# Patient Record
Sex: MALE | Race: ASIAN | NOT HISPANIC OR LATINO | URBAN - METROPOLITAN AREA
[De-identification: names, ages, dates, MRNs, and addresses within clinical notes are randomized per-mention and may not be internally consistent; named-entity substitution may affect disease eponyms.]

---

## 2017-01-31 ENCOUNTER — COMMUNICATION - HEALTHEAST (OUTPATIENT)
Dept: NURSING | Facility: CLINIC | Age: 47
End: 2017-01-31

## 2017-03-15 ENCOUNTER — COMMUNICATION - HEALTHEAST (OUTPATIENT)
Dept: NURSING | Facility: CLINIC | Age: 47
End: 2017-03-15

## 2017-06-26 ENCOUNTER — COMMUNICATION - HEALTHEAST (OUTPATIENT)
Dept: NURSING | Facility: CLINIC | Age: 47
End: 2017-06-26

## 2021-05-29 ENCOUNTER — RECORDS - HEALTHEAST (OUTPATIENT)
Dept: ADMINISTRATIVE | Facility: CLINIC | Age: 51
End: 2021-05-29

## 2021-05-31 ENCOUNTER — RECORDS - HEALTHEAST (OUTPATIENT)
Dept: ADMINISTRATIVE | Facility: CLINIC | Age: 51
End: 2021-05-31

## 2021-06-08 NOTE — PROGRESS NOTES
I have called the patient several times and have been unsuccessful in reaching him for 2 months now.  At this time, the patient has not returned any of my messages.  I will continue attempting to reach out to the patient in one month.  I will also check the patient's chart for upcoming appointments, ER reports that may contain a new phone number, or any other recent activity.  I have informed the primary care provider by message regarding the lack of successful follow up.    Pending Appointments:  None  _______________________  Planned Outreach Frequency: every 2 weeks  Preferred Phone Number: [phone]    Preferred : [facility]    Chronic Medical Diagnosis:  None    Mental Health:  Diagnosis:   Adjustment Disorder with Depressed Mood    Mental Health Provider:   Psychotherapist: JONNY Waite at [facility]  Psychiatry: Shannan Al CNP at [facility] (initial appointment 11/18/16 at 9:00)    Transportation:  Drivers license  Owns a car    Housing:  Living with his brotherKeon    Financial:  $12.00/hour, works 24.50 hours per pay period = $294/pay period  $588.00/month gross    Legal Immigration:  [place] Citizen     Substance/CD:  Smoking: Never Smoker   Smokeless Tobacco: Never Used   Alcohol: No     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  Future goal around obtaining a full time job    Interpersonal:  Legally , currently  and divorce process was begun 9/2/16  8 children (currently at the house with their mom)    Social Support:  BrotherKeon  Father, Weston Davey    Household Members:  Self  Keon Davey (brother)    Rest/Sleep:  Goes to bed about 11:00 PM  Wakes about 5:00 AM  Taking Remeron at bedtime to help with sleep, wakes up dizzy    Nutrition:  Decreased appetite due to divorce/stress  Drinking water    Exercise:  Unknown    Hygiene:  Independent    Medications:  Medication Management:  independent    Preventative Measures:  Medical Insurance: Medicaid  Annual Physical Exam: DUE  Flu Shot: 10/6/16  Advanced Care Directive: Due (discussed the importance of this, especially due to his current circumstances)    Advent/Spiritual:  Unknown    Safety:  No concerns    Family Planning:  Not applicable    Knowledge of Resources:  Patient is unsure of what is available to him    Barriers:  Language: Non-English speaking  Employment: minimal work at this time  Familial: wife filed an Order for Protection, Served Divorce papers 9/2/16

## 2021-06-09 NOTE — PROGRESS NOTES
I have called the patient several times over the past three months and have been unsuccessful in reaching him.  The patient has not returned any of my messages.  I am mailing the patient a letter stating the lack of success in reaching him/her.  I will continue attempting to reach out to the patient in 3 months.  I will also check the patient's chart for upcoming appointments, ER reports that may contain a new phone number, or any other recent activity.  I have informed the primary care provider by message regarding the lack of successful follow up.    Pending Appointments:  None  _______________________  Planned Outreach Frequency: every 2 weeks  Preferred Phone Number: [phone]    [facility] : [facility]    Chronic Medical Diagnosis:  None    Mental Health:  Diagnosis:   Adjustment Disorder with Depressed Mood    Mental Health Provider:   Psychotherapist: JONNY Waite at [facility]  Psychiatry: Shannan Al CNP at [facility] (initial appointment 11/18/16 at 9:00)    Transportation:  Drivers license  Owns a car    Housing:  Living with his brotherKeon    Financial:  $12.00/hour, works 24.50 hours per pay period = $294/pay period  $588.00/month gross    Legal Immigration:  [place] Citizen     Substance/CD:  Smoking: Never Smoker   Smokeless Tobacco: Never Used   Alcohol: No     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  Future goal around obtaining a full time job    Interpersonal:  Legally , currently  and divorce process was begun 9/2/16  8 children (currently at the house with their mom)    Social Support:  BrotherKeon  Father, Weston Davey    Household Members:  Self  Keon Davey (brother)    Rest/Sleep:  Goes to bed about 11:00 PM  Wakes about 5:00 AM  Taking Remeron at bedtime to help with sleep, wakes up dizzy    Nutrition:  Decreased appetite due to divorce/stress  Drinking  water    Exercise:  Unknown    Hygiene:  Independent    Medications:  Medication Management: independent    Preventative Measures:  Medical Insurance: Medicaid  Annual Physical Exam: DUE  Flu Shot: 10/6/16  Advanced Care Directive: Due (discussed the importance of this, especially due to his current circumstances)    Holiness/Spiritual:  Unknown    Safety:  No concerns    Family Planning:  Not applicable    Knowledge of Resources:  Patient is unsure of what is available to him    Barriers:  Language: Non-English speaking  Employment: minimal work at this time  Familial: wife filed an Order for Protection, Served Divorce papers 9/2/16

## 2021-06-11 NOTE — PROGRESS NOTES
Care Guide attempted to contact the patient.  If the patient is returning my call, please transfer the patient to me, Laura Vasquez, at [phone].    I have called the patient and have been unsuccessful in reaching him since I last spoke to him on 10/6/16.  The patient has not returned any of my messages.  I have now sent the patient a letter and un-enrolled him from [facility].  The patient can be referred again if there is a need for care coordination in the future.     Pending Appointments:  None  _______________________  Planned Outreach Frequency: every 2 weeks  Preferred Phone Number: [phone]    Preferred : [facility]    Chronic Medical Diagnosis:  None    Mental Health:  Diagnosis:   Adjustment Disorder with Depressed Mood    Mental Health Provider:   Psychotherapist: JONNY Waite at [facility]  Psychiatry: Shannan Al CNP at [facility] (initial appointment 11/18/16 at 9:00)    Transportation:  Drivers license  Owns a car    Housing:  Living with his brotherKeon    Financial:  $12.00/hour, works 24.50 hours per pay period = $294/pay period  $588.00/month gross    Legal Immigration:  [place] Citizen     Substance/CD:  Smoking: Never Smoker   Smokeless Tobacco: Never Used   Alcohol: No     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  Future goal around obtaining a full time job    Interpersonal:  Legally , currently  and divorce process was begun 9/2/16  8 children (currently at the house with their mom)    Social Support:  BrotherKeon  Father, Weston Davey    Household Members:  Self  Keon Davey (brother)    Rest/Sleep:  Goes to bed about 11:00 PM  Wakes about 5:00 AM  Taking Remeron at bedtime to help with sleep, wakes up dizzy    Nutrition:  Decreased appetite due to divorce/stress  Drinking water    Exercise:  Unknown    Hygiene:  Independent    Medications:  Medication  Management: independent    Preventative Measures:  Medical Insurance: Medicaid  Annual Physical Exam: DUE  Flu Shot: 10/6/16  Advanced Care Directive: Due (discussed the importance of this, especially due to his current circumstances)    Church/Spiritual:  Unknown    Safety:  No concerns    Family Planning:  Not applicable    Knowledge of Resources:  Patient is unsure of what is available to him    Barriers:  Language: Non-English speaking  Employment: minimal work at this time  Familial: wife filed an Order for Protection, Served Divorce papers 9/2/16

## 2025-03-10 ENCOUNTER — APPOINTMENT (OUTPATIENT)
Dept: CT IMAGING | Facility: HOSPITAL | Age: 55
DRG: 417 | End: 2025-03-10
Attending: STUDENT IN AN ORGANIZED HEALTH CARE EDUCATION/TRAINING PROGRAM

## 2025-03-10 ENCOUNTER — VIRTUAL VISIT (OUTPATIENT)
Dept: INTERPRETER SERVICES | Facility: CLINIC | Age: 55
End: 2025-03-10

## 2025-03-10 ENCOUNTER — APPOINTMENT (OUTPATIENT)
Dept: ULTRASOUND IMAGING | Facility: HOSPITAL | Age: 55
DRG: 417 | End: 2025-03-10
Attending: STUDENT IN AN ORGANIZED HEALTH CARE EDUCATION/TRAINING PROGRAM

## 2025-03-10 ENCOUNTER — APPOINTMENT (OUTPATIENT)
Dept: MRI IMAGING | Facility: HOSPITAL | Age: 55
DRG: 417 | End: 2025-03-10
Attending: STUDENT IN AN ORGANIZED HEALTH CARE EDUCATION/TRAINING PROGRAM

## 2025-03-10 ENCOUNTER — HOSPITAL ENCOUNTER (INPATIENT)
Facility: HOSPITAL | Age: 55
DRG: 417 | End: 2025-03-10
Attending: STUDENT IN AN ORGANIZED HEALTH CARE EDUCATION/TRAINING PROGRAM | Admitting: STUDENT IN AN ORGANIZED HEALTH CARE EDUCATION/TRAINING PROGRAM

## 2025-03-10 DIAGNOSIS — K81.0 ACUTE CHOLECYSTITIS: ICD-10-CM

## 2025-03-10 DIAGNOSIS — R79.89 ELEVATED LFTS: ICD-10-CM

## 2025-03-10 DIAGNOSIS — R07.9 CHEST PAIN, UNSPECIFIED TYPE: ICD-10-CM

## 2025-03-10 DIAGNOSIS — I10 BENIGN ESSENTIAL HYPERTENSION: Primary | ICD-10-CM

## 2025-03-10 DIAGNOSIS — N17.9 AKI (ACUTE KIDNEY INJURY): ICD-10-CM

## 2025-03-10 DIAGNOSIS — R10.84 GENERALIZED ABDOMINAL PAIN: ICD-10-CM

## 2025-03-10 LAB
ALBUMIN SERPL BCG-MCNC: 4 G/DL (ref 3.5–5.2)
ALP SERPL-CCNC: 250 U/L (ref 40–150)
ALT SERPL W P-5'-P-CCNC: 445 U/L (ref 0–70)
ANION GAP SERPL CALCULATED.3IONS-SCNC: 10 MMOL/L (ref 7–15)
AST SERPL W P-5'-P-CCNC: 200 U/L (ref 0–45)
BASOPHILS # BLD AUTO: 0 10E3/UL (ref 0–0.2)
BASOPHILS NFR BLD AUTO: 0 %
BILIRUB SERPL-MCNC: 4.1 MG/DL
BUN SERPL-MCNC: 13.4 MG/DL (ref 6–20)
CALCIUM SERPL-MCNC: 9.3 MG/DL (ref 8.8–10.4)
CHLORIDE SERPL-SCNC: 106 MMOL/L (ref 98–107)
CREAT SERPL-MCNC: 1.08 MG/DL (ref 0.67–1.17)
EGFRCR SERPLBLD CKD-EPI 2021: 81 ML/MIN/1.73M2
EOSINOPHIL # BLD AUTO: 0 10E3/UL (ref 0–0.7)
EOSINOPHIL NFR BLD AUTO: 0 %
ERYTHROCYTE [DISTWIDTH] IN BLOOD BY AUTOMATED COUNT: 13.3 % (ref 10–15)
EST. AVERAGE GLUCOSE BLD GHB EST-MCNC: 100 MG/DL
GLUCOSE BLDC GLUCOMTR-MCNC: 138 MG/DL (ref 70–99)
GLUCOSE SERPL-MCNC: 107 MG/DL (ref 70–99)
HBA1C MFR BLD: 5.1 %
HCO3 SERPL-SCNC: 23 MMOL/L (ref 22–29)
HCT VFR BLD AUTO: 50 % (ref 40–53)
HGB BLD-MCNC: 15.7 G/DL (ref 13.3–17.7)
IMM GRANULOCYTES # BLD: 0.1 10E3/UL
IMM GRANULOCYTES NFR BLD: 0 %
LIPASE SERPL-CCNC: 1660 U/L (ref 13–60)
LYMPHOCYTES # BLD AUTO: 1 10E3/UL (ref 0.8–5.3)
LYMPHOCYTES NFR BLD AUTO: 8 %
MAGNESIUM SERPL-MCNC: 2.2 MG/DL (ref 1.7–2.3)
MCH RBC QN AUTO: 27.9 PG (ref 26.5–33)
MCHC RBC AUTO-ENTMCNC: 31.4 G/DL (ref 31.5–36.5)
MCV RBC AUTO: 89 FL (ref 78–100)
MONOCYTES # BLD AUTO: 0.6 10E3/UL (ref 0–1.3)
MONOCYTES NFR BLD AUTO: 4 %
NEUTROPHILS # BLD AUTO: 11.3 10E3/UL (ref 1.6–8.3)
NEUTROPHILS NFR BLD AUTO: 88 %
NRBC # BLD AUTO: 0 10E3/UL
NRBC BLD AUTO-RTO: 0 /100
PHOSPHATE SERPL-MCNC: 2.5 MG/DL (ref 2.5–4.5)
PLATELET # BLD AUTO: 219 10E3/UL (ref 150–450)
POTASSIUM SERPL-SCNC: 4 MMOL/L (ref 3.4–5.3)
PROT SERPL-MCNC: 7 G/DL (ref 6.4–8.3)
RBC # BLD AUTO: 5.62 10E6/UL (ref 4.4–5.9)
SODIUM SERPL-SCNC: 139 MMOL/L (ref 135–145)
TROPONIN T SERPL HS-MCNC: 14 NG/L
TROPONIN T SERPL HS-MCNC: 16 NG/L
WBC # BLD AUTO: 12.9 10E3/UL (ref 4–11)

## 2025-03-10 PROCEDURE — 83690 ASSAY OF LIPASE: CPT | Performed by: STUDENT IN AN ORGANIZED HEALTH CARE EDUCATION/TRAINING PROGRAM

## 2025-03-10 PROCEDURE — 255N000002 HC RX 255 OP 636: Performed by: STUDENT IN AN ORGANIZED HEALTH CARE EDUCATION/TRAINING PROGRAM

## 2025-03-10 PROCEDURE — 74183 MRI ABD W/O CNTR FLWD CNTR: CPT

## 2025-03-10 PROCEDURE — 99223 1ST HOSP IP/OBS HIGH 75: CPT | Performed by: STUDENT IN AN ORGANIZED HEALTH CARE EDUCATION/TRAINING PROGRAM

## 2025-03-10 PROCEDURE — 36415 COLL VENOUS BLD VENIPUNCTURE: CPT | Performed by: STUDENT IN AN ORGANIZED HEALTH CARE EDUCATION/TRAINING PROGRAM

## 2025-03-10 PROCEDURE — 83735 ASSAY OF MAGNESIUM: CPT | Performed by: STUDENT IN AN ORGANIZED HEALTH CARE EDUCATION/TRAINING PROGRAM

## 2025-03-10 PROCEDURE — 250N000011 HC RX IP 250 OP 636: Performed by: STUDENT IN AN ORGANIZED HEALTH CARE EDUCATION/TRAINING PROGRAM

## 2025-03-10 PROCEDURE — 93005 ELECTROCARDIOGRAM TRACING: CPT | Performed by: STUDENT IN AN ORGANIZED HEALTH CARE EDUCATION/TRAINING PROGRAM

## 2025-03-10 PROCEDURE — 82947 ASSAY GLUCOSE BLOOD QUANT: CPT | Performed by: STUDENT IN AN ORGANIZED HEALTH CARE EDUCATION/TRAINING PROGRAM

## 2025-03-10 PROCEDURE — 82310 ASSAY OF CALCIUM: CPT | Performed by: STUDENT IN AN ORGANIZED HEALTH CARE EDUCATION/TRAINING PROGRAM

## 2025-03-10 PROCEDURE — 71275 CT ANGIOGRAPHY CHEST: CPT

## 2025-03-10 PROCEDURE — 84484 ASSAY OF TROPONIN QUANT: CPT | Performed by: STUDENT IN AN ORGANIZED HEALTH CARE EDUCATION/TRAINING PROGRAM

## 2025-03-10 PROCEDURE — 120N000001 HC R&B MED SURG/OB

## 2025-03-10 PROCEDURE — 85004 AUTOMATED DIFF WBC COUNT: CPT | Performed by: STUDENT IN AN ORGANIZED HEALTH CARE EDUCATION/TRAINING PROGRAM

## 2025-03-10 PROCEDURE — 250N000013 HC RX MED GY IP 250 OP 250 PS 637: Performed by: STUDENT IN AN ORGANIZED HEALTH CARE EDUCATION/TRAINING PROGRAM

## 2025-03-10 PROCEDURE — 99285 EMERGENCY DEPT VISIT HI MDM: CPT | Mod: 25

## 2025-03-10 PROCEDURE — 84100 ASSAY OF PHOSPHORUS: CPT | Performed by: STUDENT IN AN ORGANIZED HEALTH CARE EDUCATION/TRAINING PROGRAM

## 2025-03-10 PROCEDURE — 76705 ECHO EXAM OF ABDOMEN: CPT

## 2025-03-10 PROCEDURE — A9585 GADOBUTROL INJECTION: HCPCS | Performed by: STUDENT IN AN ORGANIZED HEALTH CARE EDUCATION/TRAINING PROGRAM

## 2025-03-10 PROCEDURE — 82962 GLUCOSE BLOOD TEST: CPT

## 2025-03-10 PROCEDURE — 84075 ASSAY ALKALINE PHOSPHATASE: CPT | Performed by: STUDENT IN AN ORGANIZED HEALTH CARE EDUCATION/TRAINING PROGRAM

## 2025-03-10 PROCEDURE — 85048 AUTOMATED LEUKOCYTE COUNT: CPT | Performed by: STUDENT IN AN ORGANIZED HEALTH CARE EDUCATION/TRAINING PROGRAM

## 2025-03-10 PROCEDURE — 96374 THER/PROPH/DIAG INJ IV PUSH: CPT | Mod: 59

## 2025-03-10 PROCEDURE — 83036 HEMOGLOBIN GLYCOSYLATED A1C: CPT | Performed by: STUDENT IN AN ORGANIZED HEALTH CARE EDUCATION/TRAINING PROGRAM

## 2025-03-10 PROCEDURE — 250N000011 HC RX IP 250 OP 636: Mod: JZ | Performed by: STUDENT IN AN ORGANIZED HEALTH CARE EDUCATION/TRAINING PROGRAM

## 2025-03-10 PROCEDURE — 96375 TX/PRO/DX INJ NEW DRUG ADDON: CPT

## 2025-03-10 PROCEDURE — 258N000003 HC RX IP 258 OP 636: Performed by: STUDENT IN AN ORGANIZED HEALTH CARE EDUCATION/TRAINING PROGRAM

## 2025-03-10 RX ORDER — PIPERACILLIN SODIUM, TAZOBACTAM SODIUM 3; .375 G/15ML; G/15ML
3.38 INJECTION, POWDER, LYOPHILIZED, FOR SOLUTION INTRAVENOUS EVERY 8 HOURS
Status: DISCONTINUED | OUTPATIENT
Start: 2025-03-11 | End: 2025-03-17

## 2025-03-10 RX ORDER — CALCIUM CARBONATE 500 MG/1
1000 TABLET, CHEWABLE ORAL 4 TIMES DAILY PRN
Status: DISCONTINUED | OUTPATIENT
Start: 2025-03-10 | End: 2025-03-17 | Stop reason: HOSPADM

## 2025-03-10 RX ORDER — HYDROMORPHONE HYDROCHLORIDE 1 MG/ML
0.5 INJECTION, SOLUTION INTRAMUSCULAR; INTRAVENOUS; SUBCUTANEOUS ONCE
Status: COMPLETED | OUTPATIENT
Start: 2025-03-10 | End: 2025-03-10

## 2025-03-10 RX ORDER — PIPERACILLIN SODIUM, TAZOBACTAM SODIUM 3; .375 G/15ML; G/15ML
3.38 INJECTION, POWDER, LYOPHILIZED, FOR SOLUTION INTRAVENOUS ONCE
Status: COMPLETED | OUTPATIENT
Start: 2025-03-10 | End: 2025-03-10

## 2025-03-10 RX ORDER — AMOXICILLIN 250 MG
1 CAPSULE ORAL 2 TIMES DAILY PRN
Status: DISCONTINUED | OUTPATIENT
Start: 2025-03-10 | End: 2025-03-17 | Stop reason: HOSPADM

## 2025-03-10 RX ORDER — IOPAMIDOL 755 MG/ML
90 INJECTION, SOLUTION INTRAVASCULAR ONCE
Status: COMPLETED | OUTPATIENT
Start: 2025-03-10 | End: 2025-03-10

## 2025-03-10 RX ORDER — GADOBUTROL 604.72 MG/ML
7 INJECTION INTRAVENOUS ONCE
Status: COMPLETED | OUTPATIENT
Start: 2025-03-10 | End: 2025-03-10

## 2025-03-10 RX ORDER — HYDRALAZINE HYDROCHLORIDE 20 MG/ML
10 INJECTION INTRAMUSCULAR; INTRAVENOUS EVERY 4 HOURS PRN
Status: DISCONTINUED | OUTPATIENT
Start: 2025-03-10 | End: 2025-03-11

## 2025-03-10 RX ORDER — MAGNESIUM HYDROXIDE/ALUMINUM HYDROXICE/SIMETHICONE 120; 1200; 1200 MG/30ML; MG/30ML; MG/30ML
30 SUSPENSION ORAL EVERY 4 HOURS PRN
Status: DISCONTINUED | OUTPATIENT
Start: 2025-03-10 | End: 2025-03-17 | Stop reason: HOSPADM

## 2025-03-10 RX ORDER — OXYCODONE HYDROCHLORIDE 5 MG/1
5 TABLET ORAL ONCE
Status: COMPLETED | OUTPATIENT
Start: 2025-03-10 | End: 2025-03-10

## 2025-03-10 RX ORDER — HYDRALAZINE HYDROCHLORIDE 10 MG/1
10 TABLET, FILM COATED ORAL EVERY 4 HOURS PRN
Status: DISCONTINUED | OUTPATIENT
Start: 2025-03-10 | End: 2025-03-11

## 2025-03-10 RX ORDER — OXYCODONE HYDROCHLORIDE 5 MG/1
5 TABLET ORAL EVERY 4 HOURS PRN
Status: DISCONTINUED | OUTPATIENT
Start: 2025-03-10 | End: 2025-03-17 | Stop reason: HOSPADM

## 2025-03-10 RX ORDER — HYDROMORPHONE HCL IN WATER/PF 6 MG/30 ML
0.2 PATIENT CONTROLLED ANALGESIA SYRINGE INTRAVENOUS
Status: DISCONTINUED | OUTPATIENT
Start: 2025-03-10 | End: 2025-03-11

## 2025-03-10 RX ORDER — SODIUM CHLORIDE 9 MG/ML
INJECTION, SOLUTION INTRAVENOUS CONTINUOUS
Status: DISCONTINUED | OUTPATIENT
Start: 2025-03-10 | End: 2025-03-11

## 2025-03-10 RX ORDER — ONDANSETRON 2 MG/ML
4 INJECTION INTRAMUSCULAR; INTRAVENOUS EVERY 6 HOURS PRN
Status: DISCONTINUED | OUTPATIENT
Start: 2025-03-10 | End: 2025-03-17 | Stop reason: HOSPADM

## 2025-03-10 RX ORDER — AMOXICILLIN 250 MG
2 CAPSULE ORAL 2 TIMES DAILY PRN
Status: DISCONTINUED | OUTPATIENT
Start: 2025-03-10 | End: 2025-03-17 | Stop reason: HOSPADM

## 2025-03-10 RX ORDER — LIDOCAINE 40 MG/G
CREAM TOPICAL
Status: DISCONTINUED | OUTPATIENT
Start: 2025-03-10 | End: 2025-03-17 | Stop reason: HOSPADM

## 2025-03-10 RX ORDER — HYDROMORPHONE HCL IN WATER/PF 6 MG/30 ML
0.4 PATIENT CONTROLLED ANALGESIA SYRINGE INTRAVENOUS
Status: DISCONTINUED | OUTPATIENT
Start: 2025-03-10 | End: 2025-03-11

## 2025-03-10 RX ORDER — ONDANSETRON 4 MG/1
4 TABLET, ORALLY DISINTEGRATING ORAL EVERY 6 HOURS PRN
Status: DISCONTINUED | OUTPATIENT
Start: 2025-03-10 | End: 2025-03-17 | Stop reason: HOSPADM

## 2025-03-10 RX ADMIN — HYDROMORPHONE HYDROCHLORIDE 0.5 MG: 1 INJECTION, SOLUTION INTRAMUSCULAR; INTRAVENOUS; SUBCUTANEOUS at 16:37

## 2025-03-10 RX ADMIN — GADOBUTROL 7 ML: 604.72 INJECTION INTRAVENOUS at 20:13

## 2025-03-10 RX ADMIN — HYDROMORPHONE HYDROCHLORIDE 0.4 MG: 0.2 INJECTION, SOLUTION INTRAMUSCULAR; INTRAVENOUS; SUBCUTANEOUS at 23:52

## 2025-03-10 RX ADMIN — IOPAMIDOL 90 ML: 755 INJECTION, SOLUTION INTRAVENOUS at 17:14

## 2025-03-10 RX ADMIN — HYDRALAZINE HYDROCHLORIDE 10 MG: 20 INJECTION INTRAMUSCULAR; INTRAVENOUS at 21:16

## 2025-03-10 RX ADMIN — HYDROMORPHONE HYDROCHLORIDE 0.5 MG: 1 INJECTION, SOLUTION INTRAMUSCULAR; INTRAVENOUS; SUBCUTANEOUS at 18:33

## 2025-03-10 RX ADMIN — OXYCODONE HYDROCHLORIDE 5 MG: 5 TABLET ORAL at 16:10

## 2025-03-10 RX ADMIN — SODIUM CHLORIDE: 0.9 INJECTION, SOLUTION INTRAVENOUS at 21:21

## 2025-03-10 RX ADMIN — HYDROMORPHONE HYDROCHLORIDE 0.4 MG: 0.2 INJECTION, SOLUTION INTRAMUSCULAR; INTRAVENOUS; SUBCUTANEOUS at 21:17

## 2025-03-10 RX ADMIN — PIPERACILLIN AND TAZOBACTAM 3.38 G: 3; .375 INJECTION, POWDER, FOR SOLUTION INTRAVENOUS at 18:35

## 2025-03-10 RX ADMIN — FAMOTIDINE 20 MG: 10 INJECTION, SOLUTION INTRAVENOUS at 16:10

## 2025-03-10 RX ADMIN — ONDANSETRON 4 MG: 2 INJECTION, SOLUTION INTRAMUSCULAR; INTRAVENOUS at 23:52

## 2025-03-10 ASSESSMENT — ACTIVITIES OF DAILY LIVING (ADL)
ADLS_ACUITY_SCORE: 41
ADLS_ACUITY_SCORE: 48
ADLS_ACUITY_SCORE: 41

## 2025-03-10 ASSESSMENT — COLUMBIA-SUICIDE SEVERITY RATING SCALE - C-SSRS
2. HAVE YOU ACTUALLY HAD ANY THOUGHTS OF KILLING YOURSELF IN THE PAST MONTH?: NO
6. HAVE YOU EVER DONE ANYTHING, STARTED TO DO ANYTHING, OR PREPARED TO DO ANYTHING TO END YOUR LIFE?: NO
1. IN THE PAST MONTH, HAVE YOU WISHED YOU WERE DEAD OR WISHED YOU COULD GO TO SLEEP AND NOT WAKE UP?: NO

## 2025-03-10 NOTE — ED PROVIDER NOTES
EMERGENCY DEPARTMENT SIGN OUT NOTE        ED COURSE AND MEDICAL DECISION MAKING  Patient was signed out to me by Dr Yessica Ni at 6:15 PM  6:30 PM Spoke with Dr. Gondal, hospitalist, who accepts patient for admission.  GI recommended right upper quadrant ultrasound, admission and will plan for multi team approach with surgery.  7:29 PM Spoke with Dr. Estes, surgery. NPO at midnight. Continue antibiotics. Plan for muti-team approach with GI tomorrow.     In brief, Blue Davey is a 55 year old male who initially presented with abdominal pain radiating to his left chest.      At time of sign out, pending discussion with hospitalist and surgery.     FINAL IMPRESSION    1. Generalized abdominal pain    2. Chest pain, unspecified type    3. Acute cholecystitis    4. Elevated LFTs        ED MEDS  Medications   lidocaine 1 % 0.1-1 mL (has no administration in time range)   lidocaine (LMX4) cream (has no administration in time range)   sodium chloride (PF) 0.9% PF flush 3 mL (3 mLs Intracatheter $Given 3/10/25 6452)   sodium chloride (PF) 0.9% PF flush 3 mL (has no administration in time range)   senna-docusate (SENOKOT-S/PERICOLACE) 8.6-50 MG per tablet 1 tablet (has no administration in time range)     Or   senna-docusate (SENOKOT-S/PERICOLACE) 8.6-50 MG per tablet 2 tablet (has no administration in time range)   calcium carbonate (TUMS) chewable tablet 1,000 mg (has no administration in time range)   sodium chloride 0.9 % infusion (has no administration in time range)   hydrALAZINE (APRESOLINE) tablet 10 mg (has no administration in time range)     Or   hydrALAZINE (APRESOLINE) injection 10 mg (has no administration in time range)   oxyCODONE IR (ROXICODONE) half-tab 2.5 mg (has no administration in time range)   oxyCODONE (ROXICODONE) tablet 5 mg (has no administration in time range)   HYDROmorphone (DILAUDID) injection 0.2 mg (has no administration in time range)   HYDROmorphone (DILAUDID) injection 0.4 mg (has no  administration in time range)   ondansetron (ZOFRAN ODT) ODT tab 4 mg (has no administration in time range)     Or   ondansetron (ZOFRAN) injection 4 mg (has no administration in time range)   famotidine (PEPCID) injection 20 mg (has no administration in time range)   pantoprazole (PROTONIX) IV push injection 40 mg (has no administration in time range)   alum & mag hydroxide-simethicone (MAALOX) suspension 30 mL (has no administration in time range)   piperacillin-tazobactam (ZOSYN) 3.375 g vial to attach to  mL bag (has no administration in time range)   oxyCODONE (ROXICODONE) tablet 5 mg (5 mg Oral $Given 3/10/25 1610)   famotidine (PEPCID) injection 20 mg (20 mg Intravenous $Given 3/10/25 1610)   HYDROmorphone (PF) (DILAUDID) injection 0.5 mg (0.5 mg Intravenous $Given 3/10/25 1637)   iopamidol (ISOVUE-370) solution 90 mL (90 mLs Intravenous $Given 3/10/25 1714)   piperacillin-tazobactam (ZOSYN) 3.375 g vial to attach to  mL bag (3.375 g Intravenous $New Bag 3/10/25 1835)   HYDROmorphone (PF) (DILAUDID) injection 0.5 mg (0.5 mg Intravenous $Given 3/10/25 1833)       LAB  Labs Ordered and Resulted from Time of ED Arrival to Time of ED Departure   COMPREHENSIVE METABOLIC PANEL - Abnormal       Result Value    Sodium 139      Potassium 4.0      Carbon Dioxide (CO2) 23      Anion Gap 10      Urea Nitrogen 13.4      Creatinine 1.08      GFR Estimate 81      Calcium 9.3      Chloride 106      Glucose 107 (*)     Alkaline Phosphatase 250 (*)      (*)      (*)     Protein Total 7.0      Albumin 4.0      Bilirubin Total 4.1 (*)    LIPASE - Abnormal    Lipase 1,660 (*)    CBC WITH PLATELETS AND DIFFERENTIAL - Abnormal    WBC Count 12.9 (*)     RBC Count 5.62      Hemoglobin 15.7      Hematocrit 50.0      MCV 89      MCH 27.9      MCHC 31.4 (*)     RDW 13.3      Platelet Count 219      % Neutrophils 88      % Lymphocytes 8      % Monocytes 4      % Eosinophils 0      % Basophils 0      % Immature  Granulocytes 0      NRBCs per 100 WBC 0      Absolute Neutrophils 11.3 (*)     Absolute Lymphocytes 1.0      Absolute Monocytes 0.6      Absolute Eosinophils 0.0      Absolute Basophils 0.0      Absolute Immature Granulocytes 0.1      Absolute NRBCs 0.0     TROPONIN T, HIGH SENSITIVITY - Normal    Troponin T, High Sensitivity 16     TROPONIN T, HIGH SENSITIVITY - Normal    Troponin T, High Sensitivity 14     MAGNESIUM - Normal    Magnesium 2.2     PHOSPHORUS - Normal    Phosphorus 2.5     HEMOGLOBIN A1C - Normal    Estimated Average Glucose 100      Hemoglobin A1C 5.1     GLUCOSE MONITOR NURSING POCT         RADIOLOGY    CTA Chest Abdomen Pelvis w Contrast   Final Result   IMPRESSION:   1.  Findings consistent with acute pancreatitis with no necrotizing features or pseudocyst formation.      2.  Moderate to advanced fatty infiltration of the liver.      3.  Mild pericholecystic fluid with gallbladder wall thickening measuring up to 4.6 mm. These findings can be seen with acute cholecystitis and ultrasound may be of benefit for further evaluation.      4.  Calcifications seen in the pancreatic head with limited detail given motion artifact. I do not see any significant bile duct dilatation to suggest this calcification is in the distal common bile duct.      5.  No acute vascular abnormalities.      6.  Appendicolith with the appendix otherwise normal in appearance.         US Abdomen Limited    (Results Pending)   MR Abdomen MRCP w/o & w Contrast    (Results Pending)   Echocardiogram Complete    (Results Pending)       DISCHARGE MEDS  New Prescriptions    No medications on file         Haritha Osorio MD  Emergency Medicine  [facility] EMERGENCY DEPARTMENT  [address] [place] [id number]  [phone]     Haritha Osorio MD  03/10/25 1957

## 2025-03-10 NOTE — ED PROVIDER NOTES
NAME: Blue Davey  AGE: 55 year old male  YOB: 1970  MRN: [id number]  EVALUATION DATE & TIME: No admission date for patient encounter.    PCP: Juan Daniel Ortiz  ED PROVIDER: Yessica Ni MD.    Chief Complaint   Patient presents with    Chest Pain    Abdominal Pain     FINAL IMPRESSION:  1. Generalized abdominal pain    2. Chest pain, unspecified type    3. Acute cholecystitis    4. Elevated LFTs      MEDICAL DECISION MAKIN:40 PM I independently reviewed and interpreted the patient's ECG with some concerning findings.   2:41 PM I met with the patient, obtained history, performed an initial exam, and discussed options and plan for diagnostics and treatment here in the ED.   2:49 PM Paged cardiology.   2:57 PM I spoke with cardiologist Dr. Vila and we discussed the patient's case.   6:03 PM Updated patient  6:13 PM Discussed with [facility] Dr. Fernando. Would recommended RUQ US to further look at gallbladder and to look for stones. May need combined approach with GI/general surgery     MDM: 55-year-old male with no reported past medical history who presents with chest and abdominal pain.  Has had pain since yesterday morning.  Pain is located in his abdomen and spreads to the left side of his chest.  It is worse with deep breaths.  He did become quite hypertensive here, suspect pain contributing.    DX: ACS, PE, pericarditis, dissection, pancreatitis, hepatobiliary pathology, appendicitis, obstruction, AAA, kidney stone, ulcer, among others.    Initial EKG had some concerning ST changes with no prior to compare to.  Discussed with cardiology who reviewed EKG, not a STEMI, okay to proceed with workup.  Repeat EKG without concerning changes.  Initial troponin 16.  Will trend.    Labs show WBC 12.9, , , alk phos 250, t bili 4.1.    CTA chest/abd/pelvis shows pancreatitis as well as mild pericholecystic fluid with gallbladder wall thickening, which can be seen with acute cholecystitis.  They  did not see any significant bile duct dilation.    Zosyn started. Discussed with GI. Will get RUQ US and also paged general surgery. Signed out to oncoming ED physician pending US, general surgery consult and admission to hospitalist.     Medical Decision Making  Obtained supplemental history:Supplemental history obtained?: No  Reviewed external records: External records reviewed?: Documented in chart  Care impacted by chronic illness:Documented in Chart  Did you consider but not order tests?: Work up considered but not performed and documented in chart, if applicable  Did you interpret images independently?: Independent interpretation of ECG and images noted in documentation, when applicable.  Consultation discussion with other provider:Did you involve another provider (consultant, , pharmacy, etc.)?: I discussed the care with another health care provider, see documentation for details.  Admit.    MIPS: Not Applicable      MEDICATIONS GIVEN IN THE EMERGENCY:  Medications   lidocaine 1 % 0.1-1 mL (has no administration in time range)   lidocaine (LMX4) cream (has no administration in time range)   sodium chloride (PF) 0.9% PF flush 3 mL (3 mLs Intracatheter $Given 3/10/25 2669)   sodium chloride (PF) 0.9% PF flush 3 mL (has no administration in time range)   senna-docusate (SENOKOT-S/PERICOLACE) 8.6-50 MG per tablet 1 tablet (has no administration in time range)     Or   senna-docusate (SENOKOT-S/PERICOLACE) 8.6-50 MG per tablet 2 tablet (has no administration in time range)   calcium carbonate (TUMS) chewable tablet 1,000 mg (has no administration in time range)   sodium chloride 0.9 % infusion (has no administration in time range)   hydrALAZINE (APRESOLINE) tablet 10 mg (has no administration in time range)     Or   hydrALAZINE (APRESOLINE) injection 10 mg (has no administration in time range)   oxyCODONE IR (ROXICODONE) half-tab 2.5 mg (has no administration in time range)   oxyCODONE (ROXICODONE) tablet 5 mg  (has no administration in time range)   HYDROmorphone (DILAUDID) injection 0.2 mg (has no administration in time range)   HYDROmorphone (DILAUDID) injection 0.4 mg (has no administration in time range)   ondansetron (ZOFRAN ODT) ODT tab 4 mg (has no administration in time range)     Or   ondansetron (ZOFRAN) injection 4 mg (has no administration in time range)   famotidine (PEPCID) injection 20 mg (has no administration in time range)   pantoprazole (PROTONIX) IV push injection 40 mg (has no administration in time range)   alum & mag hydroxide-simethicone (MAALOX) suspension 30 mL (has no administration in time range)   piperacillin-tazobactam (ZOSYN) 3.375 g vial to attach to  mL bag (has no administration in time range)   oxyCODONE (ROXICODONE) tablet 5 mg (5 mg Oral $Given 3/10/25 1610)   famotidine (PEPCID) injection 20 mg (20 mg Intravenous $Given 3/10/25 1610)   HYDROmorphone (PF) (DILAUDID) injection 0.5 mg (0.5 mg Intravenous $Given 3/10/25 1637)   iopamidol (ISOVUE-370) solution 90 mL (90 mLs Intravenous $Given 3/10/25 1714)   piperacillin-tazobactam (ZOSYN) 3.375 g vial to attach to  mL bag (3.375 g Intravenous $New Bag 3/10/25 1835)   HYDROmorphone (PF) (DILAUDID) injection 0.5 mg (0.5 mg Intravenous $Given 3/10/25 1833)       NEW PRESCRIPTIONS STARTED AT TODAY'S ER VISIT:  New Prescriptions    No medications on file        =================================================================  HPI    Patient information was obtained from: The patient  Use of : Yes (Phone) - Language Carline Davey is a 55 year old male with no relevant past medical history, who presents due to chest and abdominal pains.    Patient reports onset left sided pleuritic chest pain yesterday morning with accompanying shortness of breath. Endorses the pain radiates to his abdomen diffusely, also reporting of abdominal distention and constipation since yesterday morning. Of note, he has some  "asymmetry/scarring to his chin from a surgery in [place]. Otherwise denies any past medical history. Denies vomiting, fevers, diarrhea, and black/bloody stools. Reports no alcohol use.    PHYSICAL EXAM:    Vitals: BP (!) 187/109   Pulse 101   Temp 99.2  F (37.3  C)   Resp 30   Ht 1.575 m (5' 2\")   Wt 65.6 kg (144 lb 11.2 oz)   SpO2 95%   BMI 26.47 kg/m     Constitutional: Well developed, well nourished. Appears uncomfortable  HENT: Normocephalic, atraumatic. Neck-gross ROM intact.   Eyes: Pupils mid-range, sclera white  Respiratory: CTAB, no respiratory distress  Cardiovascular: Normal heart rate, regular rhythm. No lower extremity edema. Equal radial pulses.  GI: Soft, not distended, diffuse tenderness   Musculoskeletal: Moving extremities intentionally and without pain. No obvious deformity.  Skin: Warm, dry, no rash seen  Neurologic: Alert & oriented, speech clear, chronic deformity to right lower face otherwise no focal deficits noted    LAB:  All pertinent labs reviewed and interpreted.  Labs Ordered and Resulted from Time of ED Arrival to Time of ED Departure   COMPREHENSIVE METABOLIC PANEL - Abnormal       Result Value    Sodium 139      Potassium 4.0      Carbon Dioxide (CO2) 23      Anion Gap 10      Urea Nitrogen 13.4      Creatinine 1.08      GFR Estimate 81      Calcium 9.3      Chloride 106      Glucose 107 (*)     Alkaline Phosphatase 250 (*)      (*)      (*)     Protein Total 7.0      Albumin 4.0      Bilirubin Total 4.1 (*)    LIPASE - Abnormal    Lipase 1,660 (*)    CBC WITH PLATELETS AND DIFFERENTIAL - Abnormal    WBC Count 12.9 (*)     RBC Count 5.62      Hemoglobin 15.7      Hematocrit 50.0      MCV 89      MCH 27.9      MCHC 31.4 (*)     RDW 13.3      Platelet Count 219      % Neutrophils 88      % Lymphocytes 8      % Monocytes 4      % Eosinophils 0      % Basophils 0      % Immature Granulocytes 0      NRBCs per 100 WBC 0      Absolute Neutrophils 11.3 (*)     Absolute " Lymphocytes 1.0      Absolute Monocytes 0.6      Absolute Eosinophils 0.0      Absolute Basophils 0.0      Absolute Immature Granulocytes 0.1      Absolute NRBCs 0.0     TROPONIN T, HIGH SENSITIVITY - Normal    Troponin T, High Sensitivity 16     TROPONIN T, HIGH SENSITIVITY - Normal    Troponin T, High Sensitivity 14     MAGNESIUM - Normal    Magnesium 2.2     PHOSPHORUS - Normal    Phosphorus 2.5     HEMOGLOBIN A1C - Normal    Estimated Average Glucose 100      Hemoglobin A1C 5.1     GLUCOSE MONITOR NURSING POCT       RADIOLOGY:  CTA Chest Abdomen Pelvis w Contrast   Final Result   IMPRESSION:   1.  Findings consistent with acute pancreatitis with no necrotizing features or pseudocyst formation.      2.  Moderate to advanced fatty infiltration of the liver.      3.  Mild pericholecystic fluid with gallbladder wall thickening measuring up to 4.6 mm. These findings can be seen with acute cholecystitis and ultrasound may be of benefit for further evaluation.      4.  Calcifications seen in the pancreatic head with limited detail given motion artifact. I do not see any significant bile duct dilatation to suggest this calcification is in the distal common bile duct.      5.  No acute vascular abnormalities.      6.  Appendicolith with the appendix otherwise normal in appearance.         US Abdomen Limited    (Results Pending)   MR Abdomen MRCP w/o & w Contrast    (Results Pending)   Echocardiogram Complete    (Results Pending)       EKG:   Performed at: 14:35:21  Rate: 87 bpm   Rhythm: Sinus  QRS Interval: 108 ms  QTcB Interval: 450 ms  Comparison: No previous ECG available  I have independently reviewed and interpreted the EKG(s) documented above.     Performed at: 10-Mar-2025 17:16:17  Impression: sinus tachycardia. Rightward axis. Minimal voltage criteria for LVH, may be normal variant.  Rate: 101 bpm   Rhythm: Sinus  QRS Interval: 112 ms  QTcB Interval: 456 ms  Comparison: When compared with ECG of 10-Mar-2025  14:53, no significant change was found.  I have independently reviewed and interpreted the EKG(s) documented above.     I, Marquise Mckeon, am serving as a scribe to document services personally performed by Dr. Yessica Ni based on my observation and the provider's statements to me. I, Yessica Ni MD attest that Marquise Mckeon is acting in a scribe capacity, has observed my performance of the services and has documented them in accordance with my direction.    Yessica Ni M.D.  Emergency Medicine  [facility] EMERGENCY DEPARTMENT  [address] [place] [id number]  [phone]  Dept: [phone]     Yessica Ni MD  03/10/25 1927

## 2025-03-10 NOTE — ED TRIAGE NOTES
Substernal chest heaviness since 0500 yesterday that radiates to left shoulder. No n/v, dizziness. Also c/o diffuse abdominal discomfort since this am.      Triage Assessment (Adult)       Row Name 03/10/25 1410          Triage Assessment    Airway WDL WDL        Respiratory WDL    Respiratory WDL WDL        Cardiac WDL    Cardiac WDL X;chest pain        Chest Pain Assessment    Chest Pain Location midsternal     Chest Pain Radiation shoulder     Character other (see comments)  heavy     Duration steady since yesterday at 0500        Peripheral/Neurovascular WDL    Peripheral Neurovascular WDL WDL        Cognitive/Neuro/Behavioral WDL    Cognitive/Neuro/Behavioral WDL WDL

## 2025-03-10 NOTE — LETTER
[place] [address]  [address] [facility] [address]  Phone: [phone]  Fax: [phone]    March 17, 2025        Blue Davey  [address] [place] [address]          To whom it may concern:    RE: Blue Davey    Patient was seen and treated 3/11/25 to 3/17/25 in our hospital and missed work due to this.    Please contact me for questions or concerns.      Sincerely,      [facility]  [facility] General Surgery  [address] [place] [id number]  [facility] [phone]

## 2025-03-10 NOTE — MEDICATION SCRIBE - ADMISSION MEDICATION HISTORY
Medication Scribe Admission Medication History    Admission medication history is complete. The information provided in this note is only as accurate as the sources available at the time of the update.    Information Source(s): Patient via in-person    Pertinent Information: patient reports self management of medications. Patient reports taking no medications.     Changes made to PTA medication list:  Added: None  Deleted: Remeron  Changed: None    Allergies reviewed with patient and updates made in EHR: yes    Medication History Completed By: Roberto Harley 3/10/2025 5:53 PM    No outpatient medications have been marked as taking for the 3/10/25 encounter (Hospital Encounter).

## 2025-03-10 NOTE — LETTER
[facility]  [address] [facility] [address]  Phone: [phone]  Fax: [phone]    March 17, 2025        Blue Davey  [address] [place] [address]          To whom it may concern:    RE: Blue Davey    Patient was seen and treated at our hospital 3/11/25 to 3/17/25. He may return to work on or after 3/24/25 with the following:  no lifting, pushing, or pulling greater than 20 pounds until after 3/26/25.    Please contact me for questions or concerns.      Sincerely,      [facility]  [facility] General Surgery  [address] [place] [id number]  [facility] [phone]

## 2025-03-11 ENCOUNTER — VIRTUAL VISIT (OUTPATIENT)
Dept: INTERPRETER SERVICES | Facility: CLINIC | Age: 55
End: 2025-03-11

## 2025-03-11 ENCOUNTER — ANESTHESIA EVENT (OUTPATIENT)
Dept: SURGERY | Facility: HOSPITAL | Age: 55
End: 2025-03-11

## 2025-03-11 ENCOUNTER — APPOINTMENT (OUTPATIENT)
Dept: RADIOLOGY | Facility: HOSPITAL | Age: 55
DRG: 417 | End: 2025-03-11
Attending: SPECIALIST

## 2025-03-11 ENCOUNTER — ANESTHESIA (OUTPATIENT)
Dept: SURGERY | Facility: HOSPITAL | Age: 55
End: 2025-03-11

## 2025-03-11 LAB
ALBUMIN SERPL BCG-MCNC: 3.5 G/DL (ref 3.5–5.2)
ALP SERPL-CCNC: 215 U/L (ref 40–150)
ALT SERPL W P-5'-P-CCNC: 328 U/L (ref 0–70)
ANION GAP SERPL CALCULATED.3IONS-SCNC: 6 MMOL/L (ref 7–15)
AST SERPL W P-5'-P-CCNC: 80 U/L (ref 0–45)
BILIRUB DIRECT SERPL-MCNC: 0.8 MG/DL (ref 0–0.3)
BILIRUB SERPL-MCNC: 1.7 MG/DL
BUN SERPL-MCNC: 15.9 MG/DL (ref 6–20)
CALCIUM SERPL-MCNC: 8.5 MG/DL (ref 8.8–10.4)
CHLORIDE SERPL-SCNC: 107 MMOL/L (ref 98–107)
CREAT SERPL-MCNC: 0.99 MG/DL (ref 0.67–1.17)
EGFRCR SERPLBLD CKD-EPI 2021: 90 ML/MIN/1.73M2
ERYTHROCYTE [DISTWIDTH] IN BLOOD BY AUTOMATED COUNT: 13.5 % (ref 10–15)
GLUCOSE BLDC GLUCOMTR-MCNC: 112 MG/DL (ref 70–99)
GLUCOSE BLDC GLUCOMTR-MCNC: 143 MG/DL (ref 70–99)
GLUCOSE BLDC GLUCOMTR-MCNC: 85 MG/DL (ref 70–99)
GLUCOSE SERPL-MCNC: 112 MG/DL (ref 70–99)
HCO3 SERPL-SCNC: 24 MMOL/L (ref 22–29)
HCT VFR BLD AUTO: 44.4 % (ref 40–53)
HGB BLD-MCNC: 14.3 G/DL (ref 13.3–17.7)
LIPASE SERPL-CCNC: 415 U/L (ref 13–60)
MAGNESIUM SERPL-MCNC: 2.1 MG/DL (ref 1.7–2.3)
MCH RBC QN AUTO: 28.4 PG (ref 26.5–33)
MCHC RBC AUTO-ENTMCNC: 32.2 G/DL (ref 31.5–36.5)
MCV RBC AUTO: 88 FL (ref 78–100)
PHOSPHATE SERPL-MCNC: 2.7 MG/DL (ref 2.5–4.5)
PLATELET # BLD AUTO: 201 10E3/UL (ref 150–450)
POTASSIUM SERPL-SCNC: 3.9 MMOL/L (ref 3.4–5.3)
PROT SERPL-MCNC: 6 G/DL (ref 6.4–8.3)
RBC # BLD AUTO: 5.03 10E6/UL (ref 4.4–5.9)
SODIUM SERPL-SCNC: 137 MMOL/L (ref 135–145)
TROPONIN T SERPL HS-MCNC: 14 NG/L
TROPONIN T SERPL HS-MCNC: 17 NG/L
WBC # BLD AUTO: 14.3 10E3/UL (ref 4–11)

## 2025-03-11 PROCEDURE — 82248 BILIRUBIN DIRECT: CPT | Performed by: STUDENT IN AN ORGANIZED HEALTH CARE EDUCATION/TRAINING PROGRAM

## 2025-03-11 PROCEDURE — 250N000011 HC RX IP 250 OP 636: Performed by: SPECIALIST

## 2025-03-11 PROCEDURE — 250N000011 HC RX IP 250 OP 636: Performed by: STUDENT IN AN ORGANIZED HEALTH CARE EDUCATION/TRAINING PROGRAM

## 2025-03-11 PROCEDURE — 710N000009 HC RECOVERY PHASE 1, LEVEL 1, PER MIN: Performed by: SPECIALIST

## 2025-03-11 PROCEDURE — 255N000002 HC RX 255 OP 636: Performed by: SPECIALIST

## 2025-03-11 PROCEDURE — 999N000180 XR SURGERY CARM FLUORO LESS THAN 5 MIN

## 2025-03-11 PROCEDURE — 83690 ASSAY OF LIPASE: CPT | Performed by: STUDENT IN AN ORGANIZED HEALTH CARE EDUCATION/TRAINING PROGRAM

## 2025-03-11 PROCEDURE — 258N000003 HC RX IP 258 OP 636: Performed by: STUDENT IN AN ORGANIZED HEALTH CARE EDUCATION/TRAINING PROGRAM

## 2025-03-11 PROCEDURE — 36415 COLL VENOUS BLD VENIPUNCTURE: CPT | Performed by: STUDENT IN AN ORGANIZED HEALTH CARE EDUCATION/TRAINING PROGRAM

## 2025-03-11 PROCEDURE — 120N000001 HC R&B MED SURG/OB

## 2025-03-11 PROCEDURE — 250N000025 HC SEVOFLURANE, PER MIN: Performed by: SPECIALIST

## 2025-03-11 PROCEDURE — 258N000003 HC RX IP 258 OP 636

## 2025-03-11 PROCEDURE — T1013 SIGN LANG/ORAL INTERPRETER: HCPCS | Mod: U4,TEL,95 | Performed by: INTERPRETER

## 2025-03-11 PROCEDURE — 250N000013 HC RX MED GY IP 250 OP 250 PS 637: Performed by: STUDENT IN AN ORGANIZED HEALTH CARE EDUCATION/TRAINING PROGRAM

## 2025-03-11 PROCEDURE — 0FT44ZZ RESECTION OF GALLBLADDER, PERCUTANEOUS ENDOSCOPIC APPROACH: ICD-10-PCS | Performed by: SPECIALIST

## 2025-03-11 PROCEDURE — 250N000009 HC RX 250

## 2025-03-11 PROCEDURE — 370N000017 HC ANESTHESIA TECHNICAL FEE, PER MIN: Performed by: SPECIALIST

## 2025-03-11 PROCEDURE — 250N000013 HC RX MED GY IP 250 OP 250 PS 637: Performed by: SPECIALIST

## 2025-03-11 PROCEDURE — 99221 1ST HOSP IP/OBS SF/LOW 40: CPT | Mod: 57 | Performed by: SPECIALIST

## 2025-03-11 PROCEDURE — 84100 ASSAY OF PHOSPHORUS: CPT | Performed by: STUDENT IN AN ORGANIZED HEALTH CARE EDUCATION/TRAINING PROGRAM

## 2025-03-11 PROCEDURE — 272N000001 HC OR GENERAL SUPPLY STERILE: Performed by: SPECIALIST

## 2025-03-11 PROCEDURE — 82565 ASSAY OF CREATININE: CPT | Performed by: STUDENT IN AN ORGANIZED HEALTH CARE EDUCATION/TRAINING PROGRAM

## 2025-03-11 PROCEDURE — 250N000011 HC RX IP 250 OP 636

## 2025-03-11 PROCEDURE — 80051 ELECTROLYTE PANEL: CPT | Performed by: STUDENT IN AN ORGANIZED HEALTH CARE EDUCATION/TRAINING PROGRAM

## 2025-03-11 PROCEDURE — 47563 LAPARO CHOLECYSTECTOMY/GRAPH: CPT | Performed by: SPECIALIST

## 2025-03-11 PROCEDURE — 88304 TISSUE EXAM BY PATHOLOGIST: CPT | Mod: 26 | Performed by: PATHOLOGY

## 2025-03-11 PROCEDURE — 82962 GLUCOSE BLOOD TEST: CPT

## 2025-03-11 PROCEDURE — 84484 ASSAY OF TROPONIN QUANT: CPT | Performed by: STUDENT IN AN ORGANIZED HEALTH CARE EDUCATION/TRAINING PROGRAM

## 2025-03-11 PROCEDURE — 84155 ASSAY OF PROTEIN SERUM: CPT | Performed by: STUDENT IN AN ORGANIZED HEALTH CARE EDUCATION/TRAINING PROGRAM

## 2025-03-11 PROCEDURE — 83735 ASSAY OF MAGNESIUM: CPT | Performed by: STUDENT IN AN ORGANIZED HEALTH CARE EDUCATION/TRAINING PROGRAM

## 2025-03-11 PROCEDURE — 88304 TISSUE EXAM BY PATHOLOGIST: CPT | Mod: TC | Performed by: SPECIALIST

## 2025-03-11 PROCEDURE — 360N000083 HC SURGERY LEVEL 3 W/ FLUORO, PER MIN: Performed by: SPECIALIST

## 2025-03-11 PROCEDURE — 999N000141 HC STATISTIC PRE-PROCEDURE NURSING ASSESSMENT: Performed by: SPECIALIST

## 2025-03-11 PROCEDURE — 85027 COMPLETE CBC AUTOMATED: CPT | Performed by: STUDENT IN AN ORGANIZED HEALTH CARE EDUCATION/TRAINING PROGRAM

## 2025-03-11 PROCEDURE — 258N000003 HC RX IP 258 OP 636: Performed by: SPECIALIST

## 2025-03-11 PROCEDURE — 99232 SBSQ HOSP IP/OBS MODERATE 35: CPT | Performed by: STUDENT IN AN ORGANIZED HEALTH CARE EDUCATION/TRAINING PROGRAM

## 2025-03-11 PROCEDURE — 250N000011 HC RX IP 250 OP 636: Performed by: ANESTHESIOLOGY

## 2025-03-11 RX ORDER — HYDROMORPHONE HYDROCHLORIDE 1 MG/ML
0.5 INJECTION, SOLUTION INTRAMUSCULAR; INTRAVENOUS; SUBCUTANEOUS
Status: DISCONTINUED | OUTPATIENT
Start: 2025-03-11 | End: 2025-03-14

## 2025-03-11 RX ORDER — DEXAMETHASONE SODIUM PHOSPHATE 10 MG/ML
INJECTION, SOLUTION INTRAMUSCULAR; INTRAVENOUS PRN
Status: DISCONTINUED | OUTPATIENT
Start: 2025-03-11 | End: 2025-03-11

## 2025-03-11 RX ORDER — PROPOFOL 10 MG/ML
INJECTION, EMULSION INTRAVENOUS PRN
Status: DISCONTINUED | OUTPATIENT
Start: 2025-03-11 | End: 2025-03-11

## 2025-03-11 RX ORDER — ACETAMINOPHEN 325 MG/1
975 TABLET ORAL ONCE
Status: COMPLETED | OUTPATIENT
Start: 2025-03-11 | End: 2025-03-11

## 2025-03-11 RX ORDER — SODIUM CHLORIDE, SODIUM LACTATE, POTASSIUM CHLORIDE, CALCIUM CHLORIDE 600; 310; 30; 20 MG/100ML; MG/100ML; MG/100ML; MG/100ML
INJECTION, SOLUTION INTRAVENOUS CONTINUOUS
Status: DISCONTINUED | OUTPATIENT
Start: 2025-03-11 | End: 2025-03-11 | Stop reason: HOSPADM

## 2025-03-11 RX ORDER — HYDROMORPHONE HCL IN WATER/PF 6 MG/30 ML
0.2 PATIENT CONTROLLED ANALGESIA SYRINGE INTRAVENOUS EVERY 5 MIN PRN
Status: DISCONTINUED | OUTPATIENT
Start: 2025-03-11 | End: 2025-03-11 | Stop reason: HOSPADM

## 2025-03-11 RX ORDER — ONDANSETRON 4 MG/1
4 TABLET, ORALLY DISINTEGRATING ORAL EVERY 30 MIN PRN
Status: DISCONTINUED | OUTPATIENT
Start: 2025-03-11 | End: 2025-03-11 | Stop reason: HOSPADM

## 2025-03-11 RX ORDER — DEXAMETHASONE SODIUM PHOSPHATE 4 MG/ML
4 INJECTION, SOLUTION INTRA-ARTICULAR; INTRALESIONAL; INTRAMUSCULAR; INTRAVENOUS; SOFT TISSUE
Status: DISCONTINUED | OUTPATIENT
Start: 2025-03-11 | End: 2025-03-11 | Stop reason: HOSPADM

## 2025-03-11 RX ORDER — NITROGLYCERIN 0.4 MG/1
0.4 TABLET SUBLINGUAL EVERY 5 MIN PRN
Status: DISCONTINUED | OUTPATIENT
Start: 2025-03-11 | End: 2025-03-17 | Stop reason: HOSPADM

## 2025-03-11 RX ORDER — ONDANSETRON 2 MG/ML
4 INJECTION INTRAMUSCULAR; INTRAVENOUS EVERY 30 MIN PRN
Status: DISCONTINUED | OUTPATIENT
Start: 2025-03-11 | End: 2025-03-11 | Stop reason: HOSPADM

## 2025-03-11 RX ORDER — HYDRALAZINE HYDROCHLORIDE 10 MG/1
10 TABLET, FILM COATED ORAL EVERY 4 HOURS PRN
Status: DISCONTINUED | OUTPATIENT
Start: 2025-03-11 | End: 2025-03-17 | Stop reason: HOSPADM

## 2025-03-11 RX ORDER — CEFAZOLIN SODIUM/WATER 2 G/20 ML
2 SYRINGE (ML) INTRAVENOUS
Status: COMPLETED | OUTPATIENT
Start: 2025-03-11 | End: 2025-03-11

## 2025-03-11 RX ORDER — HYDRALAZINE HYDROCHLORIDE 20 MG/ML
10 INJECTION INTRAMUSCULAR; INTRAVENOUS EVERY 4 HOURS PRN
Status: DISCONTINUED | OUTPATIENT
Start: 2025-03-11 | End: 2025-03-17 | Stop reason: HOSPADM

## 2025-03-11 RX ORDER — SODIUM CHLORIDE, SODIUM LACTATE, POTASSIUM CHLORIDE, AND CALCIUM CHLORIDE .6; .31; .03; .02 G/100ML; G/100ML; G/100ML; G/100ML
IRRIGANT IRRIGATION PRN
Status: DISCONTINUED | OUTPATIENT
Start: 2025-03-11 | End: 2025-03-11 | Stop reason: HOSPADM

## 2025-03-11 RX ORDER — BUPIVACAINE HYDROCHLORIDE 2.5 MG/ML
INJECTION, SOLUTION INFILTRATION; PERINEURAL PRN
Status: DISCONTINUED | OUTPATIENT
Start: 2025-03-11 | End: 2025-03-11 | Stop reason: HOSPADM

## 2025-03-11 RX ORDER — MECLIZINE HYDROCHLORIDE 25 MG/1
25 TABLET ORAL 3 TIMES DAILY PRN
Status: DISCONTINUED | OUTPATIENT
Start: 2025-03-11 | End: 2025-03-17 | Stop reason: HOSPADM

## 2025-03-11 RX ORDER — FENTANYL CITRATE 50 UG/ML
50 INJECTION, SOLUTION INTRAMUSCULAR; INTRAVENOUS EVERY 5 MIN PRN
Status: DISCONTINUED | OUTPATIENT
Start: 2025-03-11 | End: 2025-03-11 | Stop reason: HOSPADM

## 2025-03-11 RX ORDER — NALOXONE HYDROCHLORIDE 0.4 MG/ML
0.1 INJECTION, SOLUTION INTRAMUSCULAR; INTRAVENOUS; SUBCUTANEOUS
Status: DISCONTINUED | OUTPATIENT
Start: 2025-03-11 | End: 2025-03-11 | Stop reason: HOSPADM

## 2025-03-11 RX ORDER — CEFAZOLIN SODIUM/WATER 2 G/20 ML
2 SYRINGE (ML) INTRAVENOUS SEE ADMIN INSTRUCTIONS
Status: DISCONTINUED | OUTPATIENT
Start: 2025-03-11 | End: 2025-03-11 | Stop reason: HOSPADM

## 2025-03-11 RX ORDER — FENTANYL CITRATE 50 UG/ML
25 INJECTION, SOLUTION INTRAMUSCULAR; INTRAVENOUS EVERY 5 MIN PRN
Status: DISCONTINUED | OUTPATIENT
Start: 2025-03-11 | End: 2025-03-11 | Stop reason: HOSPADM

## 2025-03-11 RX ORDER — ONDANSETRON 2 MG/ML
INJECTION INTRAMUSCULAR; INTRAVENOUS PRN
Status: DISCONTINUED | OUTPATIENT
Start: 2025-03-11 | End: 2025-03-11

## 2025-03-11 RX ORDER — FENTANYL CITRATE 50 UG/ML
INJECTION, SOLUTION INTRAMUSCULAR; INTRAVENOUS PRN
Status: DISCONTINUED | OUTPATIENT
Start: 2025-03-11 | End: 2025-03-11

## 2025-03-11 RX ORDER — KETOROLAC TROMETHAMINE 30 MG/ML
INJECTION, SOLUTION INTRAMUSCULAR; INTRAVENOUS PRN
Status: DISCONTINUED | OUTPATIENT
Start: 2025-03-11 | End: 2025-03-11

## 2025-03-11 RX ORDER — HYDROMORPHONE HCL IN WATER/PF 6 MG/30 ML
0.4 PATIENT CONTROLLED ANALGESIA SYRINGE INTRAVENOUS EVERY 5 MIN PRN
Status: DISCONTINUED | OUTPATIENT
Start: 2025-03-11 | End: 2025-03-11 | Stop reason: HOSPADM

## 2025-03-11 RX ADMIN — FENTANYL CITRATE 25 MCG: 50 INJECTION, SOLUTION INTRAMUSCULAR; INTRAVENOUS at 11:25

## 2025-03-11 RX ADMIN — HYDROMORPHONE HYDROCHLORIDE 0.5 MG: 1 INJECTION, SOLUTION INTRAMUSCULAR; INTRAVENOUS; SUBCUTANEOUS at 10:08

## 2025-03-11 RX ADMIN — HYDROMORPHONE HYDROCHLORIDE 0.5 MG: 1 INJECTION, SOLUTION INTRAMUSCULAR; INTRAVENOUS; SUBCUTANEOUS at 07:50

## 2025-03-11 RX ADMIN — FAMOTIDINE 20 MG: 10 INJECTION, SOLUTION INTRAVENOUS at 15:45

## 2025-03-11 RX ADMIN — ALUMINUM HYDROXIDE, MAGNESIUM HYDROXIDE, AND SIMETHICONE 30 ML: 200; 200; 20 SUSPENSION ORAL at 22:27

## 2025-03-11 RX ADMIN — PANTOPRAZOLE SODIUM 40 MG: 40 INJECTION, POWDER, FOR SOLUTION INTRAVENOUS at 08:15

## 2025-03-11 RX ADMIN — PROPOFOL 150 MG: 10 INJECTION, EMULSION INTRAVENOUS at 09:43

## 2025-03-11 RX ADMIN — HYDROMORPHONE HYDROCHLORIDE 0.5 MG: 1 INJECTION, SOLUTION INTRAMUSCULAR; INTRAVENOUS; SUBCUTANEOUS at 14:59

## 2025-03-11 RX ADMIN — HYDRALAZINE HYDROCHLORIDE 10 MG: 20 INJECTION INTRAMUSCULAR; INTRAVENOUS at 07:52

## 2025-03-11 RX ADMIN — HYDRALAZINE HYDROCHLORIDE 10 MG: 20 INJECTION INTRAMUSCULAR; INTRAVENOUS at 14:06

## 2025-03-11 RX ADMIN — SUGAMMADEX 200 MG: 100 INJECTION, SOLUTION INTRAVENOUS at 10:45

## 2025-03-11 RX ADMIN — SODIUM CHLORIDE: 0.9 INJECTION, SOLUTION INTRAVENOUS at 05:34

## 2025-03-11 RX ADMIN — MECLIZINE HYDROCHLORIDE 25 MG: 25 TABLET ORAL at 19:18

## 2025-03-11 RX ADMIN — PIPERACILLIN AND TAZOBACTAM 3.38 G: 3; .375 INJECTION, POWDER, FOR SOLUTION INTRAVENOUS at 18:25

## 2025-03-11 RX ADMIN — CALCIUM CARBONATE (ANTACID) CHEW TAB 500 MG 1000 MG: 500 CHEW TAB at 21:55

## 2025-03-11 RX ADMIN — SODIUM CHLORIDE: 0.9 INJECTION, SOLUTION INTRAVENOUS at 10:05

## 2025-03-11 RX ADMIN — PHENYLEPHRINE HYDROCHLORIDE 200 MCG: 10 INJECTION INTRAVENOUS at 10:16

## 2025-03-11 RX ADMIN — DEXAMETHASONE SODIUM PHOSPHATE 4 MG: 10 INJECTION, SOLUTION INTRAMUSCULAR; INTRAVENOUS at 09:43

## 2025-03-11 RX ADMIN — FENTANYL CITRATE 25 MCG: 50 INJECTION, SOLUTION INTRAMUSCULAR; INTRAVENOUS at 11:33

## 2025-03-11 RX ADMIN — PHENYLEPHRINE HYDROCHLORIDE 200 MCG: 10 INJECTION INTRAVENOUS at 09:51

## 2025-03-11 RX ADMIN — ACETAMINOPHEN 975 MG: 325 TABLET ORAL at 08:53

## 2025-03-11 RX ADMIN — FENTANYL CITRATE 50 MCG: 50 INJECTION, SOLUTION INTRAMUSCULAR; INTRAVENOUS at 09:42

## 2025-03-11 RX ADMIN — PHENYLEPHRINE HYDROCHLORIDE 200 MCG: 10 INJECTION INTRAVENOUS at 09:54

## 2025-03-11 RX ADMIN — ROCURONIUM 50 MG: 50 INJECTION, SOLUTION INTRAVENOUS at 09:43

## 2025-03-11 RX ADMIN — SENNOSIDES AND DOCUSATE SODIUM 2 TABLET: 50; 8.6 TABLET ORAL at 01:50

## 2025-03-11 RX ADMIN — HYDROMORPHONE HYDROCHLORIDE 0.5 MG: 1 INJECTION, SOLUTION INTRAMUSCULAR; INTRAVENOUS; SUBCUTANEOUS at 10:39

## 2025-03-11 RX ADMIN — ONDANSETRON 4 MG: 2 INJECTION INTRAMUSCULAR; INTRAVENOUS at 10:36

## 2025-03-11 RX ADMIN — Medication 2 G: at 09:37

## 2025-03-11 RX ADMIN — PHENYLEPHRINE HYDROCHLORIDE 200 MCG: 10 INJECTION INTRAVENOUS at 10:13

## 2025-03-11 RX ADMIN — KETOROLAC TROMETHAMINE 15 MG: 30 INJECTION, SOLUTION INTRAMUSCULAR at 10:33

## 2025-03-11 RX ADMIN — MIDAZOLAM HYDROCHLORIDE 2 MG: 1 INJECTION, SOLUTION INTRAMUSCULAR; INTRAVENOUS at 09:37

## 2025-03-11 RX ADMIN — PIPERACILLIN AND TAZOBACTAM 3.38 G: 3; .375 INJECTION, POWDER, FOR SOLUTION INTRAVENOUS at 01:49

## 2025-03-11 RX ADMIN — HYDROMORPHONE HYDROCHLORIDE 1 MG: 1 INJECTION, SOLUTION INTRAMUSCULAR; INTRAVENOUS; SUBCUTANEOUS at 23:01

## 2025-03-11 RX ADMIN — FENTANYL CITRATE 50 MCG: 50 INJECTION, SOLUTION INTRAMUSCULAR; INTRAVENOUS at 09:47

## 2025-03-11 ASSESSMENT — ACTIVITIES OF DAILY LIVING (ADL)
ADLS_ACUITY_SCORE: 48
ADLS_ACUITY_SCORE: 23
ADLS_ACUITY_SCORE: 48
ADLS_ACUITY_SCORE: 23
ADLS_ACUITY_SCORE: 48
ADLS_ACUITY_SCORE: 48
ADLS_ACUITY_SCORE: 26
ADLS_ACUITY_SCORE: 27
ADLS_ACUITY_SCORE: 26
ADLS_ACUITY_SCORE: 27
ADLS_ACUITY_SCORE: 27
ADLS_ACUITY_SCORE: 23
ADLS_ACUITY_SCORE: 48
ADLS_ACUITY_SCORE: 48
ADLS_ACUITY_SCORE: 27
ADLS_ACUITY_SCORE: 23
ADLS_ACUITY_SCORE: 23
ADLS_ACUITY_SCORE: 24
ADLS_ACUITY_SCORE: 48
ADLS_ACUITY_SCORE: 22
ADLS_ACUITY_SCORE: 27
ADLS_ACUITY_SCORE: 48
ADLS_ACUITY_SCORE: 48

## 2025-03-11 NOTE — PLAN OF CARE
Problem: Adult Inpatient Plan of Care  Goal: Plan of Care Review  Description: The Plan of Care Review/Shift note should be completed every shift.  The Outcome Evaluation is a brief statement about your assessment that the patient is improving, declining, or no change.  This information will be displayed automatically on your shift  note.  Outcome: Progressing  Flowsheets (Taken 3/10/2025 2216)  Plan of Care Reviewed With: patient   Goal Outcome Evaluation:      Plan of Care Reviewed With: patient      Writer took over this pt at 2100. Pt speaks Hmong and only speaks minimal english. Family was at bedside. Writer offered  services but pt refused and said family can help him. Pt alert and oriented times 4. BP was high. C/o abdomen pain which radiates to mid- epigastric chest. Gave prn dilaudid with only little relief. Also gave prn hydralazine for increased BP. BP came down slightly. Tele NSR and no changes.  IV fluid started. Pt was told about NPO. Wife at bedside.

## 2025-03-11 NOTE — PROGRESS NOTES
Patient admitted to room 3 at approximately 1225 via cart from surgery.  Reason for Admission: cholecystitis   Report received from: MARGAUX Pelayo  Patient was accompanied by Spouse.  Discharge transportation provided by:  Patient ambulated/transferred:  Assist of 2. air kishan.  Patient is alert and orientated x 3.  Outpatient Observation education provided to: (patient, family, friend)  MDRO Education done if applicable (MRSA, VRE, etc)  Safety risks were identified during admission:  none.  Yellow risk/fall band applied:  No  Home meds sent home: No  Home meds sent to pharmacy:No IF YES add 1/2 sheet laminated page reminder to chart/clipboard   Detailed Belongings: Cell phone &  shoes, clothing.

## 2025-03-11 NOTE — PROGRESS NOTES
Pt reports dizziness. Dr. Jun Parks consulted via [employer]. PRN meclizine PO 25 mg TID ordered.

## 2025-03-11 NOTE — CONSULTS
Consultation - Surgery  Blue Davey,  1970, MRN [id number]    Admitting Dx: Acute cholecystitis [K81.0]  Generalized abdominal pain [R10.84]  Elevated LFTs [R79.89]  Chest pain, unspecified type [R07.9]    PCP: [facility], [phone]   Code status:  Full Code       Extended Emergency Contact Information  Primary Emergency Contact: Keon Davye  Address: [address] [place] [address] [facility]  Mobile Phone: [phone]  Relation: Brother  Secondary Emergency Contact: Weston Davey  Address: [address] [facility] [place] [address] [facility]  Home Phone: [phone]  Relation: Father       Assessment and Plan   Impression:   Acute Cholecystitis.  A little worrisome for a common duct stone.  The MRCP was negative and his bilirubin is down today which is encouraging but I still plan to do an intraoperative cholangiogram.      Plan:  : With IOC.  Risk and benefits of surgery explained to him through an .  He asked appropriate questions and wishes to proceed.           Chief Complaint <principal problem not specified>       HPI    We have been requested by hospitalist service to evaluate Blue Davey for acute cholecystitis.  This is a 55 year old year old male an abrupt onset of abdominal pain yesterday.  He presented to the ED where he was found to have markedly elevated LFTs.  Imaging shows a thickened gallbladder wall with stones.  An MRCP did not show any evidence of common duct stones.  Had no previous abdominal surgery.       Medical History  Patient Active Problem List   Diagnosis    Adjustment disorder with depressed mood    Acute cholecystitis    Generalized abdominal pain    Elevated LFTs    Chest pain, unspecified type       [unfilled] Surgical History  No past surgical history on file.     Social History  Social History     Tobacco Use    Smoking status: Never    Smokeless tobacco: Never   Substance Use Topics    Alcohol use: No        Allergies  No Known Allergies Family History  Reviewed, and family history is not on file.  The Family history is not pertinent to the patients chief complaint. Psychosocial Needs  Social History     Social History Narrative    Not on file     Additional psychosocial needs reviewed per nursing assessment.     Prior to Admission Medications   No current outpatient medications        Review of Systems:  Pertinent items are noted in HPI. Physical Exam:  Temp:  [97.7  F (36.5  C)-99.2  F (37.3  C)] 98.7  F (37.1  C)  Pulse:  [] 121  Resp:  [14-31] 22  BP: (149-271)/() 190/91  SpO2:  [95 %-100 %] 97 %    General appearance: alert, appears stated age, and cooperative, appears somewhat ill  Eyes: No scleral icterus  Lungs: No shortness of breath.  Clear  Heart: Tachycardic to 120  Abdomen: Soft, tender across the upper abdomen.  No guarding or rebound.  Extremities: extremities, peripheral pulses normal  Pulses: 2+ and symmetric  Skin: Skin color, texture, turgor normal. No rashes or lesions  Neurologic: Grossly normal       Pertinent Labs  Lab Results: personally reviewed.   Lab Results   Component Value Date    WBC 14.3 03/11/2025    WBC 12.9 03/10/2025    HGB 14.3 03/11/2025    HGB 15.7 03/10/2025    HCT 44.4 03/11/2025    HCT 50.0 03/10/2025    MCV 88 03/11/2025    MCV 89 03/10/2025     03/11/2025     03/10/2025     Total bilirubin, 4.1 down to 1.7  Lipase 1660 down to 415   down to 80   Pertinent Radiology  Radiology Results: Personally reviewed image/s and Personally reviewed impression/s  EKG Results: not reviewed.

## 2025-03-11 NOTE — ANESTHESIA PREPROCEDURE EVALUATION
Anesthesia Pre-Procedure Evaluation    Patient: Blue Davey   MRN: [id number] : 1970        Procedure : Procedure(s):  Laparoscopic Cholecystectomy with Intra-operative Cholangiograms          History reviewed. No pertinent past medical history.   History reviewed. No pertinent surgical history.   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       Pulmonary           breath sounds clear to auscultation           OUTSIDE LABS:  CBC:   Lab Results   Component Value Date    WBC 14.3 (H) 2025    WBC 12.9 (H) 03/10/2025    HGB 14.3 2025    HGB 15.7 03/10/2025    HCT 44.4 2025    HCT 50.0 03/10/2025     2025     03/10/2025     BMP:   Lab Results   Component Value Date     2025     03/10/2025    POTASSIUM 3.9 2025    POTASSIUM 4.0 03/10/2025    CHLORIDE 107 2025    CHLORIDE 106 03/10/2025     "CO2 24 03/11/2025    CO2 23 03/10/2025    BUN 15.9 03/11/2025    BUN 13.4 03/10/2025    CR 0.99 03/11/2025    CR 1.08 03/10/2025    GLC 85 03/11/2025     (H) 03/11/2025     COAGS: No results found for: \"PTT\", \"INR\", \"FIBR\"  POC: No results found for: \"BGM\", \"HCG\", \"HCGS\"  HEPATIC:   Lab Results   Component Value Date    ALBUMIN 3.5 03/11/2025    PROTTOTAL 6.0 (L) 03/11/2025     (H) 03/11/2025    AST 80 (H) 03/11/2025    ALKPHOS 215 (H) 03/11/2025    BILITOTAL 1.7 (H) 03/11/2025     OTHER:   Lab Results   Component Value Date    A1C 5.1 03/10/2025    NIK 8.5 (L) 03/11/2025    PHOS 2.7 03/11/2025    MAG 2.1 03/11/2025    LIPASE 415 (H) 03/11/2025       Anesthesia Plan    ASA Status:  2       Anesthesia Type: General.     - Airway: ETT              Consents    Anesthesia Plan(s) and associated risks, benefits, and realistic alternatives discussed. Questions answered and patient/representative(s) expressed understanding.     - Discussed: Risks, Benefits and Alternatives for BOTH SEDATION and the PROCEDURE were discussed     - Discussed with:  Patient,             Postoperative Care            Comments:               Spenser Stanley MD    I have reviewed the pertinent notes and labs in the chart from the past 30 days and (re)examined the patient.  Any updates or changes from those notes are reflected in this note.    Clinically Significant Risk Factors Present on Admission           # Hypocalcemia: Lowest Ca = 8.5 mg/dL in last 2 days, will monitor and replace as appropriate                   # Overweight: Estimated body mass index is 26.47 kg/m  as calculated from the following:    Height as of this encounter: 1.575 m (5' 2\").    Weight as of this encounter: 65.6 kg (144 lb 11.2 oz).                "

## 2025-03-11 NOTE — OP NOTE
Operative Note    Name:  Blue Davey  PCP:  [facility]  Procedure Date:  3/11/2025       Procedure:  Procedure(s):  Laparoscopic Cholecystectomy with Intra-operative  CHOLANGIOGRAMS     Pre-Procedure Diagnosis:  Acute cholecystitis [K81.0]     Post-Procedure Diagnosis:    Same     Surgeon(s):    Lisette Purvis MD     Assistant: April Reeder PA-C      Anesthesia Type:  General     Indications:  Gentleman who presented with abdominal pain signs of acute cholecystitis with elevated LFTs.      Findings:   Normal intraoperative cholangiogram.    Operative Report:    The patient was brought to the operating suite where he was placed in the supine position.  He was prepped and draped in a sterile fashion after general anesthesia was administered.  A small incision was made below the umbilicus and the Veress needle passed into the abdominal cavity which was insufflated with carbon dioxide.  A 5 mm trocar port was then placed followed by the camera.  Under direct vision a 10 mm port was placed in the epigastric region.  Two 5 mm ports were placed in the right upper quadrant under direct vision.  With traction on the gallbladder dissection was carried out in the [place] of [place] and the cystic duct and artery were easily identified and dissected free.  A clip was placed up against the gallbladder and a small enterotomy made in the cystic duct.  Cholangiocatheter was passed through separate stab incision and passed into the duct and held firm with a clip.  Cholangiograms were obtained which showed no filling defect and good flow into the duodenum.   The camera was then replaced and the catheter removed from the duct.  The duct was then clipped and divided as was the artery.  The gallbladder was removed from the gallbladder bed with electrocautery with no difficulty and pulled up through the epigastric incision.   The port was replaced and the abdomen was irrigated until clear.  Hemostasis was assured.  All   the ports were removed and each site closed with subcuticular sutures of 4-0 Monocryl.  Each site was infiltrated with quarter percent Marcaine.  Sterile dressings were placed.  The patient tolerated the procedure well.      Estimated Blood Loss:   15 cc    Specimens:    ID Type Source Tests Collected by Time Destination   1 : GALLBLADDER Tissue Gallbladder SURGICAL PATHOLOGY EXAM Lisette Purvis MD 3/11/2025 10:04 AM            Drains:        Complications:    None    Lisette Purvis MD     Date: 3/11/2025  Time: 10:42 AM

## 2025-03-11 NOTE — SUMMARY OF CARE
Patient admitted to room 03 at approximately 12:42PM via cart from surgery.    Patient ambulated/transferred:  with two assist. air kishan.    Detailed List of Belongings (be very specific listing out each item):     No belongings

## 2025-03-11 NOTE — PROGRESS NOTES
Writer received report for this pt around 1910 but pt went to ultrasound and MRI after that per report. Writer met with family but pt still not here at this time.

## 2025-03-11 NOTE — PLAN OF CARE
"PRIMARY DIAGNOSIS: \"GENERIC\" NURSING  OUTPATIENT/OBSERVATION GOALS TO BE MET BEFORE DISCHARGE:  ADLs back to baseline: No    Activity and level of assistance: Assist of 2     Pain status: Improved but still requiring IV narcotics.    Return to near baseline physical activity: No     Discharge Planner Nurse   Safe discharge environment identified: Yes  Barriers to discharge: Yes       Entered by: Luis Spencer RN 03/11/2025 4:01 PM     Please review provider order for any additional goals.   Nurse to notify provider when observation goals have been met and patient is ready for discharge.Goal Outcome Evaluation:       A&O X 4 C/O abd pain hmong speaker  with ID # [id number] used BP high hospitalist notified hydralazine 10 mg IV was given 4 lap sites intact pt in bed.                  "

## 2025-03-11 NOTE — PLAN OF CARE
Problem: Pain Acute  Goal: Optimal Pain Control and Function  Outcome: Progressing  Intervention: Prevent or Manage Pain  Recent Flowsheet Documentation  Taken 3/11/2025 0000 by Elizabeth Melvin RN  Medication Review/Management: medications reviewed   Goal Outcome Evaluation:         Pt complaining of abdominal and chest pain. Dilaudid given, pt says pain is improving.  Also c/o nausea, zofran given with good effect.  BP also improving (161/86). Pt resting comfortably in bed.

## 2025-03-11 NOTE — PROGRESS NOTES
"[facility]    Medicine Progress Note - Hospitalist Service    Date of Admission:  3/10/2025    Assessment & Plan   Blue Davey is a 55 year old male with no significant past medical history and not currently on any medication presented to the hospital with right upper quadrant pain radiating to the back found to have acute cholecystitis and elevated LFTs.    #Acute cholecystitis   #Acute pancreatitis  Patient presented to the hospital with right upper quadrant abdominal pain radiating to the back which has been present since the prior morning. CTA chest abdomen pelvis showed acute pancreatitis without necrosis or pseudocyst and mild pericholecystic fluid with gallbladder wall thickening. LFTs were elevated and lipase as well. MRCP showed mild CBD dilatio nto 7mm but no evidence of choledocholithiasis. Surgery was consulted who performed a laparoscopic cholecystectomy on 3/11 and IOC did not show filling defects. LFTs down-trended on re-check. Possibly passed a stone. Spoke with GI who felt no ERCP was needed.  - Surgery consulted  - Monitor overnight  - Re-check LFTs in the morning  - Continue Zosyn overnight, can likely stop tomorrow    #Elevated blood pressure without a diagnosis of hypertension  IV hydralazine as needed for elevated blood pressure          Diet: Advance Diet as Tolerated: Clear Liquid Diet; Low Consistent Carb (45 g CHO per Meal) Diet    DVT Prophylaxis: Pneumatic Compression Devices  Hercules Catheter: Not present  Lines: None     Cardiac Monitoring: None  Code Status: Full Code      Clinically Significant Risk Factors Present on Admission           # Hypocalcemia: Lowest Ca = 8.5 mg/dL in last 2 days, will monitor and replace as appropriate                   # Overweight: Estimated body mass index is 26.49 kg/m  as calculated from the following:    Height as of this encounter: 1.575 m (5' 2\").    Weight as of this encounter: 65.7 kg (144 lb 13.5 oz).              Social " Drivers of Health            Disposition Plan     Medically Ready for Discharge: Anticipated Tomorrow             GRACE TENA MD  Hospitalist Service  [facility]  Securely message with [employer] (more info)  Text page via [employer] Paging/Directory   ______________________________________________________________________    Interval History   Underwent cholecystectomy    Physical Exam   Vital Signs: Temp: 98.6  F (37  C) Temp src: Oral BP: (!) 173/90 Pulse: 116   Resp: 18 SpO2: 95 % O2 Device: None (Room air) Oxygen Delivery: 2 LPM  Weight: 144 lbs 13.48 oz    General: No overt distress, conversational, non-toxic appearing  HEENT: MMM  Pulmonary: Normal effort  Cardiac: RRR.  Abdomen: Soft. ND. Diffuse TTP, localizes to RUQ.  Extremities: No bilateral LE edema  Neuro: alert and awake, Ox4      Medical Decision Making       40 MINUTES SPENT BY ME on the date of service doing chart review, history, exam, documentation & further activities per the note.      Data     I have personally reviewed the following data over the past 24 hrs:    14.3 (H)  \   14.3   / 201     137 107 15.9 /  112 (H)   3.9 24 0.99 \     ALT: 328 (H) AST: 80 (H) AP: 215 (H) TBILI: 1.7 (H)   ALB: 3.5 TOT PROTEIN: 6.0 (L) LIPASE: 415 (H)     Trop: 17 BNP: N/A     TSH: N/A T4: N/A A1C: N/A       Imaging results reviewed over the past 24 hrs:   Recent Results (from the past 24 hours)   CTA Chest Abdomen Pelvis w Contrast    Narrative    EXAM: CTA CHEST ABDOMEN PELVIS W CONTRAST  LOCATION: [facility]  DATE: 3/10/2025    INDICATION: Chest pain. Abdominal pain.  COMPARISON: 10/01/2015.  TECHNIQUE: CT angiogram chest abdomen pelvis during arterial phase of injection of IV contrast. 2D and 3D MIP reconstructions were performed by the CT technologist. Dose reduction techniques were used.   CONTRAST: 90 mL isovue 370    FINDINGS:   CT ANGIOGRAM CHEST, ABDOMEN, AND PELVIS: Mild atheromatous changes with  minimal associated calcification involving the abdominal aorta. No dissection, intimal tearing, significant stenosis, or aneurysmal dilatation identified involving the thoracic   aorta, abdominal aorta, iliac arteries, or great vessels arising from the abdominal aorta. There is good flow seen extending into both groins. The pulmonary arteries are well-opacified and show no evidence for PE.    LUNGS AND PLEURA: Benign calcified granuloma in the right lower lobe. Mild dependent atelectasis seen in both lower lobes with no central airway obstruction.    MEDIASTINUM/AXILLAE: Minimal thin mucus is seen in the trachea and the left mainstem bronchus. Mild cardiomegaly.    CORONARY ARTERY CALCIFICATION: None.    HEPATOBILIARY: Moderate to advanced fatty infiltration of the liver. Mild pericholecystic fluid with gallbladder thickening measuring up to approximately 4.6 mm. These findings can be associated with acute cholecystitis and an ultrasound may be of   benefit for further evaluation if clinically indicated. The bile ducts are normal in caliber. The hepatic/portal veins are patent.    PANCREAS: There is significant abnormal stranding of the fat with some minimal associated fluid seen surrounding the pancreas most marked in the head region consistent with acute pancreatitis. No evidence for a pseudocyst or necrotizing features. Benign   calcifications seen in the pancreatic head. Normal caliber pancreatic duct.    SPLEEN: Normal.    ADRENAL GLANDS: Normal.    KIDNEYS/BLADDER: Normal.    BOWEL: There is an appendicolith. The appendix is otherwise normal with no enlargement of the appendix or stranding of the adjacent fat identified.    LYMPH NODES: Normal.    PELVIC ORGANS: Mild prostatic gland enlargement.    MUSCULOSKELETAL: Small fatty umbilical hernia with no associated bowel or inflammation. Mild thoracolumbar spinal curvature with mild scattered hypertrophic changes.      Impression    IMPRESSION:  1.  Findings  consistent with acute pancreatitis with no necrotizing features or pseudocyst formation.    2.  Moderate to advanced fatty infiltration of the liver.    3.  Mild pericholecystic fluid with gallbladder wall thickening measuring up to 4.6 mm. These findings can be seen with acute cholecystitis and ultrasound may be of benefit for further evaluation.    4.  Calcifications seen in the pancreatic head with limited detail given motion artifact. I do not see any significant bile duct dilatation to suggest this calcification is in the distal common bile duct.    5.  No acute vascular abnormalities.    6.  Appendicolith with the appendix otherwise normal in appearance.     US Abdomen Limited    Narrative    EXAM: US ABDOMEN LIMITED  LOCATION: [facility]  DATE: 3/10/2025    INDICATION: RUq pain, look for stones  COMPARISON: CT performed the same day.  TECHNIQUE: Limited abdominal ultrasound.    FINDINGS:    GALLBLADDER: No stones are identified. Gallbladder wall thickening measuring up to 7 mm. Sonographic Ruggiero sign is negative.    BILE DUCTS: No biliary dilatation. The common duct measures 7 mm.    LIVER: Increased echogenicity from diffuse fatty infiltration. No focal mass. The portal vein is patent with flow in the normal direction. 3 cm cyst within the liver. Minimal intrahepatic biliary ductal dilation.    RIGHT KIDNEY: No hydronephrosis.    PANCREAS: The pancreas is largely obscured by overlying gas.    No ascites.      Impression    IMPRESSION:  1.  No stones are identified. Gallbladder wall is thickened which may be related to liver disease or acalculus cholecystitis.  2.  Mild hepatic steatosis.       MR Abdomen MRCP w/o & w Contrast    Narrative    EXAM: MR ABDOMEN MRCP W/O and W CONTRAST  LOCATION: [facility]  DATE: 3/10/2025    INDICATION: Abnormal LFTs, acute cholecystitis and pancreatitis, concern for biliary obstruction  COMPARISON: None.  TECHNIQUE: Routine  MR liver/pancreas protocol including axial and coronal MRCP sequences. 2D and 3D reconstruction performed by MR technologist including MIP reconstruction and slab cholangiograms. If performed with contrast, additional dynamic T1 post   IV contrast images.   CONTRAST: 7 mL Gadavist     FINDINGS:   Examination is partially degraded by motion artifact.    MRCP: There is mild dilation of the common bile duct up to 7 mm, however no choledocholithiasis is evident. No significant intrahepatic biliary ductal dilation.    There is mild gallbladder wall thickening near the fundus, which is favored to be related to focal adenomyomatosis. No significant adjacent inflammation or cholecystic fluid.    LIVER: Hepatic steatosis. Scattered benign hepatic cysts, measuring up to 2.9 cm in the right hepatic lobe; these require no follow-up. No suspicious liver lesions.    PANCREAS: As on CT, there is mild pancreatic and peripancreatic edema, however no evidence of parenchymal hypoenhancement, fluid collection, or ductal dilation.    ADDITIONAL FINDINGS: The spleen, adrenal glands and kidneys are unremarkable. Visualized bowel is normal in caliber. The abdominal aorta is nonaneurysmal. No lymphadenopathy.      Impression    IMPRESSION:  1.  Mild dilation of the common bile duct up to 7 mm, however no choledocholithiasis or intrahepatic biliary ductal dilation. Note that impacted ampullary stones may be occult on imaging.  2.  Mild gallbladder wall thickening near the fundus, which is favored to be related to focal adenomyomatosis. No significant adjacent inflammation or pericholecystic fluid.  3.  Similar findings of acute uncomplicated interstitial pancreatitis.  4.  Hepatic steatosis.     XR Surgery VIVIANE Fluoro L/T 5 Min    Narrative    This exam was marked as non-reportable because it will not be read by a   radiologist or a [place] non-radiologist provider.

## 2025-03-11 NOTE — PROVIDER NOTIFICATION
Pt's SBP still up at 180's even after giving prn pain med and IV hydralazine. Pt's abdomen pain and chest pain went only slightly down after IV prn Dilaudid. Dr. Gondal was paged and notified via [employer] paging. He said he will increase dilaudid dose and order nitroglycerin which might help his BP too.

## 2025-03-11 NOTE — ANESTHESIA CARE TRANSFER NOTE
Patient: Blue Davey    Procedure: Procedure(s):  Laparoscopic Cholecystectomy with Intra-operative  CHOLANGIOGRAMS       Diagnosis: Acute cholecystitis [K81.0]  Diagnosis Additional Information: No value filed.    Anesthesia Type:   General     Note:    Oropharynx: oropharynx clear of all foreign objects and spontaneously breathing  Level of Consciousness: drowsy  Oxygen Supplementation: face mask  Level of Supplemental Oxygen (L/min / FiO2): 6  Independent Airway: airway patency satisfactory and stable  Dentition: dentition unchanged  Vital Signs Stable: post-procedure vital signs reviewed and stable  Report to RN Given: handoff report given  Patient transferred to: PACU    Handoff Report: Identifed the Patient, Identified the Reponsible Provider, Reviewed the pertinent medical history, Discussed the surgical course, Reviewed Intra-OP anesthesia mangement and issues during anesthesia, Set expectations for post-procedure period and Allowed opportunity for questions and acknowledgement of understanding      Vitals:  Vitals Value Taken Time   /84 03/11/25 1053   Temp 36.7  C (98.1  F) 03/11/25 1053   Pulse 98 03/11/25 1053   Resp 15 03/11/25 1053   SpO2 95 % 03/11/25 1053   Vitals shown include unfiled device data.    Electronically Signed By: POPEYE Ortega CRNA  March 11, 2025  10:55 AM

## 2025-03-11 NOTE — PROGRESS NOTES
Pt arrived from MRI to room at 2100. BP at 2105 was elevated. Rechecked BP and was 213/124. Pt c/o abdomen which radiates to chest. He rated pain 9 out of 10. Gave dilaudid and IV hydralazine. Rechecked BP was 188/102 at 2130.

## 2025-03-11 NOTE — PROGRESS NOTES
Updated HAYDER Stanley on patient vitals- tachycardic in the 110s and SBPs 180s. Pain 5/10 and tolerable per patient, visually looks comfortable. Per HAYDER- okay with current vitals. No new orders.

## 2025-03-11 NOTE — ED NOTES
Bed: JNED-22  Expected date: 3/10/25  Expected time:   Means of arrival:   Comments:  [facility] when boarding

## 2025-03-11 NOTE — ANESTHESIA PROCEDURE NOTES
Airway       Patient location during procedure: OR       Procedure Start/Stop Times: 3/11/2025 9:46 AM  Staff -        CRNA: Doug Edward APRN CRNA       Performed By: CRNA  Consent for Airway        Urgency: elective  Indications and Patient Condition       Indications for airway management: sue-procedural       Induction type:intravenous       Mask difficulty assessment: 1 - vent by mask    Final Airway Details       Final airway type: endotracheal airway       Successful airway: ETT - single and Oral  Endotracheal Airway Details        ETT size (mm): 7.5       Cuffed: yes       Successful intubation technique: video laryngoscopy       VL Blade Size: Glidescope 3       Grade View of Cords: 1       Adjucts: stylet       Position: Left       Measured from: lips       Secured at (cm): 21       Bite block used: None    Post intubation assessment        Number of attempts at approach: 1       Number of other approaches attempted: 0       Secured with: silk tape       Ease of procedure: easy       Dentition: Intact and Unchanged    Medication(s) Administered   Medication Administration Time: 3/11/2025 9:46 AM       show

## 2025-03-11 NOTE — ANESTHESIA POSTPROCEDURE EVALUATION
Patient: Blue Davey    Procedure: Procedure(s):  Laparoscopic Cholecystectomy with Intra-operative  CHOLANGIOGRAMS       Anesthesia Type:  General    Note:  Disposition: Disposition Change/Cancellation; Inpatient   Postop Pain Control: Uneventful            Sign Out: Well controlled pain   PONV: No   Neuro/Psych: Uneventful            Sign Out: Acceptable/Baseline neuro status   Airway/Respiratory: Uneventful            Sign Out: Acceptable/Baseline resp. status   CV/Hemodynamics: Uneventful            Sign Out: Acceptable CV status; No obvious hypovolemia; No obvious fluid overload   Other NRE: NONE   DID A NON-ROUTINE EVENT OCCUR? No           Last vitals:  Vitals Value Taken Time   /94 03/11/25 1148   Temp 36.7  C (98.06  F) 03/11/25 1153   Pulse 117 03/11/25 1153   Resp 21 03/11/25 1148   SpO2 98 % 03/11/25 1153   Vitals shown include unfiled device data.    Electronically Signed By: Spenser Stanley MD  March 11, 2025  11:55 AM

## 2025-03-12 ENCOUNTER — VIRTUAL VISIT (OUTPATIENT)
Dept: INTERPRETER SERVICES | Facility: CLINIC | Age: 55
End: 2025-03-12

## 2025-03-12 PROBLEM — N17.9 AKI (ACUTE KIDNEY INJURY): Status: ACTIVE | Noted: 2025-03-12

## 2025-03-12 LAB
ALBUMIN SERPL BCG-MCNC: 3.3 G/DL (ref 3.5–5.2)
ALBUMIN SERPL BCG-MCNC: 3.6 G/DL (ref 3.5–5.2)
ALP SERPL-CCNC: 225 U/L (ref 40–150)
ALP SERPL-CCNC: 250 U/L (ref 40–150)
ALT SERPL W P-5'-P-CCNC: 155 U/L (ref 0–70)
ALT SERPL W P-5'-P-CCNC: 202 U/L (ref 0–70)
ANION GAP SERPL CALCULATED.3IONS-SCNC: 8 MMOL/L (ref 7–15)
ANION GAP SERPL CALCULATED.3IONS-SCNC: 8 MMOL/L (ref 7–15)
AST SERPL W P-5'-P-CCNC: 56 U/L (ref 0–45)
AST SERPL W P-5'-P-CCNC: 70 U/L (ref 0–45)
BASE EXCESS BLDV CALC-SCNC: 1.3 MMOL/L (ref -3–3)
BILIRUB DIRECT SERPL-MCNC: 2.42 MG/DL (ref 0–0.3)
BILIRUB SERPL-MCNC: 3.3 MG/DL
BILIRUB SERPL-MCNC: 4 MG/DL
BUN SERPL-MCNC: 18.2 MG/DL (ref 6–20)
BUN SERPL-MCNC: 19.2 MG/DL (ref 6–20)
CALCIUM SERPL-MCNC: 8.6 MG/DL (ref 8.8–10.4)
CALCIUM SERPL-MCNC: 8.9 MG/DL (ref 8.8–10.4)
CHLORIDE SERPL-SCNC: 106 MMOL/L (ref 98–107)
CHLORIDE SERPL-SCNC: 107 MMOL/L (ref 98–107)
CREAT SERPL-MCNC: 1.06 MG/DL (ref 0.67–1.17)
CREAT SERPL-MCNC: 1.27 MG/DL (ref 0.67–1.17)
EGFRCR SERPLBLD CKD-EPI 2021: 67 ML/MIN/1.73M2
EGFRCR SERPLBLD CKD-EPI 2021: 83 ML/MIN/1.73M2
ERYTHROCYTE [DISTWIDTH] IN BLOOD BY AUTOMATED COUNT: 14.1 % (ref 10–15)
ERYTHROCYTE [DISTWIDTH] IN BLOOD BY AUTOMATED COUNT: 14.2 % (ref 10–15)
GLUCOSE BLDC GLUCOMTR-MCNC: 100 MG/DL (ref 70–99)
GLUCOSE BLDC GLUCOMTR-MCNC: 137 MG/DL (ref 70–99)
GLUCOSE BLDC GLUCOMTR-MCNC: 91 MG/DL (ref 70–99)
GLUCOSE SERPL-MCNC: 107 MG/DL (ref 70–99)
GLUCOSE SERPL-MCNC: 128 MG/DL (ref 70–99)
HCO3 BLDV-SCNC: 26 MMOL/L (ref 21–28)
HCO3 SERPL-SCNC: 23 MMOL/L (ref 22–29)
HCO3 SERPL-SCNC: 24 MMOL/L (ref 22–29)
HCT VFR BLD AUTO: 41.6 % (ref 40–53)
HCT VFR BLD AUTO: 45.9 % (ref 40–53)
HGB BLD-MCNC: 13.3 G/DL (ref 13.3–17.7)
HGB BLD-MCNC: 14.6 G/DL (ref 13.3–17.7)
HOLD SPECIMEN: NORMAL
HOLD SPECIMEN: NORMAL
LACTATE SERPL-SCNC: 1.1 MMOL/L (ref 0.7–2)
LACTATE SERPL-SCNC: 1.2 MMOL/L (ref 0.7–2)
LIPASE SERPL-CCNC: >3000 U/L (ref 13–60)
MCH RBC QN AUTO: 28.1 PG (ref 26.5–33)
MCH RBC QN AUTO: 28.3 PG (ref 26.5–33)
MCHC RBC AUTO-ENTMCNC: 31.8 G/DL (ref 31.5–36.5)
MCHC RBC AUTO-ENTMCNC: 32 G/DL (ref 31.5–36.5)
MCV RBC AUTO: 88 FL (ref 78–100)
MCV RBC AUTO: 89 FL (ref 78–100)
O2/TOTAL GAS SETTING VFR VENT: 92 %
OXYHGB MFR BLDV: 85 % (ref 70–75)
PATH REPORT.COMMENTS IMP SPEC: NORMAL
PATH REPORT.COMMENTS IMP SPEC: NORMAL
PATH REPORT.FINAL DX SPEC: NORMAL
PATH REPORT.GROSS SPEC: NORMAL
PATH REPORT.MICROSCOPIC SPEC OTHER STN: NORMAL
PATH REPORT.RELEVANT HX SPEC: NORMAL
PCO2 BLDV: 38 MM HG (ref 40–50)
PH BLDV: 7.43 [PH] (ref 7.32–7.43)
PHOTO IMAGE: NORMAL
PLATELET # BLD AUTO: 177 10E3/UL (ref 150–450)
PLATELET # BLD AUTO: 214 10E3/UL (ref 150–450)
PO2 BLDV: 50 MM HG (ref 25–47)
POTASSIUM SERPL-SCNC: 3.7 MMOL/L (ref 3.4–5.3)
POTASSIUM SERPL-SCNC: 3.8 MMOL/L (ref 3.4–5.3)
PROT SERPL-MCNC: 5.8 G/DL (ref 6.4–8.3)
PROT SERPL-MCNC: 6.6 G/DL (ref 6.4–8.3)
RBC # BLD AUTO: 4.73 10E6/UL (ref 4.4–5.9)
RBC # BLD AUTO: 5.16 10E6/UL (ref 4.4–5.9)
SAO2 % BLDV: 85.9 % (ref 70–75)
SODIUM SERPL-SCNC: 138 MMOL/L (ref 135–145)
SODIUM SERPL-SCNC: 138 MMOL/L (ref 135–145)
WBC # BLD AUTO: 18.2 10E3/UL (ref 4–11)
WBC # BLD AUTO: 20.9 10E3/UL (ref 4–11)

## 2025-03-12 PROCEDURE — 250N000011 HC RX IP 250 OP 636: Performed by: STUDENT IN AN ORGANIZED HEALTH CARE EDUCATION/TRAINING PROGRAM

## 2025-03-12 PROCEDURE — 82040 ASSAY OF SERUM ALBUMIN: CPT | Performed by: SPECIALIST

## 2025-03-12 PROCEDURE — 83605 ASSAY OF LACTIC ACID: CPT | Performed by: INTERNAL MEDICINE

## 2025-03-12 PROCEDURE — 84155 ASSAY OF PROTEIN SERUM: CPT | Performed by: SPECIALIST

## 2025-03-12 PROCEDURE — 36415 COLL VENOUS BLD VENIPUNCTURE: CPT | Performed by: STUDENT IN AN ORGANIZED HEALTH CARE EDUCATION/TRAINING PROGRAM

## 2025-03-12 PROCEDURE — 99024 POSTOP FOLLOW-UP VISIT: CPT

## 2025-03-12 PROCEDURE — 250N000013 HC RX MED GY IP 250 OP 250 PS 637: Performed by: STUDENT IN AN ORGANIZED HEALTH CARE EDUCATION/TRAINING PROGRAM

## 2025-03-12 PROCEDURE — T1013 SIGN LANG/ORAL INTERPRETER: HCPCS | Mod: GT,TEL,95

## 2025-03-12 PROCEDURE — 36415 COLL VENOUS BLD VENIPUNCTURE: CPT | Performed by: INTERNAL MEDICINE

## 2025-03-12 PROCEDURE — 250N000013 HC RX MED GY IP 250 OP 250 PS 637

## 2025-03-12 PROCEDURE — 250N000011 HC RX IP 250 OP 636: Performed by: SPECIALIST

## 2025-03-12 PROCEDURE — 258N000003 HC RX IP 258 OP 636

## 2025-03-12 PROCEDURE — 99207 PR APP CREDIT; MD BILLING SHARED VISIT: CPT | Mod: FS

## 2025-03-12 PROCEDURE — 83690 ASSAY OF LIPASE: CPT | Performed by: SPECIALIST

## 2025-03-12 PROCEDURE — 250N000013 HC RX MED GY IP 250 OP 250 PS 637: Performed by: INTERNAL MEDICINE

## 2025-03-12 PROCEDURE — 250N000013 HC RX MED GY IP 250 OP 250 PS 637: Performed by: HOSPITALIST

## 2025-03-12 PROCEDURE — 87040 BLOOD CULTURE FOR BACTERIA: CPT | Performed by: INTERNAL MEDICINE

## 2025-03-12 PROCEDURE — 120N000001 HC R&B MED SURG/OB

## 2025-03-12 PROCEDURE — 85027 COMPLETE CBC AUTOMATED: CPT | Performed by: INTERNAL MEDICINE

## 2025-03-12 PROCEDURE — 250N000013 HC RX MED GY IP 250 OP 250 PS 637: Performed by: SPECIALIST

## 2025-03-12 PROCEDURE — 80048 BASIC METABOLIC PNL TOTAL CA: CPT | Performed by: INTERNAL MEDICINE

## 2025-03-12 PROCEDURE — 84295 ASSAY OF SERUM SODIUM: CPT | Performed by: STUDENT IN AN ORGANIZED HEALTH CARE EDUCATION/TRAINING PROGRAM

## 2025-03-12 PROCEDURE — 85014 HEMATOCRIT: CPT | Performed by: SPECIALIST

## 2025-03-12 PROCEDURE — T1013 SIGN LANG/ORAL INTERPRETER: HCPCS | Mod: U4,TEL,95 | Performed by: INTERPRETER

## 2025-03-12 PROCEDURE — 82248 BILIRUBIN DIRECT: CPT | Performed by: STUDENT IN AN ORGANIZED HEALTH CARE EDUCATION/TRAINING PROGRAM

## 2025-03-12 PROCEDURE — 99233 SBSQ HOSP IP/OBS HIGH 50: CPT | Mod: FS | Performed by: INTERNAL MEDICINE

## 2025-03-12 PROCEDURE — 82805 BLOOD GASES W/O2 SATURATION: CPT | Performed by: INTERNAL MEDICINE

## 2025-03-12 RX ORDER — SODIUM CHLORIDE 9 MG/ML
INJECTION, SOLUTION INTRAVENOUS CONTINUOUS
Status: DISCONTINUED | OUTPATIENT
Start: 2025-03-12 | End: 2025-03-12

## 2025-03-12 RX ORDER — SODIUM CHLORIDE, SODIUM LACTATE, POTASSIUM CHLORIDE, CALCIUM CHLORIDE 600; 310; 30; 20 MG/100ML; MG/100ML; MG/100ML; MG/100ML
INJECTION, SOLUTION INTRAVENOUS CONTINUOUS
Status: DISCONTINUED | OUTPATIENT
Start: 2025-03-12 | End: 2025-03-17

## 2025-03-12 RX ORDER — ACETAMINOPHEN 325 MG/1
975 TABLET ORAL EVERY 8 HOURS PRN
Status: CANCELLED | OUTPATIENT
Start: 2025-03-12

## 2025-03-12 RX ORDER — SIMETHICONE 80 MG
80 TABLET,CHEWABLE ORAL EVERY 6 HOURS PRN
Status: DISCONTINUED | OUTPATIENT
Start: 2025-03-12 | End: 2025-03-17 | Stop reason: HOSPADM

## 2025-03-12 RX ORDER — ACETAMINOPHEN 325 MG/1
650 TABLET ORAL EVERY 4 HOURS PRN
Status: DISCONTINUED | OUTPATIENT
Start: 2025-03-12 | End: 2025-03-17 | Stop reason: HOSPADM

## 2025-03-12 RX ORDER — ACETAMINOPHEN 650 MG/1
650 SUPPOSITORY RECTAL EVERY 4 HOURS PRN
Status: DISCONTINUED | OUTPATIENT
Start: 2025-03-12 | End: 2025-03-17 | Stop reason: HOSPADM

## 2025-03-12 RX ORDER — POLYETHYLENE GLYCOL 3350 17 G/17G
17 POWDER, FOR SOLUTION ORAL DAILY
Status: DISCONTINUED | OUTPATIENT
Start: 2025-03-12 | End: 2025-03-17 | Stop reason: HOSPADM

## 2025-03-12 RX ADMIN — PIPERACILLIN AND TAZOBACTAM 3.38 G: 3; .375 INJECTION, POWDER, FOR SOLUTION INTRAVENOUS at 17:17

## 2025-03-12 RX ADMIN — MECLIZINE HYDROCHLORIDE 25 MG: 25 TABLET ORAL at 08:50

## 2025-03-12 RX ADMIN — CALCIUM CARBONATE (ANTACID) CHEW TAB 500 MG 1000 MG: 500 CHEW TAB at 08:49

## 2025-03-12 RX ADMIN — PANTOPRAZOLE SODIUM 40 MG: 40 INJECTION, POWDER, FOR SOLUTION INTRAVENOUS at 08:47

## 2025-03-12 RX ADMIN — FAMOTIDINE 20 MG: 10 INJECTION, SOLUTION INTRAVENOUS at 15:24

## 2025-03-12 RX ADMIN — HYDRALAZINE HYDROCHLORIDE 10 MG: 20 INJECTION INTRAMUSCULAR; INTRAVENOUS at 03:39

## 2025-03-12 RX ADMIN — ALUMINUM HYDROXIDE, MAGNESIUM HYDROXIDE, AND SIMETHICONE 30 ML: 200; 200; 20 SUSPENSION ORAL at 06:56

## 2025-03-12 RX ADMIN — SIMETHICONE 80 MG: 80 TABLET, CHEWABLE ORAL at 03:31

## 2025-03-12 RX ADMIN — SENNOSIDES AND DOCUSATE SODIUM 2 TABLET: 50; 8.6 TABLET ORAL at 08:50

## 2025-03-12 RX ADMIN — ONDANSETRON 4 MG: 2 INJECTION, SOLUTION INTRAMUSCULAR; INTRAVENOUS at 08:51

## 2025-03-12 RX ADMIN — ONDANSETRON 4 MG: 4 TABLET, ORALLY DISINTEGRATING ORAL at 00:01

## 2025-03-12 RX ADMIN — OXYCODONE HYDROCHLORIDE 5 MG: 5 TABLET ORAL at 15:24

## 2025-03-12 RX ADMIN — OXYCODONE HYDROCHLORIDE 5 MG: 5 TABLET ORAL at 00:01

## 2025-03-12 RX ADMIN — PIPERACILLIN AND TAZOBACTAM 3.38 G: 3; .375 INJECTION, POWDER, FOR SOLUTION INTRAVENOUS at 02:25

## 2025-03-12 RX ADMIN — PIPERACILLIN AND TAZOBACTAM 3.38 G: 3; .375 INJECTION, POWDER, FOR SOLUTION INTRAVENOUS at 10:00

## 2025-03-12 RX ADMIN — SIMETHICONE 80 MG: 80 TABLET, CHEWABLE ORAL at 17:17

## 2025-03-12 RX ADMIN — OXYCODONE HYDROCHLORIDE 5 MG: 5 TABLET ORAL at 08:50

## 2025-03-12 RX ADMIN — POLYETHYLENE GLYCOL 3350 17 G: 17 POWDER, FOR SOLUTION ORAL at 08:44

## 2025-03-12 RX ADMIN — OXYCODONE HYDROCHLORIDE 5 MG: 5 TABLET ORAL at 03:30

## 2025-03-12 RX ADMIN — ONDANSETRON 4 MG: 2 INJECTION, SOLUTION INTRAMUSCULAR; INTRAVENOUS at 17:14

## 2025-03-12 RX ADMIN — ACETAMINOPHEN 650 MG: 325 TABLET, FILM COATED ORAL at 18:00

## 2025-03-12 RX ADMIN — SODIUM CHLORIDE, SODIUM LACTATE, POTASSIUM CHLORIDE, AND CALCIUM CHLORIDE: .6; .31; .03; .02 INJECTION, SOLUTION INTRAVENOUS at 10:00

## 2025-03-12 ASSESSMENT — ACTIVITIES OF DAILY LIVING (ADL)
ADLS_ACUITY_SCORE: 26

## 2025-03-12 NOTE — PLAN OF CARE
Notified Dr. Storm at 1703 PM regarding change in condition.  Fever of 101.6 and Abdominal distention.    Recommendation/request given to provider:  Response from Provider: labs ordered.  Comments: Dr. Storm came and saw patient.

## 2025-03-12 NOTE — PROGRESS NOTES
General Surgery Progress Note:    Hospital Day # 2    ASSESSMENT:  1. Generalized abdominal pain    2. Chest pain, unspecified type    3. Acute cholecystitis    4. Elevated LFTs        Blue Davey is a 55 year old male who is s/p laparoscopic cholecystectomy with negative IOC on 3/11/25. Postoperatively patient is tachycardic ( bpm) and HTN with elevation in t.bili this morning (4.1>1.7<3.3) and alk phos. Also elevation of lipase. With a negative IOC yesterday would not expect there to be choledocholithiasis though he is having pancreatitis. Will plan to watch and NPO with fluid resuscitation / conservative management today and recheck labs tomorrow am.     ADDENDUM 1140: patient abdomen remains distended and tender though slightly less firm and patient endorses feeling a bit better than this morning. Remains unable to pass flatus or stool, nausea better. Explained pancreatitis to patient and grandfather. Continue with plan.     PLAN:  -Sips of water with meds otherwise NPO  -LR added for fluids  -Ambulation as able if stable in halls  -Repeat labs tomorrow am  -Multimodal pain management avoiding IV narcotics as able  -Medical management per primary team    SUBJECTIVE:   Blue Davey was seen on rounds. States he feels quite bloated and is having abdominal pain. He is nauseated and unable to pass gas or have bowel movement postoperatively. He tried an orange yesterday and after felt increased pain. Felt dizzy with ambulation and sitting up so has not ambulated much from this and pain level. He is quite uncomfortable today. Has not drank much today. Is willing to have bowel regimen.    No fever or chills. Maybe emesis.     Daughter and patient are frustrated with care last night as far as nursing and pain management.     VITALS RANGE:  Temp:  [95.9  F (35.5  C)-98.7  F (37.1  C)] 98.5  F (36.9  C)  Pulse:  [] 120  Resp:  [8-31] 16  BP: (139-206)/() 139/65  SpO2:  [91 %-97 %] 91 %    PHYSICAL  EXAM:  General: patient seen sitting at edge of bed appears quite uncomfortable and moaning at times.  Resp: no increased work of breathing, breathing comfortably on nasal cannula  Abdomen: relatively firmly distended abdomen with incisions clean, dry, intact and steri strips in place. Bandaids removed. Tenderness with palpation unable to assess focal tenderness.   Extremities: No edema or cyanosis visualized on exam, no obvious deformities    03/11 0700 - 03/12 0659  In: 1800 [I.V.:1800]  Out: 150 [Urine:150]    Admission on 03/10/2025   Component Date Value    Sodium 03/10/2025 139     Potassium 03/10/2025 4.0     Carbon Dioxide (CO2) 03/10/2025 23     Anion Gap 03/10/2025 10     Urea Nitrogen 03/10/2025 13.4     Creatinine 03/10/2025 1.08     GFR Estimate 03/10/2025 81     Calcium 03/10/2025 9.3     Chloride 03/10/2025 106     Glucose 03/10/2025 107 (H)     Alkaline Phosphatase 03/10/2025 250 (H)     AST 03/10/2025 200 (H)     ALT 03/10/2025 445 (H)     Protein Total 03/10/2025 7.0     Albumin 03/10/2025 4.0     Bilirubin Total 03/10/2025 4.1 (H)     Lipase 03/10/2025 1,660 (H)     Troponin T, High Sensiti* 03/10/2025 16     WBC Count 03/10/2025 12.9 (H)     RBC Count 03/10/2025 5.62     Hemoglobin 03/10/2025 15.7     Hematocrit 03/10/2025 50.0     MCV 03/10/2025 89     MCH 03/10/2025 27.9     MCHC 03/10/2025 31.4 (L)     RDW 03/10/2025 13.3     Platelet Count 03/10/2025 219     % Neutrophils 03/10/2025 88     % Lymphocytes 03/10/2025 8     % Monocytes 03/10/2025 4     % Eosinophils 03/10/2025 0     % Basophils 03/10/2025 0     % Immature Granulocytes 03/10/2025 0     NRBCs per 100 WBC 03/10/2025 0     Absolute Neutrophils 03/10/2025 11.3 (H)     Absolute Lymphocytes 03/10/2025 1.0     Absolute Monocytes 03/10/2025 0.6     Absolute Eosinophils 03/10/2025 0.0     Absolute Basophils 03/10/2025 0.0     Absolute Immature Granul* 03/10/2025 0.1     Absolute NRBCs 03/10/2025 0.0     Troponin T, High Sensiti* 03/10/2025  14     Magnesium 03/10/2025 2.2     Phosphorus 03/10/2025 2.5     Estimated Average Glucose 03/10/2025 100     Hemoglobin A1C 03/10/2025 5.1     GLUCOSE BY METER POCT 03/10/2025 138 (H)     Sodium 03/11/2025 137     Potassium 03/11/2025 3.9     Carbon Dioxide (CO2) 03/11/2025 24     Anion Gap 03/11/2025 6 (L)     Urea Nitrogen 03/11/2025 15.9     Creatinine 03/11/2025 0.99     GFR Estimate 03/11/2025 90     Calcium 03/11/2025 8.5 (L)     Chloride 03/11/2025 107     Glucose 03/11/2025 112 (H)     Alkaline Phosphatase 03/11/2025 215 (H)     AST 03/11/2025 80 (H)     ALT 03/11/2025 328 (H)     Protein Total 03/11/2025 6.0 (L)     Albumin 03/11/2025 3.5     Bilirubin Total 03/11/2025 1.7 (H)     WBC Count 03/11/2025 14.3 (H)     RBC Count 03/11/2025 5.03     Hemoglobin 03/11/2025 14.3     Hematocrit 03/11/2025 44.4     MCV 03/11/2025 88     MCH 03/11/2025 28.4     MCHC 03/11/2025 32.2     RDW 03/11/2025 13.5     Platelet Count 03/11/2025 201     Magnesium 03/11/2025 2.1     Phosphorus 03/11/2025 2.7     Lipase 03/11/2025 415 (H)     Bilirubin Direct 03/11/2025 0.80 (H)     Troponin T, High Sensiti* 03/11/2025 14     Troponin T, High Sensiti* 03/11/2025 17     GLUCOSE BY METER POCT 03/11/2025 85     GLUCOSE BY METER POCT 03/11/2025 112 (H)     GLUCOSE BY METER POCT 03/11/2025 143 (H)     Protein Total 03/12/2025 6.6     Albumin 03/12/2025 3.6     Bilirubin Total 03/12/2025 3.3 (H)     Alkaline Phosphatase 03/12/2025 250 (H)     AST 03/12/2025 70 (H)     ALT 03/12/2025 202 (H)     Bilirubin Direct 03/12/2025 2.42 (H)     WBC Count 03/12/2025 20.9 (H)     RBC Count 03/12/2025 5.16     Hemoglobin 03/12/2025 14.6     Hematocrit 03/12/2025 45.9     MCV 03/12/2025 89     MCH 03/12/2025 28.3     MCHC 03/12/2025 31.8     RDW 03/12/2025 14.1     Platelet Count 03/12/2025 214     Sodium 03/12/2025 138     Potassium 03/12/2025 3.8     Chloride 03/12/2025 106     Carbon Dioxide (CO2) 03/12/2025 24     Anion Gap 03/12/2025 8     Urea  Nitrogen 03/12/2025 18.2     Creatinine 03/12/2025 1.27 (H)     GFR Estimate 03/12/2025 67     Calcium 03/12/2025 8.9     Glucose 03/12/2025 128 (H)         Selena Otto PA-C  [facility]  [address] [place] [address] [phone]

## 2025-03-12 NOTE — PROGRESS NOTES
[facility]    Medicine Progress Note - Hospitalist Service    Date of Admission:  3/10/2025    Assessment & Plan   Blue Davey is a 55 year old male with no significant past medical history and not currently on any medication presented to the hospital with right upper quadrant pain radiating to the back found to have acute cholecystitis and elevated LFTs..  Patient underwent cholecystectomy on 3/11/2025 without complication.  Patient feeling abdominal pain and bloating today.  Patient denies nausea vomiting.  Surgery team recommend patient stay overnight continue IV Zosyn and recheck labs in a.m. Patient n.p.o. pending surgery recommendations.      #Acute cholecystitis   #Acute pancreatitis  # Postop day 1 cholecystectomy  # Elevated LFTs  # Elevated lipase  Patient presented to the hospital with right upper quadrant abdominal pain radiating to the back which has been present since the prior morning.   CTA chest abdomen pelvis showed acute pancreatitis without necrosis or pseudocyst and mild pericholecystic fluid with gallbladder wall thickening.   LFTs were elevated: Trend LFTs in a.m.  MRCP showed mild CBD dilatio nto 7mm but no evidence of choledocholithiasis.   Surgery performed a laparoscopic cholecystectomy on 3/11   Surgery team recommendations:Sips of water with meds otherwise NPO,LR added for fluids Ambulation as able if stable in halls Repeat labs tomorrow am Multimodal pain management avoiding IV narcotics as able Medical management per primary team  Lipase trending up greater than 3000 from 415 prior to surgery  Trend lipase in a.m.    #Elevated blood pressure without a diagnosis of hypertension  Monitor blood pressure stable at this time  IV hydralazine as needed for elevated blood pressure    #POLLY  Creatinine 1.27 today, trending up from 0.99 yesterday  No known history of CKD  IV fluids lactated Ringer's 100 mL an hour  Trend BMP in a.m.    # Sepsis, rule out  #  "Leukocytosis  WBC 20.9 this morning trending up from 14.3 yesterday  Heart rate elevated 112  Lactic acid normal  IV fluids lactated Ringer's at 100-hour  IV Zosyn  Trend WBC in a.m.       Diet: NPO for Medical/Clinical Reasons Except for: Meds, Ice Chips, Other; Specify: sips of water only    DVT Prophylaxis: Pneumatic Compression Devices  Hercules Catheter: Not present  Lines: None     Cardiac Monitoring: None  Code Status: Full Code      Clinically Significant Risk Factors           # Hypocalcemia: Lowest Ca = 8.5 mg/dL in last 2 days, will monitor and replace as appropriate                    # Overweight: Estimated body mass index is 26.49 kg/m  as calculated from the following:    Height as of this encounter: 1.575 m (5' 2\").    Weight as of this encounter: 65.7 kg (144 lb 13.5 oz)., PRESENT ON ADMISSION            Social Drivers of Health            Disposition Plan     Medically Ready for Discharge: Anticipated in 2-4 Days         The patient's care was discussed with the Attending Physician, Dr. Storm .    Yina Love NP  Hospitalist Service  [facility]  Securely message with [employer] (more info)  Text page via [facility] Paging/Directory   ______________________________________________________________________    Interval History   patient abdomen remains distended and tender though slightly less firm and patient endorses feeling a bit better than this morning. Remains unable to pass flatus or stool, nausea better.   Patient denies chest pain shortness of breath or dizziness  Patient denies fever chills  Patient denies urinary symptom    Physical Exam   Vital Signs: Temp: 98.2  F (36.8  C) Temp src: Oral BP: (!) 162/90 Pulse: 112   Resp: 24 SpO2: 93 % O2 Device: None (Room air)    Weight: 144 lbs 13.48 oz    Constitutional: awake, alert, cooperative, no apparent distress, and appears stated age  Hematologic / Lymphatic: no cervical lymphadenopathy and no supraclavicular " lymphadenopathy  Respiratory: No increased work of breathing, good air exchange, clear to auscultation bilaterally, no crackles or wheezing  Cardiovascular: Normal apical impulse, regular rate and rhythm, normal S1 and S2, no S3 or S4, and no murmur noted  GI: No scars, normal bowel sounds, soft, distended, diffuse abd tender, no masses palpated, no hepatosplenomegally  Skin: no bruising or bleeding, normal skin color, texture, turgor, and no redness, warmth, or swelling  Musculoskeletal: There is no redness, warmth, or swelling of the joints.  Full range of motion noted.  Motor strength is 5 out of 5 all extremities bilaterally.    Neurologic: Awake, alert, oriented to name, place and time.    Neuropsychiatric: General: normal, calm, and normal eye contact    Medical Decision Making       50 MINUTES SPENT BY ME on the date of service doing chart review, history, exam, documentation & further activities per the note.      Data     I have personally reviewed the following data over the past 24 hrs:    20.9 (H)  \   14.6   / 214     138 106 18.2 /  137 (H)   3.8 24 1.27 (H) \     ALT: 202 (H) AST: 70 (H) AP: 250 (H) TBILI: 3.3 (H)   ALB: 3.6 TOT PROTEIN: 6.6 LIPASE: >3,000 (H)     Procal: N/A CRP: N/A Lactic Acid: 1.2         Imaging results reviewed over the past 24 hrs:   No results found for this or any previous visit (from the past 24 hours).

## 2025-03-12 NOTE — PLAN OF CARE
"  Problem: Pain Acute  Goal: Optimal Pain Control and Function  Outcome: Progressing     Problem: Surgery Nonspecified  Goal: Optimal Pain Control and Function  Outcome: Progressing     Problem: Surgery Nonspecified  Goal: Nausea and Vomiting Relief  Outcome: Progressing     Problem: Surgery Nonspecified  Goal: Effective Urinary Elimination  Outcome: Progressing     Problem: Surgery Nonspecified  Goal: Effective Oxygenation and Ventilation  Outcome: Progressing     Problem: Hypertension Acute  Goal: Blood Pressure Within Desired Range  Outcome: Progressing   Goal Outcome Evaluation:    Pt restless at start of shift due to abdominal discomfort and bloating( \"too much gas in stomach\" per pt}. Pain managed with prn oxycodone. Received iv dilaudid at start of shift. Prn simethicone given for gas discomfort. Encouraged ambulation to help with bloating. Up with assist of 1 with walker at gait belt. Pt's spouse at bedside. Elevated BP managed with prn hydralazine with noted improvement. Iv antibiotics given per orders. X4 lap sites with band aid.  "

## 2025-03-12 NOTE — SIGNIFICANT EVENT
ADDENDUM:  spiked a fever to 101.6.  will check blood cultures.  Having increased pain.  Will discuss with surgery.     Thien Storm MD

## 2025-03-12 NOTE — PLAN OF CARE
Problem: Adult Inpatient Plan of Care  Goal: Absence of Hospital-Acquired Illness or Injury  Intervention: Prevent Infection  Recent Flowsheet Documentation  Taken 3/11/2025 1545 by Chadd Pettit RN  Infection Prevention:   hand hygiene promoted   personal protective equipment utilized   single patient room provided     Problem: Adult Inpatient Plan of Care  Goal: Optimal Comfort and Wellbeing  3/12/2025 0037 by Chadd Pettit RN  Outcome: Progressing  3/11/2025 1604 by Chadd Pettit RN  Outcome: Progressing     Problem: Surgery Nonspecified  Goal: Absence of Bleeding  3/12/2025 0037 by Chadd Pettit RN  Outcome: Progressing  3/11/2025 1604 by Chadd Pettit RN  Outcome: Progressing       Goal Outcome Evaluation:      Plan of Care Reviewed With: patient, spouse    Overall Patient Progress: improvingOverall Patient Progress: improving       A & O x 4. VSS except hypertensive, tachycardic on RA. Pain managed with PRN medication. Denies nausea/SOB. , 143. L PIV SL. 45g CHO diet. Assist x 1. 4x abd lap sites, dressings CDI. Family stopped by for support. Surgery, hospitalist teams following. Nursing continue to monitor.

## 2025-03-12 NOTE — PLAN OF CARE
"  Problem: Adult Inpatient Plan of Care  Goal: Plan of Care Review  Description: The Plan of Care Review/Shift note should be completed every shift.  The Outcome Evaluation is a brief statement about your assessment that the patient is improving, declining, or no change.  This information will be displayed automatically on your shift  note.  Outcome: Not Progressing  Flowsheets (Taken 3/12/2025 1258)  Plan of Care Reviewed With:   patient   spouse  Overall Patient Progress: declining  Goal: Patient-Specific Goal (Individualized)  Description: You can add care plan individualizations to a care plan. Examples of Individualization might be:  \"Parent requests to be called daily at 9am for status\", \"I have a hard time hearing out of my right ear\", or \"Do not touch me to wake me up as it startles  me\".  Outcome: Not Progressing  Goal: Absence of Hospital-Acquired Illness or Injury  Outcome: Not Progressing  Intervention: Identify and Manage Fall Risk  Recent Flowsheet Documentation  Taken 3/12/2025 0840 by Brooke Martinez RN  Safety Promotion/Fall Prevention:   activity supervised   assistive device/personal items within reach   clutter free environment maintained   nonskid shoes/slippers when out of bed  Intervention: Prevent Skin Injury  Recent Flowsheet Documentation  Taken 3/12/2025 0840 by Brooke Martinez RN  Body Position: position changed independently  Goal: Optimal Comfort and Wellbeing  Outcome: Not Progressing  Intervention: Monitor Pain and Promote Comfort  Recent Flowsheet Documentation  Taken 3/12/2025 0840 by Brooke Martinez, RN  Pain Management Interventions: medication (see MAR)  Goal: Readiness for Transition of Care  Outcome: Not Progressing    Patient is alert and orientated x 4.  C/O abdominal pain 9/10 and was given PRN PO Oxycodone 5 mg with good relief.  Also c/o gas pain and nausea.  Was given PRN Tums, IV Zofran with good relief.  Patient concerned that he has not had a bowel movement since " last evening.  Miralax was ordered and given.  He was also given PRN PO [place].  Patient is assist of one, gait belt and walker to walk to the bathroom.

## 2025-03-13 ENCOUNTER — APPOINTMENT (OUTPATIENT)
Dept: PHYSICAL THERAPY | Facility: HOSPITAL | Age: 55
DRG: 417 | End: 2025-03-13
Attending: INTERNAL MEDICINE

## 2025-03-13 ENCOUNTER — VIRTUAL VISIT (OUTPATIENT)
Dept: INTERPRETER SERVICES | Facility: CLINIC | Age: 55
End: 2025-03-13

## 2025-03-13 ENCOUNTER — APPOINTMENT (OUTPATIENT)
Dept: RADIOLOGY | Facility: HOSPITAL | Age: 55
DRG: 417 | End: 2025-03-13
Attending: INTERNAL MEDICINE

## 2025-03-13 VITALS
HEART RATE: 87 BPM | WEIGHT: 146.4 LBS | SYSTOLIC BLOOD PRESSURE: 167 MMHG | DIASTOLIC BLOOD PRESSURE: 84 MMHG | OXYGEN SATURATION: 95 % | HEIGHT: 62 IN | BODY MASS INDEX: 26.94 KG/M2 | TEMPERATURE: 98.6 F | RESPIRATION RATE: 20 BRPM

## 2025-03-13 LAB
ALBUMIN SERPL BCG-MCNC: 3 G/DL (ref 3.5–5.2)
ALBUMIN UR-MCNC: 70 MG/DL
ALP SERPL-CCNC: 186 U/L (ref 40–150)
ALT SERPL W P-5'-P-CCNC: 124 U/L (ref 0–70)
ANION GAP SERPL CALCULATED.3IONS-SCNC: 9 MMOL/L (ref 7–15)
APPEARANCE UR: CLEAR
AST SERPL W P-5'-P-CCNC: 42 U/L (ref 0–45)
ATRIAL RATE - MUSE: 101 BPM
ATRIAL RATE - MUSE: 104 BPM
ATRIAL RATE - MUSE: 114 BPM
ATRIAL RATE - MUSE: 87 BPM
BACTERIA BLD CULT: NORMAL
BACTERIA BLD CULT: NORMAL
BILIRUB SERPL-MCNC: 2.2 MG/DL
BILIRUB UR QL STRIP: NEGATIVE
BUN SERPL-MCNC: 18.4 MG/DL (ref 6–20)
CALCIUM SERPL-MCNC: 8.4 MG/DL (ref 8.8–10.4)
CHLORIDE SERPL-SCNC: 107 MMOL/L (ref 98–107)
COLOR UR AUTO: YELLOW
CREAT SERPL-MCNC: 0.97 MG/DL (ref 0.67–1.17)
DIASTOLIC BLOOD PRESSURE - MUSE: NORMAL MMHG
EGFRCR SERPLBLD CKD-EPI 2021: >90 ML/MIN/1.73M2
ERYTHROCYTE [DISTWIDTH] IN BLOOD BY AUTOMATED COUNT: 14.5 % (ref 10–15)
GLUCOSE BLDC GLUCOMTR-MCNC: 122 MG/DL (ref 70–99)
GLUCOSE BLDC GLUCOMTR-MCNC: 151 MG/DL (ref 70–99)
GLUCOSE BLDC GLUCOMTR-MCNC: 78 MG/DL (ref 70–99)
GLUCOSE BLDC GLUCOMTR-MCNC: 84 MG/DL (ref 70–99)
GLUCOSE SERPL-MCNC: 88 MG/DL (ref 70–99)
GLUCOSE UR STRIP-MCNC: NEGATIVE MG/DL
HCO3 SERPL-SCNC: 22 MMOL/L (ref 22–29)
HCT VFR BLD AUTO: 38.1 % (ref 40–53)
HGB BLD-MCNC: 12.5 G/DL (ref 13.3–17.7)
HGB UR QL STRIP: NEGATIVE
INTERPRETATION ECG - MUSE: NORMAL
KETONES UR STRIP-MCNC: 10 MG/DL
LEUKOCYTE ESTERASE UR QL STRIP: NEGATIVE
LIPASE SERPL-CCNC: 145 U/L (ref 13–60)
MCH RBC QN AUTO: 29.1 PG (ref 26.5–33)
MCHC RBC AUTO-ENTMCNC: 32.8 G/DL (ref 31.5–36.5)
MCV RBC AUTO: 89 FL (ref 78–100)
MUCOUS THREADS #/AREA URNS LPF: PRESENT /LPF
NITRATE UR QL: NEGATIVE
P AXIS - MUSE: 51 DEGREES
P AXIS - MUSE: 54 DEGREES
P AXIS - MUSE: 61 DEGREES
P AXIS - MUSE: 67 DEGREES
PH UR STRIP: 6.5 [PH] (ref 5–7)
PLATELET # BLD AUTO: 164 10E3/UL (ref 150–450)
POTASSIUM SERPL-SCNC: 3.7 MMOL/L (ref 3.4–5.3)
PR INTERVAL - MUSE: 124 MS
PR INTERVAL - MUSE: 128 MS
PR INTERVAL - MUSE: 148 MS
PR INTERVAL - MUSE: 162 MS
PROT SERPL-MCNC: 5.7 G/DL (ref 6.4–8.3)
QRS DURATION - MUSE: 100 MS
QRS DURATION - MUSE: 102 MS
QRS DURATION - MUSE: 108 MS
QRS DURATION - MUSE: 112 MS
QT - MUSE: 336 MS
QT - MUSE: 352 MS
QT - MUSE: 358 MS
QT - MUSE: 374 MS
QTC - MUSE: 450 MS
QTC - MUSE: 456 MS
QTC - MUSE: 463 MS
QTC - MUSE: 470 MS
R AXIS - MUSE: 48 DEGREES
R AXIS - MUSE: 72 DEGREES
R AXIS - MUSE: 89 DEGREES
R AXIS - MUSE: 94 DEGREES
RBC # BLD AUTO: 4.29 10E6/UL (ref 4.4–5.9)
RBC URINE: 1 /HPF
SODIUM SERPL-SCNC: 138 MMOL/L (ref 135–145)
SP GR UR STRIP: 1.02 (ref 1–1.03)
SQUAMOUS EPITHELIAL: <1 /HPF
SYSTOLIC BLOOD PRESSURE - MUSE: NORMAL MMHG
T AXIS - MUSE: -1 DEGREES
T AXIS - MUSE: -22 DEGREES
T AXIS - MUSE: -23 DEGREES
T AXIS - MUSE: 9 DEGREES
TROPONIN T SERPL HS-MCNC: 19 NG/L
TROPONIN T SERPL HS-MCNC: 21 NG/L
UROBILINOGEN UR STRIP-MCNC: <2 MG/DL
VENTRICULAR RATE- MUSE: 101 BPM
VENTRICULAR RATE- MUSE: 104 BPM
VENTRICULAR RATE- MUSE: 114 BPM
VENTRICULAR RATE- MUSE: 87 BPM
WBC # BLD AUTO: 18.7 10E3/UL (ref 4–11)
WBC URINE: 2 /HPF

## 2025-03-13 PROCEDURE — 250N000011 HC RX IP 250 OP 636: Performed by: STUDENT IN AN ORGANIZED HEALTH CARE EDUCATION/TRAINING PROGRAM

## 2025-03-13 PROCEDURE — 250N000011 HC RX IP 250 OP 636: Performed by: SPECIALIST

## 2025-03-13 PROCEDURE — 36415 COLL VENOUS BLD VENIPUNCTURE: CPT

## 2025-03-13 PROCEDURE — 84484 ASSAY OF TROPONIN QUANT: CPT | Performed by: INTERNAL MEDICINE

## 2025-03-13 PROCEDURE — 83690 ASSAY OF LIPASE: CPT

## 2025-03-13 PROCEDURE — 250N000013 HC RX MED GY IP 250 OP 250 PS 637: Performed by: INTERNAL MEDICINE

## 2025-03-13 PROCEDURE — 84132 ASSAY OF SERUM POTASSIUM: CPT

## 2025-03-13 PROCEDURE — 97161 PT EVAL LOW COMPLEX 20 MIN: CPT | Mod: GP

## 2025-03-13 PROCEDURE — 93005 ELECTROCARDIOGRAM TRACING: CPT | Performed by: INTERNAL MEDICINE

## 2025-03-13 PROCEDURE — 250N000009 HC RX 250: Performed by: INTERNAL MEDICINE

## 2025-03-13 PROCEDURE — 81001 URINALYSIS AUTO W/SCOPE: CPT | Performed by: INTERNAL MEDICINE

## 2025-03-13 PROCEDURE — 99024 POSTOP FOLLOW-UP VISIT: CPT

## 2025-03-13 PROCEDURE — 120N000001 HC R&B MED SURG/OB

## 2025-03-13 PROCEDURE — 99232 SBSQ HOSP IP/OBS MODERATE 35: CPT | Mod: FS | Performed by: INTERNAL MEDICINE

## 2025-03-13 PROCEDURE — 250N000013 HC RX MED GY IP 250 OP 250 PS 637: Performed by: SPECIALIST

## 2025-03-13 PROCEDURE — 82040 ASSAY OF SERUM ALBUMIN: CPT

## 2025-03-13 PROCEDURE — 85027 COMPLETE CBC AUTOMATED: CPT

## 2025-03-13 PROCEDURE — 99207 PR APP CREDIT; MD BILLING SHARED VISIT: CPT | Mod: FS

## 2025-03-13 PROCEDURE — 71046 X-RAY EXAM CHEST 2 VIEWS: CPT

## 2025-03-13 PROCEDURE — 97116 GAIT TRAINING THERAPY: CPT | Mod: GP

## 2025-03-13 PROCEDURE — T1013 SIGN LANG/ORAL INTERPRETER: HCPCS | Mod: U4,TEL,95

## 2025-03-13 PROCEDURE — 36415 COLL VENOUS BLD VENIPUNCTURE: CPT | Performed by: INTERNAL MEDICINE

## 2025-03-13 PROCEDURE — 250N000013 HC RX MED GY IP 250 OP 250 PS 637: Performed by: STUDENT IN AN ORGANIZED HEALTH CARE EDUCATION/TRAINING PROGRAM

## 2025-03-13 PROCEDURE — 258N000003 HC RX IP 258 OP 636

## 2025-03-13 RX ORDER — NALOXONE HYDROCHLORIDE 0.4 MG/ML
0.4 INJECTION, SOLUTION INTRAMUSCULAR; INTRAVENOUS; SUBCUTANEOUS
Status: DISCONTINUED | OUTPATIENT
Start: 2025-03-13 | End: 2025-03-17 | Stop reason: HOSPADM

## 2025-03-13 RX ORDER — NALOXONE HYDROCHLORIDE 0.4 MG/ML
0.2 INJECTION, SOLUTION INTRAMUSCULAR; INTRAVENOUS; SUBCUTANEOUS
Status: DISCONTINUED | OUTPATIENT
Start: 2025-03-13 | End: 2025-03-17 | Stop reason: HOSPADM

## 2025-03-13 RX ORDER — METOPROLOL TARTRATE 1 MG/ML
5 INJECTION, SOLUTION INTRAVENOUS ONCE
Status: COMPLETED | OUTPATIENT
Start: 2025-03-13 | End: 2025-03-13

## 2025-03-13 RX ORDER — AMLODIPINE BESYLATE 5 MG/1
10 TABLET ORAL DAILY
Status: DISCONTINUED | OUTPATIENT
Start: 2025-03-13 | End: 2025-03-13

## 2025-03-13 RX ORDER — METOPROLOL SUCCINATE 50 MG/1
50 TABLET, EXTENDED RELEASE ORAL DAILY
Status: DISCONTINUED | OUTPATIENT
Start: 2025-03-13 | End: 2025-03-17 | Stop reason: HOSPADM

## 2025-03-13 RX ADMIN — METOPROLOL TARTRATE 5 MG: 5 INJECTION INTRAVENOUS at 22:02

## 2025-03-13 RX ADMIN — PIPERACILLIN AND TAZOBACTAM 3.38 G: 3; .375 INJECTION, POWDER, FOR SOLUTION INTRAVENOUS at 15:31

## 2025-03-13 RX ADMIN — ACETAMINOPHEN 650 MG: 325 TABLET, FILM COATED ORAL at 09:51

## 2025-03-13 RX ADMIN — PANTOPRAZOLE SODIUM 40 MG: 40 INJECTION, POWDER, FOR SOLUTION INTRAVENOUS at 22:02

## 2025-03-13 RX ADMIN — PIPERACILLIN AND TAZOBACTAM 3.38 G: 3; .375 INJECTION, POWDER, FOR SOLUTION INTRAVENOUS at 07:59

## 2025-03-13 RX ADMIN — ALUMINUM HYDROXIDE, MAGNESIUM HYDROXIDE, AND SIMETHICONE 30 ML: 200; 200; 20 SUSPENSION ORAL at 18:08

## 2025-03-13 RX ADMIN — ALUMINUM HYDROXIDE, MAGNESIUM HYDROXIDE, AND SIMETHICONE 30 ML: 200; 200; 20 SUSPENSION ORAL at 22:13

## 2025-03-13 RX ADMIN — PIPERACILLIN AND TAZOBACTAM 3.38 G: 3; .375 INJECTION, POWDER, FOR SOLUTION INTRAVENOUS at 02:09

## 2025-03-13 RX ADMIN — ONDANSETRON 4 MG: 2 INJECTION, SOLUTION INTRAMUSCULAR; INTRAVENOUS at 02:09

## 2025-03-13 RX ADMIN — METOPROLOL SUCCINATE 50 MG: 50 TABLET, EXTENDED RELEASE ORAL at 23:01

## 2025-03-13 RX ADMIN — ACETAMINOPHEN 650 MG: 325 TABLET, FILM COATED ORAL at 17:39

## 2025-03-13 RX ADMIN — ACETAMINOPHEN 650 MG: 325 TABLET, FILM COATED ORAL at 04:09

## 2025-03-13 RX ADMIN — HYDRALAZINE HYDROCHLORIDE 10 MG: 20 INJECTION INTRAMUSCULAR; INTRAVENOUS at 04:09

## 2025-03-13 RX ADMIN — HYDRALAZINE HYDROCHLORIDE 10 MG: 20 INJECTION INTRAMUSCULAR; INTRAVENOUS at 09:52

## 2025-03-13 RX ADMIN — HYDRALAZINE HYDROCHLORIDE 10 MG: 10 TABLET ORAL at 15:37

## 2025-03-13 RX ADMIN — SODIUM CHLORIDE, SODIUM LACTATE, POTASSIUM CHLORIDE, AND CALCIUM CHLORIDE: .6; .31; .03; .02 INJECTION, SOLUTION INTRAVENOUS at 12:58

## 2025-03-13 RX ADMIN — HYDROMORPHONE HYDROCHLORIDE 1 MG: 1 INJECTION, SOLUTION INTRAMUSCULAR; INTRAVENOUS; SUBCUTANEOUS at 22:01

## 2025-03-13 RX ADMIN — NITROGLYCERIN 0.4 MG: 0.4 TABLET SUBLINGUAL at 18:17

## 2025-03-13 RX ADMIN — NITROGLYCERIN 0.4 MG: 0.4 TABLET SUBLINGUAL at 17:00

## 2025-03-13 RX ADMIN — FAMOTIDINE 20 MG: 10 INJECTION, SOLUTION INTRAVENOUS at 15:37

## 2025-03-13 RX ADMIN — PANTOPRAZOLE SODIUM 40 MG: 40 INJECTION, POWDER, FOR SOLUTION INTRAVENOUS at 07:58

## 2025-03-13 RX ADMIN — OXYCODONE HYDROCHLORIDE 5 MG: 5 TABLET ORAL at 16:53

## 2025-03-13 RX ADMIN — NITROGLYCERIN 0.4 MG: 0.4 TABLET SUBLINGUAL at 18:06

## 2025-03-13 ASSESSMENT — ACTIVITIES OF DAILY LIVING (ADL)
ADLS_ACUITY_SCORE: 30
ADLS_ACUITY_SCORE: 26
ADLS_ACUITY_SCORE: 30

## 2025-03-13 NOTE — PLAN OF CARE
Problem: Adult Inpatient Plan of Care  Goal: Plan of Care Review  Description: The Plan of Care Review/Shift note should be completed every shift.  The Outcome Evaluation is a brief statement about your assessment that the patient is improving, declining, or no change.  This information will be displayed automatically on your shift  note.  Outcome: Progressing  Flowsheets (Taken 3/12/2025 2217)  Outcome Evaluation: pt continues to c/o abd pain  Plan of Care Reviewed With: patient  Overall Patient Progress: no change   Goal Outcome Evaluation:      Plan of Care Reviewed With: patient    Overall Patient Progress: no changeOverall Patient Progress: no change    Outcome Evaluation: pt continues to c/o abd pain

## 2025-03-13 NOTE — SIGNIFICANT EVENT
Called for patient having chest pain about 17:30.  NTG SL x1 given with some relief.  BP quite high but improved some with NTG.  EKG with TWI anterolat that are new.  2nd and 3rd NTG given along with maalox 30 mL have relieved discomfort.  Patient states he has had chest discomfort since before admission but worse this afternoon and possibly some different.  Does have some pleuritic component.  Troponin is 19.  Will repeat ECG and follow on tele.  EKG changes could be related to recent lap ally.  One dose of IV metoprolol and may need oral meds for BP (he states his BP does tend to run high when he goes to the doctor and he doesn't ever check it at home).       Addendum: 20:30 - 2nd troponin negative as well and repeat EKG is slightly improved.  Will start oral toprol. May benefit from outpatient nuclear stress test.    Thien Storm MD

## 2025-03-13 NOTE — PROGRESS NOTES
[facility]    Medicine Progress Note - Hospitalist Service    Date of Admission:  3/10/2025    Assessment & Plan   Blue Davey is a 55 year old male with no significant past medical history and not currently on any medication presented to the hospital with right upper quadrant pain radiating to the back found to have acute cholecystitis and elevated LFTs..  Patient underwent cholecystectomy on 3/11/2025 without complication.      Patient feeling abdominal pain and bloating on 3/12/25.  Patient denied nausea or vomiting.  Surgery team had recommended patient stay overnight. Continue IV Zosyn and recheck labs in a.m. Patient n.p.o. pending surgery recommendations.    Patient developed a fever and increased pain in the evening time of 3/12/25.  Repeat WBC drawn-18.2, which was down from a.m. labs of 20.9.  Blood gases drawn and were unremarkable.  Blood cultures pending. Repeat liver enzymes also done and are trending down.  Lactic drawn and was 1.1.  Chest x-ray ordered for this a.m. 3/13/2025 which showed no acute processes.    3/13/25-today on exam patient reports he is feeling much better and continues to request food.  Patient abdomen soft.  No tenderness.  Patient denies nausea or vomiting.  Continues to have low-grade temps.  Patient states he is passing gas and has had 1 or 2 bowel movements.  Per surgery team, okay for clear liquid diet today.  Encourage ambulation.  Continue IV fluids.      Acute cholecystitis   Acute pancreatitis  Postop day 2 cholecystectomy  Elevated LFTs  Elevated lipase  -Patient presented to the hospital with right upper quadrant abdominal pain radiating to the back which has been present since the prior morning.   -CTA chest abdomen pelvis showed acute pancreatitis without necrosis or pseudocyst and mild pericholecystic fluid with gallbladder wall thickening.   -LFTs were elevated: Now trending down  -MRCP showed mild CBD dilatio nto 7mm but no evidence of  "choledocholithiasis.   -Surgery performed a laparoscopic cholecystectomy on 3/11   -Surgery team recommendations:Sips of water with meds otherwise NPO,LR added for fluids Ambulation as able if stable in halls Repeat labs tomorrow am Multimodal pain management avoiding IV narcotics as able Medical management per primary team  -Lipase had trended up greater than 3000 from 415 prior to surgery.  Now down to 145 today 3/13/2025     Elevated blood pressure without a diagnosis of hypertension  -Continue to monitor -stable at this time  -IV hydralazine as needed      POLLY-resolved  -Creatinine went up to 1.27 on 3/12/25 from 0.99. After IVF's creatinine normalized at 0.97  -No known history of CKD  -IV fluids lactated Ringer's 100 mL an hour  -Continue to trend BMP      Sepsis, rule out  Leukocytosis  -WBC 20.9 trended up from 14.3 on 3/12/25. Now down to 18.7  -Heart rate tachycardic  -Lactic acid normal  -IV fluids lactated Ringer's at 100-hour  -IV Zosyn  -Continue to trend WBC           Diet: Clear Liquid Diet (limit carbonation and caffeine)    DVT Prophylaxis: Pneumatic Compression Devices  Hercules Catheter: Not present  Lines: None     Cardiac Monitoring: None  Code Status: Full Code      Clinically Significant Risk Factors               # Hypoalbuminemia: Lowest albumin = 3 g/dL at 3/13/2025  6:58 AM, will monitor as appropriate                # Overweight: Estimated body mass index is 26.49 kg/m  as calculated from the following:    Height as of this encounter: 1.575 m (5' 2\").    Weight as of this encounter: 65.7 kg (144 lb 13.5 oz)., PRESENT ON ADMISSION            Social Drivers of Health            Disposition Plan     Medically Ready for Discharge: Anticipated Tomorrow           The patient's care was discussed with the Attending Physician, Dr. Storm and Patient.    Samantha Oates NP  Hospitalist Service  [facility]  Securely message with [email or web address] (more info)  Text page via [employer] " [place]/Directory   ______________________________________________________________________    Interval History     Physical Exam   Vital Signs: Temp: 99.1  F (37.3  C) Temp src: Oral BP: (!) 192/91 Pulse: 110   Resp: 18 SpO2: 93 % O2 Device: None (Room air)    Weight: 144 lbs 13.48 oz    Constitutional: awake, alert, cooperative, no apparent distress, and appears stated age  Respiratory: No increased work of breathing, good air exchange, clear to auscultation bilaterally, no crackles or wheezing  Cardiovascular: Normal apical impulse, regular rate and rhythm, normal S1 and S2, no S3 or S4, and no murmur noted  GI: No scars, normal bowel sounds, soft, distended, non-tender, no masses palpated, no hepatosplenomegally    Medical Decision Making       50 MINUTES SPENT BY ME on the date of service doing chart review, history, exam, documentation & further activities per the note.      Data     I have personally reviewed the following data over the past 24 hrs:    18.7 (H)  \   12.5 (L)   / 164     138 107 18.4 /  78   3.7 22 0.97 \     ALT: 124 (H) AST: 42 AP: 186 (H) TBILI: 2.2 (H)   ALB: 3.0 (L) TOT PROTEIN: 5.7 (L) LIPASE: 145 (H)     Procal: N/A CRP: N/A Lactic Acid: 1.1         Imaging results reviewed over the past 24 hrs:   Recent Results (from the past 24 hours)   XR Chest 2 Views    Narrative    EXAM: XR CHEST 2 VIEWS  LOCATION: [facility]  DATE: 3/13/2025    INDICATION: Fever  COMPARISON: CT 3/10/2023      Impression    IMPRESSION: Stable enlarged cardiac silhouette. No pulmonary vascular congestion. No significant effusion seen. Stable lateral lung base opacity could represent atelectasis, scar, or pneumonia.

## 2025-03-13 NOTE — PROGRESS NOTES
Occupational Therapy:  Pt with no OT needs at this time.  PT states pt is moving close to baseline but will keep seeing to increase safety with mobility.  Pt able to complete ADL's with SBA with PT and nursing while hospitalized. Will discontinue OT at this time.    Vilma Blakely OTR/L, CLT  3/13/2025

## 2025-03-13 NOTE — PROGRESS NOTES
"General Surgery Progress Note:    Hospital Day # 3    ASSESSMENT:  1. Generalized abdominal pain    2. Chest pain, unspecified type    3. Acute cholecystitis    4. Elevated LFTs        Blue Davey is a 55 year old male who is s/p laparoscopic cholecystectomy with negative IOC on 3/11/25. Postoperative course complicated by tachycardia (105-128), elevation in T. Bili and alk phos yesterday with downtrend today, and pancreatitis with good downtrend in lipase today. Negative MRCP prior to surgery and negative IOC would not expect biliary obstruction. Patient also has ileus picture with distention and abdominal pain. Unfortunately overnight febrile as well up to 101.6F, CXR generally clear. Patient is improving will trial clear liquids and continue monitoring. No surgical interventions planned.    ADDENDUM 1400: Patient is feeling better this afternoon and tolerating clear liquids without nausea. Abdominal pain remains stable and he continues to pass flatus and stool. Unknown etiology of elevated temp however would likely attribute this to pancreatitis as he is otherwise improving in labs and clinically. Will continue monitoring, repeat labs in am and likely diet advancement then.    PLAN:  -Okay for clear liquid diet slowly at first  -Ambulating to bathroom and in halls as able  -Multimodal pain mgmt avoiding narcotics as able and PO > IV  -continue bowel regimen for now, hold for loose stools  -Continue with IV fluids at least until intake sufficient, likely through today  -Medical management per primary team    SUBJECTIVE:   Blue Davey was seen on rounds. States he is feeling okay but his belly is hurting of hunger pangs and he \"feels so hungry like he could pass away\" denies nausea or vomiting. Stomach is less distended than yesterday. Unsure if better or worse. Pain is less, mostly around incisions now instead of inside. Is having bowel movements and passing flatus now. Less dizzy when he is getting out of bed " than yesterday. Did feel feverish last night currently does not.     VITALS RANGE:  Temp:  [99.1  F (37.3  C)-101.6  F (38.7  C)] 99.1  F (37.3  C)  Pulse:  [105-128] 110  Resp:  [16-22] 18  BP: (161-205)/() 192/91  SpO2:  [92 %-93 %] 93 %    PHYSICAL EXAM: [employer] professional  was used via phone  General: patient seen resting in bed in no acute distress thought does moan at times  Resp: no increased work of breathing, breathing comfortably on Room air  Abdomen: Softly distended abdomen less taught and less distended than yesterday some tenderness with palpation near incisions and epigastric area without peritoneal signs or guarding on exam. Small bump along the right abdomen (appears as a small round ball almost cyst like or dermatofibroma)    Extremities: No edema or cyanosis visualized on exam, no obvious deformities    No intake/output data recorded.    Admission on 03/10/2025   Component Date Value    Sodium 03/10/2025 139     Potassium 03/10/2025 4.0     Carbon Dioxide (CO2) 03/10/2025 23     Anion Gap 03/10/2025 10     Urea Nitrogen 03/10/2025 13.4     Creatinine 03/10/2025 1.08     GFR Estimate 03/10/2025 81     Calcium 03/10/2025 9.3     Chloride 03/10/2025 106     Glucose 03/10/2025 107 (H)     Alkaline Phosphatase 03/10/2025 250 (H)     AST 03/10/2025 200 (H)     ALT 03/10/2025 445 (H)     Protein Total 03/10/2025 7.0     Albumin 03/10/2025 4.0     Bilirubin Total 03/10/2025 4.1 (H)     Lipase 03/10/2025 1,660 (H)     Troponin T, High Sensiti* 03/10/2025 16     WBC Count 03/10/2025 12.9 (H)     RBC Count 03/10/2025 5.62     Hemoglobin 03/10/2025 15.7     Hematocrit 03/10/2025 50.0     MCV 03/10/2025 89     MCH 03/10/2025 27.9     MCHC 03/10/2025 31.4 (L)     RDW 03/10/2025 13.3     Platelet Count 03/10/2025 219     % Neutrophils 03/10/2025 88     % Lymphocytes 03/10/2025 8     % Monocytes 03/10/2025 4     % Eosinophils 03/10/2025 0     % Basophils 03/10/2025 0     % Immature Granulocytes  03/10/2025 0     NRBCs per 100 WBC 03/10/2025 0     Absolute Neutrophils 03/10/2025 11.3 (H)     Absolute Lymphocytes 03/10/2025 1.0     Absolute Monocytes 03/10/2025 0.6     Absolute Eosinophils 03/10/2025 0.0     Absolute Basophils 03/10/2025 0.0     Absolute Immature Granul* 03/10/2025 0.1     Absolute NRBCs 03/10/2025 0.0     Troponin T, High Sensiti* 03/10/2025 14     Magnesium 03/10/2025 2.2     Phosphorus 03/10/2025 2.5     Estimated Average Glucose 03/10/2025 100     Hemoglobin A1C 03/10/2025 5.1     GLUCOSE BY METER POCT 03/10/2025 138 (H)     Sodium 03/11/2025 137     Potassium 03/11/2025 3.9     Carbon Dioxide (CO2) 03/11/2025 24     Anion Gap 03/11/2025 6 (L)     Urea Nitrogen 03/11/2025 15.9     Creatinine 03/11/2025 0.99     GFR Estimate 03/11/2025 90     Calcium 03/11/2025 8.5 (L)     Chloride 03/11/2025 107     Glucose 03/11/2025 112 (H)     Alkaline Phosphatase 03/11/2025 215 (H)     AST 03/11/2025 80 (H)     ALT 03/11/2025 328 (H)     Protein Total 03/11/2025 6.0 (L)     Albumin 03/11/2025 3.5     Bilirubin Total 03/11/2025 1.7 (H)     WBC Count 03/11/2025 14.3 (H)     RBC Count 03/11/2025 5.03     Hemoglobin 03/11/2025 14.3     Hematocrit 03/11/2025 44.4     MCV 03/11/2025 88     MCH 03/11/2025 28.4     MCHC 03/11/2025 32.2     RDW 03/11/2025 13.5     Platelet Count 03/11/2025 201     Magnesium 03/11/2025 2.1     Phosphorus 03/11/2025 2.7     Lipase 03/11/2025 415 (H)     Bilirubin Direct 03/11/2025 0.80 (H)     Troponin T, High Sensiti* 03/11/2025 14     Troponin T, High Sensiti* 03/11/2025 17     GLUCOSE BY METER POCT 03/11/2025 85     Case Report 03/11/2025                      Value:Surgical Pathology Report                         Case: [id number]                                  Authorizing Provider:  Lisette Purvis MD         Collected:           03/11/2025 10:04 AM          Ordering Location:     [facility] [place]      Received:            03/11/2025 10:38 AM                                  [facility]                                                               Pathologist:           Jovany Gallegos MD                                                      Specimen:    Gallbladder, GALLBLADDER                                                                   Final Diagnosis 03/11/2025                      Value:GALLBLADDER, LAPAROSCOPIC CHOLECYSTECTOMY:  -ACUTE ON CHRONIC CHOLECYSTITIS WITH ADENOMYOMA      Clinical Information 03/11/2025                      Value:Procedure:  Laparoscopic Cholecystectomy with Intra-operative  CHOLANGIOGRAMS  Pre-op Diagnosis: Acute cholecystitis [K81.0]  Post-op Diagnosis: K81.0 - Acute cholecystitis [ICD-10-CM]      Gross Description 03/11/2025                      Value:A(1). Gallbladder, GALLBLADDER:  Received in formalin, labeled with the patient's name and gallbladder is a focally disrupted 4.2 x 2.6 x 1.7 cm gallbladder with attached 0.5 cm in diameter stump of cystic duct.  The serosa is tan-pink to red, mottled and congested.  There is a fundic located submucosal 1.0 x 0.6 x 0.4 cm area of ill-defined nodularity.  Sectioning through this area displays minute to 0.2 cm, smooth lined sinus/cystic cavities.  No excrescences are identified.  The gallbladder wall ranges from 0.1 cm to 0.3 cm in thickness.  No choleliths are identified within the lumen or the specimen container.  RS-2C   DANIELLE Guzman        Microscopic Description 03/11/2025                      Value:Performed      Performing Labs 03/11/2025                      Value:The technical component of this testing was completed at [facility].    Stain controls for all stains resulted within this report have been reviewed and show appropriate reactivity.       GLUCOSE BY METER POCT 03/11/2025 112 (H)     GLUCOSE BY METER POCT 03/11/2025 143 (H)     Protein Total 03/12/2025 6.6     Albumin 03/12/2025 3.6     Bilirubin Total  03/12/2025 3.3 (H)     Alkaline Phosphatase 03/12/2025 250 (H)     AST 03/12/2025 70 (H)     ALT 03/12/2025 202 (H)     Bilirubin Direct 03/12/2025 2.42 (H)     Lipase 03/12/2025 >3,000 (H)     WBC Count 03/12/2025 20.9 (H)     RBC Count 03/12/2025 5.16     Hemoglobin 03/12/2025 14.6     Hematocrit 03/12/2025 45.9     MCV 03/12/2025 89     MCH 03/12/2025 28.3     MCHC 03/12/2025 31.8     RDW 03/12/2025 14.1     Platelet Count 03/12/2025 214     Sodium 03/12/2025 138     Potassium 03/12/2025 3.8     Chloride 03/12/2025 106     Carbon Dioxide (CO2) 03/12/2025 24     Anion Gap 03/12/2025 8     Urea Nitrogen 03/12/2025 18.2     Creatinine 03/12/2025 1.27 (H)     GFR Estimate 03/12/2025 67     Calcium 03/12/2025 8.9     Glucose 03/12/2025 128 (H)     Lactic Acid, Initial 03/12/2025 1.2     GLUCOSE BY METER POCT 03/12/2025 137 (H)     pH Venous 03/12/2025 7.43     pCO2 Venous 03/12/2025 38 (L)     pO2 Venous 03/12/2025 50 (H)     Bicarbonate Venous 03/12/2025 26     Base Excess/Deficit Veno* 03/12/2025 1.3     FIO2 03/12/2025 92     Oxyhemoglobin Venous 03/12/2025 85 (H)     O2 Sat, Venous 03/12/2025 85.9 (H)     Sodium 03/12/2025 138     Potassium 03/12/2025 3.7     Carbon Dioxide (CO2) 03/12/2025 23     Anion Gap 03/12/2025 8     Urea Nitrogen 03/12/2025 19.2     Creatinine 03/12/2025 1.06     GFR Estimate 03/12/2025 83     Calcium 03/12/2025 8.6 (L)     Chloride 03/12/2025 107     Glucose 03/12/2025 107 (H)     Alkaline Phosphatase 03/12/2025 225 (H)     AST 03/12/2025 56 (H)     ALT 03/12/2025 155 (H)     Protein Total 03/12/2025 5.8 (L)     Albumin 03/12/2025 3.3 (L)     Bilirubin Total 03/12/2025 4.0 (H)     WBC Count 03/12/2025 18.2 (H)     RBC Count 03/12/2025 4.73     Hemoglobin 03/12/2025 13.3     Hematocrit 03/12/2025 41.6     MCV 03/12/2025 88     MCH 03/12/2025 28.1     MCHC 03/12/2025 32.0     RDW 03/12/2025 14.2     Platelet Count 03/12/2025 177     Lactic Acid 03/12/2025 1.1     GLUCOSE BY METER POCT  03/12/2025 100 (H)     Hold Specimen 03/12/2025 JIC     Hold Specimen 03/12/2025 JIC     GLUCOSE BY METER POCT 03/12/2025 91     WBC Count 03/13/2025 18.7 (H)     RBC Count 03/13/2025 4.29 (L)     Hemoglobin 03/13/2025 12.5 (L)     Hematocrit 03/13/2025 38.1 (L)     MCV 03/13/2025 89     MCH 03/13/2025 29.1     MCHC 03/13/2025 32.8     RDW 03/13/2025 14.5     Platelet Count 03/13/2025 164     Sodium 03/13/2025 138     Potassium 03/13/2025 3.7     Carbon Dioxide (CO2) 03/13/2025 22     Anion Gap 03/13/2025 9     Urea Nitrogen 03/13/2025 18.4     Creatinine 03/13/2025 0.97     GFR Estimate 03/13/2025 >90     Calcium 03/13/2025 8.4 (L)     Chloride 03/13/2025 107     Glucose 03/13/2025 88     Alkaline Phosphatase 03/13/2025 186 (H)     AST 03/13/2025 42     ALT 03/13/2025 124 (H)     Protein Total 03/13/2025 5.7 (L)     Albumin 03/13/2025 3.0 (L)     Bilirubin Total 03/13/2025 2.2 (H)     Lipase 03/13/2025 145 (H)     GLUCOSE BY METER POCT 03/13/2025 84         Selena Otto PA-C  [facility]  [id number] [place] [address] [phone]

## 2025-03-13 NOTE — PLAN OF CARE
Problem: Pain Acute  Goal: Optimal Pain Control and Function  Outcome: Not Progressing  Intervention: Develop Pain Management Plan  Recent Flowsheet Documentation  Taken 3/13/2025 0346 by Mohan Falcon RN  Pain Management Interventions:   rest   repositioned   relaxation techniques promoted  Taken 3/12/2025 2339 by Mohan Falcon RN  Pain Management Interventions:   rest   repositioned   relaxation techniques promoted  Taken 3/12/2025 1936 by Mohan Falcon RN  Pain Management Interventions:   rest   repositioned   relaxation techniques promoted     Problem: Surgery Nonspecified  Goal: Effective Bowel Elimination  Outcome: Not Progressing  Goal: Absence of Infection Signs and Symptoms  Outcome: Not Progressing  Goal: Optimal Pain Control and Function  Outcome: Not Progressing  Intervention: Prevent or Manage Pain  Recent Flowsheet Documentation  Taken 3/13/2025 0346 by Mohan Falcon RN  Pain Management Interventions:   rest   repositioned   relaxation techniques promoted  Taken 3/12/2025 2339 by Mohan Falcon RN  Pain Management Interventions:   rest   repositioned   relaxation techniques promoted  Taken 3/12/2025 1936 by Mohan Falcon RN  Pain Management Interventions:   rest   repositioned   relaxation techniques promoted   Goal Outcome Evaluation:pt remains alert and oriented. Pt continues to c/o abd pain but declined prn today. Up to the bathroom with SBA and walker and gait belt. Pt c/o nausea once during shift. Given prn with relief. Pt developed fever during shift. Given prn with relief. IV LR infusing at 100 ml/hr. Pt made multiple trips to the bathroom.       Plan of Care Reviewed With: patient    Overall Patient Progress: no changeOverall Patient Progress: no change    Outcome Evaluation: pt continues to c/o abd pain

## 2025-03-13 NOTE — PLAN OF CARE
Goal Outcome Evaluation:      Plan of Care Reviewed With: patient    Overall Patient Progress: declining    Patient alert and oriented x4. Patient required 3 nitroglycerin today and maalox for chest pain. Pain is better not completely resolved. Blood pressure is improved. Patient has IV abx running at present. Chest x ray was normal. Lap sites look clean, dry, and intact. Patient is stand by assist for mobility.  Patient is on 1 liter NC. Patient has new tele orders.  Patient will be moving to Room 114.  Report given to MARGAUX Owusu.  Questions were encouraged and answered.

## 2025-03-13 NOTE — PROGRESS NOTES
"Pt reporting severe chest pain of \"9/10\". /108. Tachy into the 120s. Pt sitting on edge of bed and reports \"shortness of breath\". 2L oxygen applied for comfort. O2 sats WDL. Sublingual nitroglycerin given x1 and effective per patient. MD updated. EKG and troponin pending.   "

## 2025-03-13 NOTE — DISCHARGE INSTRUCTIONS
From your General Surgery Team:  You were seen by [facility] general surgery.  If you do not have an appointment and would like to schedule a follow up appointment with general surgery in clinic, please call us at [phone] to schedule an appointment at your convenience.      PAIN:  Some pain is expected after surgery. This will improve over time. After laparoscopic surgery, some people experience shoulder pain on the first day after surgery. This is from the gas used to inflate the abdomen during the surgery. This sensation usually improves within 48 hours. I recommend using Tylenol 1000 mg every 6 hours and ibuprofen 400-600 mg every 6 hours for your primary pain control. Alternate between the two medications, taking one every three hours. Example schedule below.    9 AM  - Tylenol 1000 mg   12 PM - Ibuprofen 400-600 mg  3 PM - Tylenol 1000 mg   6 PM - Ibuprofen 400-600 mg    Take these medications scheduled for 3 days. Do not exceed the maximum dosage amount over 24 hours as recommended on the medication bottle.   Take the prescribed narcotic medications only if needed in addition to medications above.     Narcotic medications can cause constipation. If you are struggling with constipation, we recommend taking Miralax once daily or stool softener combo (such as Dulcolax or Senna) to help easily pass stool. Do not take these medications if you are having diarrhea or are sensitive / allergic to them.

## 2025-03-14 ENCOUNTER — APPOINTMENT (OUTPATIENT)
Dept: PHYSICAL THERAPY | Facility: HOSPITAL | Age: 55
DRG: 417 | End: 2025-03-14

## 2025-03-14 LAB
ALBUMIN SERPL BCG-MCNC: 2.8 G/DL (ref 3.5–5.2)
ALP SERPL-CCNC: 150 U/L (ref 40–150)
ALT SERPL W P-5'-P-CCNC: 96 U/L (ref 0–70)
ANION GAP SERPL CALCULATED.3IONS-SCNC: 9 MMOL/L (ref 7–15)
AST SERPL W P-5'-P-CCNC: 26 U/L (ref 0–45)
ATRIAL RATE - MUSE: 104 BPM
ATRIAL RATE - MUSE: 114 BPM
ATRIAL RATE - MUSE: 99 BPM
BILIRUB DIRECT SERPL-MCNC: 0.74 MG/DL (ref 0–0.3)
BILIRUB SERPL-MCNC: 1.3 MG/DL
BUN SERPL-MCNC: 18.3 MG/DL (ref 6–20)
CALCIUM SERPL-MCNC: 8.5 MG/DL (ref 8.8–10.4)
CHLORIDE SERPL-SCNC: 104 MMOL/L (ref 98–107)
CREAT SERPL-MCNC: 1.02 MG/DL (ref 0.67–1.17)
DIASTOLIC BLOOD PRESSURE - MUSE: NORMAL MMHG
EGFRCR SERPLBLD CKD-EPI 2021: 87 ML/MIN/1.73M2
ERYTHROCYTE [DISTWIDTH] IN BLOOD BY AUTOMATED COUNT: 14.3 % (ref 10–15)
GLUCOSE BLDC GLUCOMTR-MCNC: 106 MG/DL (ref 70–99)
GLUCOSE BLDC GLUCOMTR-MCNC: 93 MG/DL (ref 70–99)
GLUCOSE SERPL-MCNC: 114 MG/DL (ref 70–99)
HCO3 SERPL-SCNC: 22 MMOL/L (ref 22–29)
HCT VFR BLD AUTO: 35.5 % (ref 40–53)
HGB BLD-MCNC: 11.8 G/DL (ref 13.3–17.7)
HOLD SPECIMEN: NORMAL
INTERPRETATION ECG - MUSE: NORMAL
LIPASE SERPL-CCNC: 56 U/L (ref 13–60)
MCH RBC QN AUTO: 29.1 PG (ref 26.5–33)
MCHC RBC AUTO-ENTMCNC: 33.2 G/DL (ref 31.5–36.5)
MCV RBC AUTO: 88 FL (ref 78–100)
P AXIS - MUSE: 51 DEGREES
P AXIS - MUSE: 54 DEGREES
P AXIS - MUSE: 59 DEGREES
PLATELET # BLD AUTO: 191 10E3/UL (ref 150–450)
POTASSIUM SERPL-SCNC: 3.6 MMOL/L (ref 3.4–5.3)
PR INTERVAL - MUSE: 124 MS
PR INTERVAL - MUSE: 128 MS
PR INTERVAL - MUSE: 130 MS
PROT SERPL-MCNC: 5.9 G/DL (ref 6.4–8.3)
QRS DURATION - MUSE: 100 MS
QRS DURATION - MUSE: 102 MS
QRS DURATION - MUSE: 108 MS
QT - MUSE: 336 MS
QT - MUSE: 342 MS
QT - MUSE: 358 MS
QTC - MUSE: 438 MS
QTC - MUSE: 463 MS
QTC - MUSE: 470 MS
R AXIS - MUSE: 41 DEGREES
R AXIS - MUSE: 48 DEGREES
R AXIS - MUSE: 89 DEGREES
RBC # BLD AUTO: 4.05 10E6/UL (ref 4.4–5.9)
SODIUM SERPL-SCNC: 135 MMOL/L (ref 135–145)
SYSTOLIC BLOOD PRESSURE - MUSE: NORMAL MMHG
T AXIS - MUSE: -17 DEGREES
T AXIS - MUSE: -22 DEGREES
T AXIS - MUSE: -23 DEGREES
TROPONIN T SERPL HS-MCNC: 19 NG/L
TROPONIN T SERPL HS-MCNC: 22 NG/L
VENTRICULAR RATE- MUSE: 104 BPM
VENTRICULAR RATE- MUSE: 114 BPM
VENTRICULAR RATE- MUSE: 99 BPM
WBC # BLD AUTO: 17.4 10E3/UL (ref 4–11)

## 2025-03-14 PROCEDURE — 82435 ASSAY OF BLOOD CHLORIDE: CPT

## 2025-03-14 PROCEDURE — 250N000011 HC RX IP 250 OP 636: Performed by: STUDENT IN AN ORGANIZED HEALTH CARE EDUCATION/TRAINING PROGRAM

## 2025-03-14 PROCEDURE — 250N000011 HC RX IP 250 OP 636

## 2025-03-14 PROCEDURE — 250N000013 HC RX MED GY IP 250 OP 250 PS 637

## 2025-03-14 PROCEDURE — 97530 THERAPEUTIC ACTIVITIES: CPT | Mod: GP

## 2025-03-14 PROCEDURE — 82248 BILIRUBIN DIRECT: CPT

## 2025-03-14 PROCEDURE — 85041 AUTOMATED RBC COUNT: CPT

## 2025-03-14 PROCEDURE — 36415 COLL VENOUS BLD VENIPUNCTURE: CPT

## 2025-03-14 PROCEDURE — 250N000013 HC RX MED GY IP 250 OP 250 PS 637: Performed by: INTERNAL MEDICINE

## 2025-03-14 PROCEDURE — 85014 HEMATOCRIT: CPT

## 2025-03-14 PROCEDURE — 120N000001 HC R&B MED SURG/OB

## 2025-03-14 PROCEDURE — 250N000013 HC RX MED GY IP 250 OP 250 PS 637: Performed by: HOSPITALIST

## 2025-03-14 PROCEDURE — 93005 ELECTROCARDIOGRAM TRACING: CPT

## 2025-03-14 PROCEDURE — 258N000003 HC RX IP 258 OP 636

## 2025-03-14 PROCEDURE — 250N000011 HC RX IP 250 OP 636: Performed by: SPECIALIST

## 2025-03-14 PROCEDURE — 83690 ASSAY OF LIPASE: CPT

## 2025-03-14 PROCEDURE — 250N000013 HC RX MED GY IP 250 OP 250 PS 637: Performed by: SPECIALIST

## 2025-03-14 PROCEDURE — 80053 COMPREHEN METABOLIC PANEL: CPT

## 2025-03-14 PROCEDURE — 84484 ASSAY OF TROPONIN QUANT: CPT

## 2025-03-14 RX ORDER — IBUPROFEN 200 MG
200-400 TABLET ORAL EVERY 6 HOURS PRN
COMMUNITY
Start: 2025-03-14 | End: 2025-03-17

## 2025-03-14 RX ORDER — SENNOSIDES 8.6 MG
2 TABLET ORAL DAILY
Status: DISCONTINUED | OUTPATIENT
Start: 2025-03-14 | End: 2025-03-17 | Stop reason: HOSPADM

## 2025-03-14 RX ORDER — ACETAMINOPHEN 325 MG/1
325-650 TABLET ORAL EVERY 6 HOURS PRN
COMMUNITY
Start: 2025-03-14 | End: 2025-03-17

## 2025-03-14 RX ORDER — KETOROLAC TROMETHAMINE 30 MG/ML
30 INJECTION, SOLUTION INTRAMUSCULAR; INTRAVENOUS ONCE
Status: COMPLETED | OUTPATIENT
Start: 2025-03-14 | End: 2025-03-14

## 2025-03-14 RX ORDER — AMLODIPINE BESYLATE 5 MG/1
5 TABLET ORAL DAILY
Status: DISCONTINUED | OUTPATIENT
Start: 2025-03-14 | End: 2025-03-15

## 2025-03-14 RX ADMIN — PANTOPRAZOLE SODIUM 40 MG: 40 INJECTION, POWDER, FOR SOLUTION INTRAVENOUS at 08:14

## 2025-03-14 RX ADMIN — ALUMINUM HYDROXIDE, MAGNESIUM HYDROXIDE, AND SIMETHICONE 30 ML: 200; 200; 20 SUSPENSION ORAL at 16:33

## 2025-03-14 RX ADMIN — HYDRALAZINE HYDROCHLORIDE 10 MG: 20 INJECTION INTRAMUSCULAR; INTRAVENOUS at 16:09

## 2025-03-14 RX ADMIN — HYDRALAZINE HYDROCHLORIDE 10 MG: 20 INJECTION INTRAMUSCULAR; INTRAVENOUS at 02:36

## 2025-03-14 RX ADMIN — POLYETHYLENE GLYCOL 3350 17 G: 17 POWDER, FOR SOLUTION ORAL at 08:14

## 2025-03-14 RX ADMIN — KETOROLAC TROMETHAMINE 30 MG: 30 INJECTION, SOLUTION INTRAMUSCULAR at 03:10

## 2025-03-14 RX ADMIN — ACETAMINOPHEN 650 MG: 325 TABLET, FILM COATED ORAL at 20:43

## 2025-03-14 RX ADMIN — PIPERACILLIN AND TAZOBACTAM 3.38 G: 3; .375 INJECTION, POWDER, FOR SOLUTION INTRAVENOUS at 15:55

## 2025-03-14 RX ADMIN — OXYCODONE HYDROCHLORIDE 2.5 MG: 5 TABLET ORAL at 20:43

## 2025-03-14 RX ADMIN — FAMOTIDINE 20 MG: 10 INJECTION, SOLUTION INTRAVENOUS at 15:56

## 2025-03-14 RX ADMIN — ALUMINUM HYDROXIDE, MAGNESIUM HYDROXIDE, AND SIMETHICONE 30 ML: 200; 200; 20 SUSPENSION ORAL at 20:44

## 2025-03-14 RX ADMIN — ACETAMINOPHEN 650 MG: 325 TABLET, FILM COATED ORAL at 12:24

## 2025-03-14 RX ADMIN — AMLODIPINE BESYLATE 5 MG: 5 TABLET ORAL at 12:25

## 2025-03-14 RX ADMIN — SODIUM CHLORIDE, SODIUM LACTATE, POTASSIUM CHLORIDE, AND CALCIUM CHLORIDE: .6; .31; .03; .02 INJECTION, SOLUTION INTRAVENOUS at 03:56

## 2025-03-14 RX ADMIN — PIPERACILLIN AND TAZOBACTAM 3.38 G: 3; .375 INJECTION, POWDER, FOR SOLUTION INTRAVENOUS at 08:14

## 2025-03-14 RX ADMIN — NITROGLYCERIN 0.4 MG: 0.4 TABLET SUBLINGUAL at 08:18

## 2025-03-14 RX ADMIN — ALUMINUM HYDROXIDE, MAGNESIUM HYDROXIDE, AND SIMETHICONE 30 ML: 200; 200; 20 SUSPENSION ORAL at 12:26

## 2025-03-14 RX ADMIN — ONDANSETRON 4 MG: 2 INJECTION, SOLUTION INTRAMUSCULAR; INTRAVENOUS at 14:10

## 2025-03-14 RX ADMIN — ALUMINUM HYDROXIDE, MAGNESIUM HYDROXIDE, AND SIMETHICONE 30 ML: 200; 200; 20 SUSPENSION ORAL at 08:14

## 2025-03-14 RX ADMIN — NITROGLYCERIN 0.4 MG: 0.4 TABLET SUBLINGUAL at 02:36

## 2025-03-14 RX ADMIN — PIPERACILLIN AND TAZOBACTAM 3.38 G: 3; .375 INJECTION, POWDER, FOR SOLUTION INTRAVENOUS at 00:06

## 2025-03-14 RX ADMIN — HYDROMORPHONE HYDROCHLORIDE 1 MG: 1 INJECTION, SOLUTION INTRAMUSCULAR; INTRAVENOUS; SUBCUTANEOUS at 02:36

## 2025-03-14 RX ADMIN — SENNOSIDES AND DOCUSATE SODIUM 1 TABLET: 50; 8.6 TABLET ORAL at 00:07

## 2025-03-14 RX ADMIN — ACETAMINOPHEN 650 MG: 325 TABLET, FILM COATED ORAL at 16:33

## 2025-03-14 RX ADMIN — NITROGLYCERIN 0.4 MG: 0.4 TABLET SUBLINGUAL at 00:56

## 2025-03-14 RX ADMIN — HYDRALAZINE HYDROCHLORIDE 10 MG: 20 INJECTION INTRAMUSCULAR; INTRAVENOUS at 23:41

## 2025-03-14 RX ADMIN — OXYCODONE HYDROCHLORIDE 5 MG: 5 TABLET ORAL at 12:24

## 2025-03-14 RX ADMIN — SENNOSIDES 2 TABLET: 8.6 TABLET ORAL at 14:43

## 2025-03-14 RX ADMIN — METOPROLOL SUCCINATE 50 MG: 50 TABLET, EXTENDED RELEASE ORAL at 08:14

## 2025-03-14 RX ADMIN — ALUMINUM HYDROXIDE, MAGNESIUM HYDROXIDE, AND SIMETHICONE 30 ML: 200; 200; 20 SUSPENSION ORAL at 03:10

## 2025-03-14 RX ADMIN — OXYCODONE HYDROCHLORIDE 5 MG: 5 TABLET ORAL at 16:33

## 2025-03-14 RX ADMIN — HYDRALAZINE HYDROCHLORIDE 10 MG: 20 INJECTION INTRAMUSCULAR; INTRAVENOUS at 09:54

## 2025-03-14 RX ADMIN — PIPERACILLIN AND TAZOBACTAM 3.38 G: 3; .375 INJECTION, POWDER, FOR SOLUTION INTRAVENOUS at 23:41

## 2025-03-14 ASSESSMENT — ACTIVITIES OF DAILY LIVING (ADL)
ADLS_ACUITY_SCORE: 36
ADLS_ACUITY_SCORE: 31
ADLS_ACUITY_SCORE: 36
ADLS_ACUITY_SCORE: 30
ADLS_ACUITY_SCORE: 31
ADLS_ACUITY_SCORE: 31
ADLS_ACUITY_SCORE: 36
ADLS_ACUITY_SCORE: 36
ADLS_ACUITY_SCORE: 31
ADLS_ACUITY_SCORE: 36
ADLS_ACUITY_SCORE: 31
ADLS_ACUITY_SCORE: 31
ADLS_ACUITY_SCORE: 36
ADLS_ACUITY_SCORE: 36
ADLS_ACUITY_SCORE: 31
ADLS_ACUITY_SCORE: 32

## 2025-03-14 NOTE — PROGRESS NOTES
AO, VSS on RA. Connectivity  utilized during cares. Endorses chest pain- PRN nitroglycerin given @0818 (alongside PRN maalox per pt request) with some resolution of symptoms. BP increased to 193/101 @0955 following PT- PRN hydralazine given, BP 30min later now 176/94. NSR observed on tele. Up with SBA + walker to bathroom, 1x BM this AM. Continuous IVF, LR @50mL/hr. IV Abx continued this shift. Diet upgraded to low fat, tolerating. Family now at bedside.     Lawson Mazariegos RN

## 2025-03-14 NOTE — PROGRESS NOTES
"[facility]    Medicine Progress Note - Hospitalist Service    Date of Admission:  3/10/2025    Assessment & Plan   55 male with no prior medical history admitted 3/10/2025 with belly pain and abnormal LFTs and found to have acute cholecystitis.  Patient underwent cholecystectomy 3/11/2025 without complication.  Postop course complicated by fevers and persistent abdominal symptoms related to pancreatitis.    #Acute cholecystitis  #Acute pancreatitis  #Abnormal LFTs  -Presented with typical symptoms and imaging findings  -MRCP showing mild CBD dilation 7 mm, no choledocholithiasis  -Underwent lap ally 3/11, IOC benign  -Advancing diet as tolerated, abdominal symptoms seem to be flaring 3/14, will make n.p.o. again and monitor for further improvement  -Continue antibiotics pending blood culture results  -Supportive measures with pain and nausea meds and IV fluids    #Noncardiac chest pain  -Intermittent postoperatively  -EKG and troponins benign, monitor clinically  -Consider outpatient stress test    #POLLY  -Resolved, likely from pancreatitis and prerenal azotemia  -Monitor with daily labs, support with fluids    #Elevated blood pressure  -No pre-existing hypertension, start Norvasc 3/14    #Leukocytosis  -Infectious and inflammatory, continues to be elevated, trend daily          Diet: NPO for Medical/Clinical Reasons Except for: Meds, Ice Chips    DVT Prophylaxis: Pneumatic Compression Devices  Hercules Catheter: Not present  Lines: None     Cardiac Monitoring: ACTIVE order. Indication: Tachyarrhythmias, acute (48 hours)  Code Status: Full Code      Clinically Significant Risk Factors               # Hypoalbuminemia: Lowest albumin = 2.8 g/dL at 3/14/2025  2:59 AM, will monitor as appropriate                # Overweight: Estimated body mass index is 26.78 kg/m  as calculated from the following:    Height as of this encounter: 1.575 m (5' 2\").    Weight as of this encounter: 66.4 kg (146 lb 6.4 " oz)., PRESENT ON ADMISSION            Social Drivers of Health            Disposition Plan     Medically Ready for Discharge: Anticipated in 2-4 Days             Mitul Plaza MD  Hospitalist Service  [facility]  Securely message with [employer] (more info)  Text page via [employer] Paging/Directory   ______________________________________________________________________    Interval History   Having increased pain and nausea today, feels feverish.    Physical Exam   Vital Signs: Temp: 98.3  F (36.8  C) Temp src: Oral BP: (!) 199/90 (rn notified) Pulse: 88   Resp: 18 SpO2: 95 % O2 Device: None (Room air) Oxygen Delivery: 2 LPM  Weight: 146 lbs 6.4 oz    Tired, mild distress, uncomfortable appearing, heart rate regular, lungs with bibasilar crackles, abdomen tender, no rebound, legs with trace edema, anxious    Medical Decision Making       55 MINUTES SPENT BY ME on the date of service doing chart review, history, exam, documentation & further activities per the note.  NOTE(S)/MEDICAL RECORDS REVIEWED over the past 24 hours: Vitals, labs, new imaging, documentation and medication administrations       Data     I have personally reviewed the following data over the past 24 hrs:    17.4 (H)  \   11.8 (L)   / 191     135 104 18.3 /  93   3.6 22 1.02 \     ALT: 96 (H) AST: 26 AP: 150 TBILI: 1.3 (H)   ALB: 2.8 (L) TOT PROTEIN: 5.9 (L) LIPASE: 56     Trop: 19 BNP: N/A       Imaging results reviewed over the past 24 hrs:   No results found for this or any previous visit (from the past 24 hours).

## 2025-03-14 NOTE — PLAN OF CARE
"  Problem: Pain Acute  Goal: Optimal Pain Control and Function  Outcome: Progressing  Intervention: Develop Pain Management Plan  Recent Flowsheet Documentation  Taken 3/14/2025 0000 by Pat Tian RN  Pain Management Interventions:   pain management plan reviewed with patient/caregiver   pillow support provided   repositioned  Intervention: Prevent or Manage Pain  Recent Flowsheet Documentation  Taken 3/14/2025 0000 by Pat Tian RN  Medication Review/Management: medications reviewed     Problem: Hypertension Acute  Goal: Blood Pressure Within Desired Range  Outcome: Progressing  Intervention: Normalize Blood Pressure  Recent Flowsheet Documentation  Taken 3/14/2025 0000 by Pat Tian RN  Medication Review/Management: medications reviewed   Goal Outcome Evaluation:    Hmong speaking individual. A&O x4. RA. Reported chest pain at the beginning of shift to which worsened as the night progressed. See previous note. After interventions patient appears to be resting well. Tolerating full liquid diet well. Tele = NSR during this time. LR @ 100cc/hr. No new changes noted during this time, care plan is ongoing.    BP (!) 140/67 (BP Location: Right arm)   Pulse 88   Temp 98.3  F (36.8  C) (Oral)   Resp 20   Ht 1.575 m (5' 2\")   Wt 66.4 kg (146 lb 6.4 oz)   SpO2 93%   BMI 26.78 kg/m          "

## 2025-03-14 NOTE — PROGRESS NOTES
Transferred to P1 room 114 at 2015 PM   Reason for transfer: In patient status  Report given to: Floor RN  Family Notified: Pt stated he will call wife.   Detailed list of belongings sent: cell phone, cell phone , shoes, and shorts. `

## 2025-03-14 NOTE — PROGRESS NOTES
General Surgery Progress Note:    Hospital Day # 4    ASSESSMENT:  1. Generalized abdominal pain    2. Chest pain, unspecified type    3. Acute cholecystitis    4. Elevated LFTs        Blue Davey is a 55 year old male who is s/p laparoscopic cholecystectomy with negative IOC on 3/11/25. Postoperative course complicated by tachycardia (resolved today) and elevation in T.bili (4>2.2>1.3) and alk phos, pancreatitis now resolving (lipase 56) and ileus picture now resolved. Last documented fever 3/13 attribute this to inflammatory picture as CXR and UA clear. Leukocytosis slowly downtrending 18.7>17.4. Overnight complaints of new chest pain with negative troponins and repeat EKGs chest pain has lessened this morning. From a surgical standpoint labs are downtrending and tolerating diet, having bowel functions would expect leukocytosis to resolve over time. From surgical standpoint would be okay to discharge.    ADDENDUM 1400: Patient experienced increased pain after apple juice this morning, was placed on NPO. Per sister and wife would like only warm liquids for the patient. Confirmed with nursing that patient has experienced back pain (mostly low back) and upper abdominal pain; she clarified the areas of pain make it feel like chest pains but mostly back and abdomen. He appears more comfortable s/p tylenol and oxycodone and is resting currently. He had a small amount of nausea s/p oxy.   -Okay for sips of water, this evening if pain better controlled can trial clear liquids with low fat / lower sugar (family prefers warm liquids); potential advancement tomorrow to fulls vs low fat diet but would intake small amounts at a time to start.  -If unable to advance diet tomorrow may consider repeat imaging    PLAN:  -Low fat, low fiber diet as tolerated today  -Activity as tolerated encourage ambulation as able  -Reduced IV fluids can likely stop these today if continues to tolerate diet  -From a surgical standpoint patient is  okay for discharge if deemed medically appropriate  -Discharge recommendations involve instructions placed in AVS.  -Follow up as needed in clinic  -Medical management per primary team    SUBJECTIVE:   Blue Davey was seen on rounds. States he did have some chest pain overnight which was new in nature, did have back pain as well which has been happening since admission. Did have an episode of chills and sweating overnight now resolved. Tolerating liquid diet. Having bowel functions, small, and passing flatus abdomen not re-distended. No nausea or vomiting. Pain is controlled no new abdominal pain.     VITALS RANGE:  Temp:  [98.2  F (36.8  C)-99.6  F (37.6  C)] 98.2  F (36.8  C)  Pulse:  [] 73  Resp:  [18-20] 19  BP: (140-193)/() 171/100  SpO2:  [93 %-98 %] 95 %    PHYSICAL EXAM:  General: patient seen resting in bed in no acute distress appears with less moaning than yesterday  Resp: no increased work of breathing, breathing comfortably on room air. Some tenderness with palpation over the subxiphoid region and sternum  Abdomen: generally soft somewhat distended abdomen some tenderness with palpation near the midline / epigastric area without peritoneal signs or guarding on exam. Incisions clean, dry, intact with steri strips in place. Ecchymosis surrounding periumbilical incision. Heart monitor wiring in place.    Extremities: No edema or cyanosis visualized on exam, no obvious deformities    03/13 0700 - 03/14 0659  In: 300 [I.V.:300]  Out: -     No results displayed because visit has over 200 results.           MILAN Barney [facility] General Surgery  [address] [address]  Clinic [phone]

## 2025-03-14 NOTE — PROVIDER NOTIFICATION
Patient c/o worsening midsternal chest pain / tightness that is now radiating to mid back. Denies nausea, vomiting, dizziness and/or headache. Does report numbness and tingling in TIANA UE. PRN nitroglycerin, hydralazine and dilaudid given. Patient as of this moment reported no relief to symptoms. Is able to tolerate fluid intake as of this moment.     Paged house officer, Dr. Davila. Ordered EKG and trops. Administer GI cocktail and toradol. Likely related to operation or high BP.

## 2025-03-14 NOTE — PLAN OF CARE
Patient transferred to P1 from Observation. Belongings placed in room closet. At first the patient rated his chest pain 7/10. Dr. Gondal came to bedside to assess and ordered one time dose Protonix and wanted dilaudid given for pain. Also, ok to give IV metoprolol and then PO metoprolol later. Lastly, Maalox was given to help relieve pain possibly due to his GERD. New IV started in the right hand. LR infusing in left AC.     Problem: Adult Inpatient Plan of Care  Goal: Plan of Care Review  Description: The Plan of Care Review/Shift note should be completed every shift.  The Outcome Evaluation is a brief statement about your assessment that the patient is improving, declining, or no change.  This information will be displayed automatically on your shift  note.  Outcome: Progressing     Problem: Adult Inpatient Plan of Care  Goal: Optimal Comfort and Wellbeing  Outcome: Progressing     Problem: Pain Acute  Goal: Optimal Pain Control and Function  Outcome: Progressing     Problem: Hypertension Acute  Goal: Blood Pressure Within Desired Range  Outcome: Progressing

## 2025-03-14 NOTE — PLAN OF CARE
Problem: Adult Inpatient Plan of Care  Goal: Optimal Comfort and Wellbeing  Outcome: Progressing  Intervention: Monitor Pain and Promote Comfort  Recent Flowsheet Documentation  Taken 3/14/2025 1306 by Chip Tian RN  Pain Management Interventions:   rest   repositioned   quiet environment facilitated   emotional support   medication (see MAR)  Taken 3/14/2025 1224 by Chip Tian RN  Pain Management Interventions: (wife at bedside massaging back for pain relief)   medication (see MAR)   pain management plan reviewed with patient/caregiver   massage provided     Problem: Pain Acute  Goal: Optimal Pain Control and Function  Outcome: Progressing  Intervention: Develop Pain Management Plan  Recent Flowsheet Documentation  Taken 3/14/2025 1306 by Chip Tian RN  Pain Management Interventions:   rest   repositioned   quiet environment facilitated   emotional support   medication (see MAR)  Taken 3/14/2025 1224 by Chip Tian RN  Pain Management Interventions: (wife at bedside massaging back for pain relief)   medication (see MAR)   pain management plan reviewed with patient/caregiver   massage provided     Problem: Hypertension Acute  Goal: Blood Pressure Within Desired Range  Outcome: Progressing     Goal Outcome Evaluation:    Patient alert and oriented x4. SBA GB to bathroom. Makes needs known. VSS, except SBPs in higher 170's. Monitoring patient's pain. Reports 10/10 pain to low back, radiating to abdomen, PRN Oxycodone and Tylenol given, pain relief effective, reassessed, 6/10. PRN zofran given for nausea, no vomiting. Patient had sips of juice for lunch, then NPO except ice chips, pt prefers only warm to hot liquids. Family requested patient only served hot beverages and foods. Family requested for lidocaine to abdomen for pain, surgery suggested cold compress to abdomen or warm pack to low back muscle pain. SW/CM consult ordered per family request. Will continue to monitor.     Chip Tian RN

## 2025-03-14 NOTE — SIGNIFICANT EVENT
Significant Event Note    Time of event: 2:55 AM March 14, 2025    Description of event:  Received page regarding patient having midline chest pain this evening.    Blue Davey is a 55 year old male with no significant past medical history and not currently on any medication presented to the hospital with right upper quadrant pain radiating to the back found to have acute cholecystitis and elevated LFTs..  Patient underwent cholecystectomy on 3/11/2025 without complication.      Although patient is admitted and had a cholecystectomy a few days ago.  He also did have this chest pain yesterday evening that was worked up extensively with EKG and troponins.  EKG showed some questionable ischemic changes on the first EKG but that resolved on the second get EKG along with his improvement in blood pressure.    Plan:  Chest pains  Because patient is having midline chest pain again (had yesterday evening and negative ischemic workup) I think it is worthwhile checking a troponin and EKG however I think this chest pain that radiates to his back is also potentially related to his recent operation and or inflammation.  He also received some benefit from a GI cocktail earlier today.  Patient has received some as needed blood pressure medications already and his blood pressure is also coming down along with some slight improvement in pain  as well.  -EKG and troponin  -GI cocktail  -Toradol now  -Continue with as needed blood pressure meds    Discussed with: bedside nurse    Jus Daivla MD    Addendum  3:25 AM   Troponin came back stable from previous.  EKG nonischemic and almost identical to EKG from last night (some anterior TWI).     -Nursing staff reports that his pain essentially got completely better before he was given anti-gas medication or Toradol but after his blood pressure was improved with medications  -This is a very similar story to last evening and so I do suspect that hypertension has been causing some of his  chest pains over the last 24 hours  -Continue with as needed antihypertensives

## 2025-03-15 ENCOUNTER — VIRTUAL VISIT (OUTPATIENT)
Dept: INTERPRETER SERVICES | Facility: CLINIC | Age: 55
End: 2025-03-15

## 2025-03-15 ENCOUNTER — APPOINTMENT (OUTPATIENT)
Dept: PHYSICAL THERAPY | Facility: HOSPITAL | Age: 55
DRG: 417 | End: 2025-03-15

## 2025-03-15 LAB
ALBUMIN SERPL BCG-MCNC: 3.2 G/DL (ref 3.5–5.2)
ALP SERPL-CCNC: 181 U/L (ref 40–150)
ALT SERPL W P-5'-P-CCNC: 75 U/L (ref 0–70)
ANION GAP SERPL CALCULATED.3IONS-SCNC: 10 MMOL/L (ref 7–15)
AST SERPL W P-5'-P-CCNC: 24 U/L (ref 0–45)
BASOPHILS # BLD AUTO: 0 10E3/UL (ref 0–0.2)
BASOPHILS NFR BLD AUTO: 0 %
BILIRUB SERPL-MCNC: 0.9 MG/DL
BUN SERPL-MCNC: 12.4 MG/DL (ref 6–20)
CALCIUM SERPL-MCNC: 8.9 MG/DL (ref 8.8–10.4)
CHLORIDE SERPL-SCNC: 105 MMOL/L (ref 98–107)
CREAT SERPL-MCNC: 0.97 MG/DL (ref 0.67–1.17)
EGFRCR SERPLBLD CKD-EPI 2021: >90 ML/MIN/1.73M2
EOSINOPHIL # BLD AUTO: 0.5 10E3/UL (ref 0–0.7)
EOSINOPHIL NFR BLD AUTO: 3 %
ERYTHROCYTE [DISTWIDTH] IN BLOOD BY AUTOMATED COUNT: 14.2 % (ref 10–15)
GLUCOSE SERPL-MCNC: 87 MG/DL (ref 70–99)
HCO3 SERPL-SCNC: 23 MMOL/L (ref 22–29)
HCT VFR BLD AUTO: 40.8 % (ref 40–53)
HGB BLD-MCNC: 12.8 G/DL (ref 13.3–17.7)
IMM GRANULOCYTES # BLD: 0.1 10E3/UL
IMM GRANULOCYTES NFR BLD: 1 %
LYMPHOCYTES # BLD AUTO: 1.1 10E3/UL (ref 0.8–5.3)
LYMPHOCYTES NFR BLD AUTO: 7 %
MCH RBC QN AUTO: 27.7 PG (ref 26.5–33)
MCHC RBC AUTO-ENTMCNC: 31.4 G/DL (ref 31.5–36.5)
MCV RBC AUTO: 88 FL (ref 78–100)
MONOCYTES # BLD AUTO: 0.8 10E3/UL (ref 0–1.3)
MONOCYTES NFR BLD AUTO: 6 %
NEUTROPHILS # BLD AUTO: 11.9 10E3/UL (ref 1.6–8.3)
NEUTROPHILS NFR BLD AUTO: 83 %
NRBC # BLD AUTO: 0 10E3/UL
NRBC BLD AUTO-RTO: 0 /100
PLATELET # BLD AUTO: 222 10E3/UL (ref 150–450)
POTASSIUM SERPL-SCNC: 3.6 MMOL/L (ref 3.4–5.3)
PROT SERPL-MCNC: 6.8 G/DL (ref 6.4–8.3)
RBC # BLD AUTO: 4.62 10E6/UL (ref 4.4–5.9)
SODIUM SERPL-SCNC: 138 MMOL/L (ref 135–145)
WBC # BLD AUTO: 14.3 10E3/UL (ref 4–11)

## 2025-03-15 PROCEDURE — 250N000013 HC RX MED GY IP 250 OP 250 PS 637: Performed by: INTERNAL MEDICINE

## 2025-03-15 PROCEDURE — 82310 ASSAY OF CALCIUM: CPT | Performed by: HOSPITALIST

## 2025-03-15 PROCEDURE — 258N000003 HC RX IP 258 OP 636

## 2025-03-15 PROCEDURE — 36415 COLL VENOUS BLD VENIPUNCTURE: CPT | Performed by: HOSPITALIST

## 2025-03-15 PROCEDURE — 250N000011 HC RX IP 250 OP 636: Performed by: SPECIALIST

## 2025-03-15 PROCEDURE — 250N000013 HC RX MED GY IP 250 OP 250 PS 637: Performed by: SPECIALIST

## 2025-03-15 PROCEDURE — 250N000013 HC RX MED GY IP 250 OP 250 PS 637

## 2025-03-15 PROCEDURE — T1013 SIGN LANG/ORAL INTERPRETER: HCPCS | Mod: U4,TEL,95 | Performed by: INTERPRETER

## 2025-03-15 PROCEDURE — 84460 ALANINE AMINO (ALT) (SGPT): CPT | Performed by: HOSPITALIST

## 2025-03-15 PROCEDURE — 97116 GAIT TRAINING THERAPY: CPT | Mod: GP

## 2025-03-15 PROCEDURE — 120N000001 HC R&B MED SURG/OB

## 2025-03-15 PROCEDURE — 250N000013 HC RX MED GY IP 250 OP 250 PS 637: Performed by: HOSPITALIST

## 2025-03-15 PROCEDURE — 85025 COMPLETE CBC W/AUTO DIFF WBC: CPT | Performed by: HOSPITALIST

## 2025-03-15 RX ORDER — AMLODIPINE BESYLATE 5 MG/1
10 TABLET ORAL DAILY
Status: DISCONTINUED | OUTPATIENT
Start: 2025-03-16 | End: 2025-03-17 | Stop reason: HOSPADM

## 2025-03-15 RX ORDER — AMLODIPINE BESYLATE 5 MG/1
5 TABLET ORAL ONCE
Status: COMPLETED | OUTPATIENT
Start: 2025-03-15 | End: 2025-03-15

## 2025-03-15 RX ADMIN — ACETAMINOPHEN 650 MG: 325 TABLET, FILM COATED ORAL at 16:57

## 2025-03-15 RX ADMIN — SODIUM CHLORIDE, SODIUM LACTATE, POTASSIUM CHLORIDE, AND CALCIUM CHLORIDE: .6; .31; .03; .02 INJECTION, SOLUTION INTRAVENOUS at 03:34

## 2025-03-15 RX ADMIN — ALUMINUM HYDROXIDE, MAGNESIUM HYDROXIDE, AND SIMETHICONE 30 ML: 200; 200; 20 SUSPENSION ORAL at 06:10

## 2025-03-15 RX ADMIN — OXYCODONE HYDROCHLORIDE 5 MG: 5 TABLET ORAL at 23:05

## 2025-03-15 RX ADMIN — OXYCODONE HYDROCHLORIDE 5 MG: 5 TABLET ORAL at 01:52

## 2025-03-15 RX ADMIN — ALUMINUM HYDROXIDE, MAGNESIUM HYDROXIDE, AND SIMETHICONE 30 ML: 200; 200; 20 SUSPENSION ORAL at 23:05

## 2025-03-15 RX ADMIN — AMLODIPINE BESYLATE 5 MG: 5 TABLET ORAL at 10:12

## 2025-03-15 RX ADMIN — OXYCODONE HYDROCHLORIDE 5 MG: 5 TABLET ORAL at 06:11

## 2025-03-15 RX ADMIN — OXYCODONE HYDROCHLORIDE 5 MG: 5 TABLET ORAL at 16:57

## 2025-03-15 RX ADMIN — SIMETHICONE 80 MG: 80 TABLET, CHEWABLE ORAL at 02:01

## 2025-03-15 RX ADMIN — PANTOPRAZOLE SODIUM 40 MG: 40 INJECTION, POWDER, FOR SOLUTION INTRAVENOUS at 08:02

## 2025-03-15 RX ADMIN — AMLODIPINE BESYLATE 5 MG: 5 TABLET ORAL at 08:03

## 2025-03-15 RX ADMIN — ALUMINUM HYDROXIDE, MAGNESIUM HYDROXIDE, AND SIMETHICONE 30 ML: 200; 200; 20 SUSPENSION ORAL at 10:16

## 2025-03-15 RX ADMIN — POLYETHYLENE GLYCOL 3350 17 G: 17 POWDER, FOR SOLUTION ORAL at 08:03

## 2025-03-15 RX ADMIN — FAMOTIDINE 20 MG: 10 INJECTION, SOLUTION INTRAVENOUS at 16:44

## 2025-03-15 RX ADMIN — ALUMINUM HYDROXIDE, MAGNESIUM HYDROXIDE, AND SIMETHICONE 30 ML: 200; 200; 20 SUSPENSION ORAL at 16:57

## 2025-03-15 RX ADMIN — SENNOSIDES AND DOCUSATE SODIUM 1 TABLET: 50; 8.6 TABLET ORAL at 17:18

## 2025-03-15 RX ADMIN — ALUMINUM HYDROXIDE, MAGNESIUM HYDROXIDE, AND SIMETHICONE 30 ML: 200; 200; 20 SUSPENSION ORAL at 02:01

## 2025-03-15 RX ADMIN — OXYCODONE HYDROCHLORIDE 5 MG: 5 TABLET ORAL at 10:12

## 2025-03-15 RX ADMIN — ACETAMINOPHEN 650 MG: 325 TABLET, FILM COATED ORAL at 23:05

## 2025-03-15 RX ADMIN — PIPERACILLIN AND TAZOBACTAM 3.38 G: 3; .375 INJECTION, POWDER, FOR SOLUTION INTRAVENOUS at 16:44

## 2025-03-15 RX ADMIN — SENNOSIDES 2 TABLET: 8.6 TABLET ORAL at 08:03

## 2025-03-15 RX ADMIN — ACETAMINOPHEN 650 MG: 325 TABLET, FILM COATED ORAL at 06:11

## 2025-03-15 RX ADMIN — PIPERACILLIN AND TAZOBACTAM 3.38 G: 3; .375 INJECTION, POWDER, FOR SOLUTION INTRAVENOUS at 08:02

## 2025-03-15 RX ADMIN — METOPROLOL SUCCINATE 50 MG: 50 TABLET, EXTENDED RELEASE ORAL at 08:03

## 2025-03-15 ASSESSMENT — ACTIVITIES OF DAILY LIVING (ADL)
ADLS_ACUITY_SCORE: 36
DEPENDENT_IADLS:: INDEPENDENT
ADLS_ACUITY_SCORE: 36

## 2025-03-15 NOTE — PROGRESS NOTES
A&Ox3. SBA GB/W. Ambulates SBA to bathroom. Continent B&B. Makes needs known. SBP managed with scheduled and PRN, see MAR. Last /87 after PRN Hydralazine given x1. PRN Tylenol and Oxycodone 5mg given for pain 8/10 back radiating to lower abdominal, below lap site. PRN Maalox given for abdominal pain and discomfort. Patient appears more comfortable, expresses relief of pain and discomfort. Hot pack offered to low back, refused cold compress to abdomen. Pain reassessment score 5/10, tolerable, patient expressed how happy his pain is finally being managed. Encouraged to get up and walk and sit up in recliner, education provided, pt agreed and compliant. Patient sat in recliner at supper time until HS. Continues on Zosyn every 8 hrs, afebrile. LR @ 100 ml/hr. R PIV patent, infusing. L PIV patent, SL. Will continue to monitor.    Chip Tian, RN

## 2025-03-15 NOTE — PROGRESS NOTES
General Surgery Progress Note:    Hospital Day # 5    ASSESSMENT:  1. Generalized abdominal pain    2. Chest pain, unspecified type    3. Acute cholecystitis    4. Elevated LFTs      Blue Davey is a 55 year old male who is s/p laparoscopic cholecystectomy with negative IOC on 3/11/25. Postop course notable for pancreatitis with normalization of lipase, elevated total bili now normalized today (1.3 to 0.9), and elevated alk phos which did bump up today (150 to 181). Labs also notable for leukocytosis trending down (17.4 to 14.3). Had complaints of chest pain yesterday with benign cardiac workup and states pain is now more in his epigastric region. Was put back to NPO yesterday due to increased pain but now advanced to clear liquid diet this morning with no issues. OK to slowly ADAT and discharge pending pain control and diet tolerance.    PLAN:  - Clear liquid diet, can slowly advance diet as tolerated to low fat/fiber  - Encourage activity and ambulation to promote bowel function  - Pain control  - Medical management per primary team  - General surgery will continue to follow  - Discharge pending tolerance of diet advancement and pain control    SUBJECTIVE:     Patient is Hmong speaking and a phone  was utilized    He is feeling better this morning. Reports no chest pain today. Has epigastric pain which radiates to his sides and back at times but is tolerable with pain medications. Also using a abdominal binder which helps with this pain. Passing gas and had a BM today. Voiding with no issues. Denies fever, chills.    Patient Vitals for the past 24 hrs:   BP Temp Temp src Pulse Resp SpO2   03/15/25 0738 (!) 190/91 99.1  F (37.3  C) Oral 101 21 95 %   03/15/25 0304 (!) 161/77 98.4  F (36.9  C) Oral 103 22 95 %   03/15/25 0154 (!) 162/99 -- -- -- -- --   03/15/25 0146 (!) 180/88 -- -- -- -- --   03/15/25 0000 (!) 171/82 -- -- 90 -- --   03/14/25 2315 (!) 184/104 98.2  F (36.8  C) Oral 97 20 96 %   03/14/25  1642 (!) 164/87 -- -- -- -- 94 %   03/14/25 1515 (!) 188/105 98.3  F (36.8  C) Oral -- 18 94 %   03/14/25 1304 (!) 177/85 98.4  F (36.9  C) Oral 93 18 94 %   03/14/25 1059 (!) 199/90 98.3  F (36.8  C) Oral -- 18 95 %   03/14/25 1024 (!) 176/94 -- -- 88 -- --     PHYSICAL EXAM:  General: patient seen resting at edge of bed, no acute distress  Resp: no respiratory distress, breathing comfortably on RA  Abdomen: soft, mildly distended, upper abdominal TTP worse in epigastric region, no rebound tenderness or involuntary guarding. Incisions clean, dry, intact with steri strips. Ecchymosis surrounding periumbilical incision.  Extremities: warm and well perfused    03/14 0700 - 03/15 0659  In: 2175 [P.O.:610; I.V.:1565]  Out: -     No results displayed because visit has over 200 results.           Laura Gallegos PA-C  [facility] General Surgery  [address]  [place] [address]

## 2025-03-15 NOTE — PROGRESS NOTES
"[facility]    Medicine Progress Note - Hospitalist Service    Date of Admission:  3/10/2025    Assessment & Plan   55 male with no prior medical history admitted 3/10/2025 with belly pain and abnormal LFTs and found to have acute cholecystitis.  Patient underwent cholecystectomy 3/11/2025 without complication.  Postop course complicated by fevers and persistent abdominal symptoms related to pancreatitis.    #Acute cholecystitis  #Acute pancreatitis  #Abnormal LFTs  -Presented with typical symptoms and imaging findings  -MRCP showing mild CBD dilation 7 mm, no choledocholithiasis  -Underwent lap ally 3/11, IOC benign  -Advancing diet as tolerated, abdominal symptoms seem to be flaring 3/14, will make n.p.o. again and monitor for further improvement  -Continue antibiotics pending blood culture results  -Supportive measures with pain and nausea meds and IV fluids    #Noncardiac chest pain  -Intermittent postoperatively  -EKG and troponins benign, monitor clinically  -Consider outpatient stress test    #POLLY  -Resolved, likely from pancreatitis and prerenal azotemia  -Monitor with daily labs, support with fluids    #Elevated blood pressure  -No pre-existing hypertension, start Norvasc 3/14    #Leukocytosis  -Infectious and inflammatory, continues to be elevated, trend daily          Diet: Clear Liquid Diet    DVT Prophylaxis: Pneumatic Compression Devices  Hercules Catheter: Not present  Lines: None     Cardiac Monitoring: ACTIVE order. Indication: Tachyarrhythmias, acute (48 hours)  Code Status: Full Code      Clinically Significant Risk Factors               # Hypoalbuminemia: Lowest albumin = 2.8 g/dL at 3/14/2025  2:59 AM, will monitor as appropriate                # Overweight: Estimated body mass index is 26.78 kg/m  as calculated from the following:    Height as of this encounter: 1.575 m (5' 2\").    Weight as of this encounter: 66.4 kg (146 lb 6.4 oz).             Social Drivers of Health  "           Disposition Plan     Medically Ready for Discharge: Anticipated in 2-4 Days             Yen Rivas MD  Hospitalist Service  [facility]  Securely message with [employer] (more info)  Text page via [employer] Paging/Directory   ______________________________________________________________________    Interval History   No new complaints today. He endorsed mild abdominal pain. Had BM twice earlier this morning. He states he wants to eat. Ordered clear liquid diet     Physical Exam   Vital Signs: Temp: 99.1  F (37.3  C) Temp src: Oral BP: (!) 190/91 Pulse: 101   Resp: 21 SpO2: 95 % O2 Device: None (Room air)    Weight: 146 lbs 6.4 oz    General appearance: Awake, Alert, Cooperative, not in any obvious distress and appears stated age   HEENT: Normocephalic, atraumatic, conjunctiva clear without icterus and ears without discharge  Lungs: Clear to auscultation bilaterally, no wheezing, good air exchange, normal work of breathing  Cardiovascular: Regular Rate and Rythm, normal apical impulse, normal S1 and S2, trace lower extremity edema bilaterally  Abdomen: Ecchymosis around the trochars, diffuse vague tenderness  Skin: Skin color, texture normal and bruising or bleeding. No rashes or lesions over face, neck, arms and legs, turgor normal.  Musculoskeletal: No bony deformities or joint tenderness. Normal ROM upon flexion & extension.   Neurologic: Alert & Oriented X 3, Facial symmetry preserved and upper & lower extremities moving well with symmetry  Psychiatric: Calm, normal eye contact and normal affect       Medical Decision Making       55 MINUTES SPENT BY ME on the date of service doing chart review, history, exam, documentation & further activities per the note.  NOTE(S)/MEDICAL RECORDS REVIEWED over the past 24 hours: Vitals, labs, new imaging, documentation and medication administrations       Data     I have personally reviewed the following data over the past 24 hrs:    14.3 (H)   \   12.8 (L)   / 222     138 105 12.4 /  87   3.6 23 0.97 \     ALT: 75 (H) AST: 24 AP: 181 (H) TBILI: 0.9   ALB: 3.2 (L) TOT PROTEIN: 6.8 LIPASE: N/A       Imaging results reviewed over the past 24 hrs:   No results found for this or any previous visit (from the past 24 hours).

## 2025-03-15 NOTE — PLAN OF CARE
"  Problem: Adult Inpatient Plan of Care  Goal: Optimal Comfort and Wellbeing  Outcome: Progressing     Problem: Pain Acute  Goal: Optimal Pain Control and Function  Outcome: Progressing     Problem: Surgery Nonspecified  Goal: Effective Bowel Elimination  Outcome: Progressing     Problem: Surgery Nonspecified  Goal: Fluid and Electrolyte Balance  Outcome: Progressing   Goal Outcome Evaluation:       Pt A&OX4, on RA. C/o abd and back pain, oxy 2.5 and 5 mg po given.  PRN Tylenol 650 mg po given hot pack administered X 2 over night.  Pt finds some relief from pain intervention, rating pain from 10 to 5.  Pt given abd binder, wore it most of shift, took it of this AM, pt states it did help with pain.  Pt also c/o cramping and gas , Maalox and simethicone given  Pt on tele, NSR. LR running at 100, ml /hr, iv abx given as prescribed. Elevated BP over night sbp 180s , hydralazine 5 mg iv given, sbp down to  170s, 160s. Lap site steri strip c/d/I, abd tender and taut. Pt had medium BM over melanie shift  Pt able to let needs be know, SBA to restroom. Pt ambulated in unit during shift . Family at bedside this AM.   BP (!) 161/77 (BP Location: Right arm)   Pulse 103   Temp 98.4  F (36.9  C) (Oral)   Resp 22   Ht 1.575 m (5' 2\")   Wt 66.4 kg (146 lb 6.4 oz)   SpO2 95%   BMI 26.78 kg/m                  "

## 2025-03-15 NOTE — PLAN OF CARE
Patient plans to return home on discharge. Referral sent to financial counselor as patient is self-pay.    Family to transport home on discharge.    VIVIAN Nye

## 2025-03-16 ENCOUNTER — APPOINTMENT (OUTPATIENT)
Dept: PHYSICAL THERAPY | Facility: HOSPITAL | Age: 55
DRG: 417 | End: 2025-03-16

## 2025-03-16 LAB
ERYTHROCYTE [DISTWIDTH] IN BLOOD BY AUTOMATED COUNT: 14 % (ref 10–15)
HCT VFR BLD AUTO: 39.1 % (ref 40–53)
HGB BLD-MCNC: 12.5 G/DL (ref 13.3–17.7)
HOLD SPECIMEN: NORMAL
MCH RBC QN AUTO: 28.1 PG (ref 26.5–33)
MCHC RBC AUTO-ENTMCNC: 32 G/DL (ref 31.5–36.5)
MCV RBC AUTO: 88 FL (ref 78–100)
PLATELET # BLD AUTO: 299 10E3/UL (ref 150–450)
RBC # BLD AUTO: 4.45 10E6/UL (ref 4.4–5.9)
WBC # BLD AUTO: 12.4 10E3/UL (ref 4–11)

## 2025-03-16 PROCEDURE — 250N000011 HC RX IP 250 OP 636: Performed by: SPECIALIST

## 2025-03-16 PROCEDURE — 250N000013 HC RX MED GY IP 250 OP 250 PS 637: Performed by: INTERNAL MEDICINE

## 2025-03-16 PROCEDURE — 250N000013 HC RX MED GY IP 250 OP 250 PS 637: Performed by: HOSPITALIST

## 2025-03-16 PROCEDURE — 36415 COLL VENOUS BLD VENIPUNCTURE: CPT | Performed by: INTERNAL MEDICINE

## 2025-03-16 PROCEDURE — 97110 THERAPEUTIC EXERCISES: CPT | Mod: GP

## 2025-03-16 PROCEDURE — 250N000013 HC RX MED GY IP 250 OP 250 PS 637

## 2025-03-16 PROCEDURE — 250N000013 HC RX MED GY IP 250 OP 250 PS 637: Performed by: SPECIALIST

## 2025-03-16 PROCEDURE — 85027 COMPLETE CBC AUTOMATED: CPT | Performed by: INTERNAL MEDICINE

## 2025-03-16 PROCEDURE — 97116 GAIT TRAINING THERAPY: CPT | Mod: GP

## 2025-03-16 PROCEDURE — 120N000001 HC R&B MED SURG/OB

## 2025-03-16 PROCEDURE — 250N000013 HC RX MED GY IP 250 OP 250 PS 637: Performed by: STUDENT IN AN ORGANIZED HEALTH CARE EDUCATION/TRAINING PROGRAM

## 2025-03-16 RX ORDER — LOSARTAN POTASSIUM 25 MG/1
25 TABLET ORAL DAILY
Status: DISCONTINUED | OUTPATIENT
Start: 2025-03-16 | End: 2025-03-17 | Stop reason: HOSPADM

## 2025-03-16 RX ADMIN — OXYCODONE HYDROCHLORIDE 5 MG: 5 TABLET ORAL at 12:08

## 2025-03-16 RX ADMIN — PIPERACILLIN AND TAZOBACTAM 3.38 G: 3; .375 INJECTION, POWDER, FOR SOLUTION INTRAVENOUS at 00:10

## 2025-03-16 RX ADMIN — PANTOPRAZOLE SODIUM 40 MG: 40 INJECTION, POWDER, FOR SOLUTION INTRAVENOUS at 08:14

## 2025-03-16 RX ADMIN — ACETAMINOPHEN 650 MG: 325 TABLET, FILM COATED ORAL at 06:22

## 2025-03-16 RX ADMIN — SIMETHICONE 80 MG: 80 TABLET, CHEWABLE ORAL at 20:02

## 2025-03-16 RX ADMIN — MECLIZINE HYDROCHLORIDE 25 MG: 25 TABLET ORAL at 05:19

## 2025-03-16 RX ADMIN — ALUMINUM HYDROXIDE, MAGNESIUM HYDROXIDE, AND SIMETHICONE 30 ML: 200; 200; 20 SUSPENSION ORAL at 16:19

## 2025-03-16 RX ADMIN — PIPERACILLIN AND TAZOBACTAM 3.38 G: 3; .375 INJECTION, POWDER, FOR SOLUTION INTRAVENOUS at 08:14

## 2025-03-16 RX ADMIN — POLYETHYLENE GLYCOL 3350 17 G: 17 POWDER, FOR SOLUTION ORAL at 08:15

## 2025-03-16 RX ADMIN — FAMOTIDINE 20 MG: 10 INJECTION, SOLUTION INTRAVENOUS at 16:12

## 2025-03-16 RX ADMIN — ACETAMINOPHEN 650 MG: 325 TABLET, FILM COATED ORAL at 20:32

## 2025-03-16 RX ADMIN — METOPROLOL SUCCINATE 50 MG: 50 TABLET, EXTENDED RELEASE ORAL at 08:15

## 2025-03-16 RX ADMIN — OXYCODONE HYDROCHLORIDE 5 MG: 5 TABLET ORAL at 20:32

## 2025-03-16 RX ADMIN — AMLODIPINE BESYLATE 10 MG: 5 TABLET ORAL at 08:14

## 2025-03-16 RX ADMIN — ALUMINUM HYDROXIDE, MAGNESIUM HYDROXIDE, AND SIMETHICONE 30 ML: 200; 200; 20 SUSPENSION ORAL at 20:32

## 2025-03-16 RX ADMIN — ALUMINUM HYDROXIDE, MAGNESIUM HYDROXIDE, AND SIMETHICONE 30 ML: 200; 200; 20 SUSPENSION ORAL at 12:08

## 2025-03-16 RX ADMIN — ALUMINUM HYDROXIDE, MAGNESIUM HYDROXIDE, AND SIMETHICONE 30 ML: 200; 200; 20 SUSPENSION ORAL at 04:01

## 2025-03-16 RX ADMIN — PIPERACILLIN AND TAZOBACTAM 3.38 G: 3; .375 INJECTION, POWDER, FOR SOLUTION INTRAVENOUS at 18:00

## 2025-03-16 RX ADMIN — OXYCODONE HYDROCHLORIDE 5 MG: 5 TABLET ORAL at 16:19

## 2025-03-16 RX ADMIN — ACETAMINOPHEN 650 MG: 325 TABLET, FILM COATED ORAL at 16:19

## 2025-03-16 RX ADMIN — LOSARTAN POTASSIUM 25 MG: 25 TABLET, FILM COATED ORAL at 08:15

## 2025-03-16 RX ADMIN — SENNOSIDES 2 TABLET: 8.6 TABLET ORAL at 08:14

## 2025-03-16 RX ADMIN — ACETAMINOPHEN 650 MG: 325 TABLET, FILM COATED ORAL at 12:08

## 2025-03-16 RX ADMIN — OXYCODONE HYDROCHLORIDE 5 MG: 5 TABLET ORAL at 03:57

## 2025-03-16 ASSESSMENT — ACTIVITIES OF DAILY LIVING (ADL)
ADLS_ACUITY_SCORE: 35
ADLS_ACUITY_SCORE: 36
ADLS_ACUITY_SCORE: 35
ADLS_ACUITY_SCORE: 36
ADLS_ACUITY_SCORE: 36
ADLS_ACUITY_SCORE: 35
ADLS_ACUITY_SCORE: 36
ADLS_ACUITY_SCORE: 35
ADLS_ACUITY_SCORE: 36
ADLS_ACUITY_SCORE: 35
ADLS_ACUITY_SCORE: 36
ADLS_ACUITY_SCORE: 35
ADLS_ACUITY_SCORE: 36
ADLS_ACUITY_SCORE: 35
ADLS_ACUITY_SCORE: 36
ADLS_ACUITY_SCORE: 36

## 2025-03-16 NOTE — PROGRESS NOTES
"[facility]    Medicine Progress Note - Hospitalist Service    Date of Admission:  3/10/2025    Assessment & Plan   55 male with no prior medical history admitted 3/10/2025 with belly pain and abnormal LFTs and found to have acute cholecystitis.  Patient underwent cholecystectomy 3/11/2025 without complication.  Postop course complicated by fevers and persistent abdominal symptoms related to pancreatitis.    #Acute cholecystitis  #Acute pancreatitis  #Abnormal LFTs  -Presented with typical symptoms and imaging findings  -MRCP showing mild CBD dilation 7 mm, no choledocholithiasis  -Underwent lap ally 3/11, IOC benign  -Advancing diet as tolerated, abdominal symptoms seem to be flaring 3/14, will make n.p.o. again and monitor for further improvement  -Continue antibiotics pending blood culture results  -Supportive measures with pain and nausea meds and IV fluids    #Noncardiac chest pain  -Intermittent postoperatively  -EKG and troponins benign, monitor clinically  -Consider outpatient stress test    #OPLLY  -Resolved, likely from pancreatitis and prerenal azotemia  -Monitor with daily labs, support with fluids    #Elevated blood pressure  -No pre-existing hypertension, start Norvasc 3/14    #Leukocytosis  -Infectious and inflammatory, continues to be elevated, trend daily          Diet: Advance Diet as Tolerated: Full Liquid Diet; Low Fiber; Low Fat Diet    DVT Prophylaxis: Pneumatic Compression Devices  Hercules Catheter: Not present  Lines: None     Cardiac Monitoring: ACTIVE order. Indication: Tachyarrhythmias, acute (48 hours)  Code Status: Full Code      Clinically Significant Risk Factors               # Hypoalbuminemia: Lowest albumin = 2.8 g/dL at 3/14/2025  2:59 AM, will monitor as appropriate                # Overweight: Estimated body mass index is 26.78 kg/m  as calculated from the following:    Height as of this encounter: 1.575 m (5' 2\").    Weight as of this encounter: 66.4 kg (146 " lb 6.4 oz).             Social Drivers of Health            Disposition Plan     Medically Ready for Discharge: Anticipated in 2-4 Days             Yen Rivas MD  Hospitalist Service  [facility]  Securely message with [employer] (more info)  Text page via [employer] Paging/Directory   ______________________________________________________________________    Interval History   No new complaints today.  Abdominal pain has improved.  Tolerating clear liquid diet.  Possible discharge today if tolerating oral intake.    Physical Exam   Vital Signs: Temp: 99.1  F (37.3  C) Temp src: Oral BP: (!) 144/81 Pulse: 80   Resp: 18 SpO2: 97 % O2 Device: None (Room air)    Weight: 146 lbs 6.4 oz    General appearance: Awake, Alert, Cooperative, not in any obvious distress and appears stated age   HEENT: Normocephalic, atraumatic, conjunctiva clear without icterus and ears without discharge  Lungs: Clear to auscultation bilaterally, no wheezing, good air exchange, normal work of breathing  Cardiovascular: Regular Rate and Rythm, normal apical impulse, normal S1 and S2, trace lower extremity edema bilaterally  Abdomen: Ecchymosis around the trochars, diffuse vague tenderness  Skin: Skin color, texture normal and bruising or bleeding. No rashes or lesions over face, neck, arms and legs, turgor normal.  Musculoskeletal: No bony deformities or joint tenderness. Normal ROM upon flexion & extension.   Neurologic: Alert & Oriented X 3, Facial symmetry preserved and upper & lower extremities moving well with symmetry  Psychiatric: Calm, normal eye contact and normal affect       Medical Decision Making       55 MINUTES SPENT BY ME on the date of service doing chart review, history, exam, documentation & further activities per the note.  NOTE(S)/MEDICAL RECORDS REVIEWED over the past 24 hours: Vitals, labs, new imaging, documentation and medication administrations       Data     I have personally reviewed the  following data over the past 24 hrs:    12.4 (H)  \   12.5 (L)   / 299     N/A N/A N/A /  N/A   N/A N/A N/A \       Imaging results reviewed over the past 24 hrs:   No results found for this or any previous visit (from the past 24 hours).

## 2025-03-16 NOTE — PLAN OF CARE
Patient tolerating clear liquids. Pain controlled with prn oxycodone, tylenol and maalox. IV abx infusing per orders. IVF on hold per MD.     Zenaida Peter RN

## 2025-03-16 NOTE — PLAN OF CARE
Patient tolerating diet. Pain controlled with prn oxycodone, tylenol and maalox. Ambulates using walker. IV abx infusing per orders.     Zenaida Peter RN

## 2025-03-16 NOTE — PROGRESS NOTES
General Surgery Progress Note:    Hospital Day # 6    ASSESSMENT:  1. Generalized abdominal pain    2. Chest pain, unspecified type    3. Acute cholecystitis    4. Elevated LFTs      Blue Davey is a 55 year old male who is s/p laparoscopic cholecystectomy with negative IOC on 3/11/25. Post-op course notable for pancreatitis with normalization of lipase, elevated total bili which normalized yesterday, and leukocytosis trending down (17.4 to 14.3). Reports abdominal pain continues to improve and tolerable with current pain regimen. Tolerating clear liquid diet with no N/V. OK to ADAT to low fat/fiber diet. If tolerating diet advancement and pain still controlled then OK to discharge from a surgical standpoint.    PLAN:  - ADAT to low fat/fiber diet  - Encourage activity and ambulation to promote bowel function  - PO pain control  - Medical management per primary team  - OK to discharge from surgical standpoint when deemed medically appropriate once tolerating diet advancement and pain still controlled  - Discharge recommendations and instructions placed in AVS     SUBJECTIVE:   He is feeling better. Tolerating clear liquid diet with no nausea or vomiting. States abdominal pain is tolerable with current pain regimen. Is passing gas and having BMs. Voiding with no issues. Denies fever, chills. Complaining of some lightheadedness when getting up too quickly.    Patient Vitals for the past 24 hrs:   BP Temp Temp src Pulse Resp SpO2   03/16/25 0820 (!) 159/90 97.7  F (36.5  C) Oral 94 18 96 %   03/16/25 0344 (!) 174/95 98.8  F (37.1  C) Oral 94 18 97 %   03/15/25 2344 137/85 98.6  F (37  C) Oral 79 18 94 %   03/15/25 1938 (!) 155/86 98  F (36.7  C) Oral 85 18 94 %   03/15/25 1800 (!) 161/87 -- -- -- -- --   03/15/25 1700 (!) 188/92 -- -- -- -- --   03/15/25 1523 (!) 182/83 98  F (36.7  C) Oral 65 14 95 %   03/15/25 1210 (!) 162/83 98.3  F (36.8  C) Oral -- 20 96 %     PHYSICAL EXAM:  General: patient seen resting in bed,  no acute distress  Resp: no respiratory distress, breathing comfortably on RA  Abdomen: soft, mildly distended, tender postoperatively around incision sites. Incisions clean, dry, intact Steri-Strips. Ecchymosis noted surrounding periumbilical incision.  Extremities: warm and well perfused    03/15 0700 - 03/16 0659  In: 1880 [P.O.:1680; I.V.:200]  Out: -     No results displayed because visit has over 200 results.           Laura Gallegos PA-C  [facility] General Surgery  [facility] [address] [place] [id number]

## 2025-03-16 NOTE — PLAN OF CARE
Problem: Pain Acute  Goal: Optimal Pain Control and Function  Outcome: Progressing  Intervention: Develop Pain Management Plan  Recent Flowsheet Documentation  Taken 3/16/2025 3537 by Gemini Prescott RN  Pain Management Interventions: medication (see MAR)   Goal Outcome Evaluation:       Pt alert/oriented. Abdominal pain controlled with prn oxycodone and Maalox with relief. NSR on tele, up with one assist. Tolerating clear liquids, call light within reach.

## 2025-03-17 ENCOUNTER — APPOINTMENT (OUTPATIENT)
Dept: PHYSICAL THERAPY | Facility: HOSPITAL | Age: 55
DRG: 417 | End: 2025-03-17

## 2025-03-17 VITALS
WEIGHT: 146.4 LBS | BODY MASS INDEX: 26.94 KG/M2 | RESPIRATION RATE: 18 BRPM | OXYGEN SATURATION: 96 % | HEART RATE: 83 BPM | TEMPERATURE: 99.2 F | SYSTOLIC BLOOD PRESSURE: 155 MMHG | DIASTOLIC BLOOD PRESSURE: 90 MMHG | HEIGHT: 62 IN

## 2025-03-17 LAB
ANION GAP SERPL CALCULATED.3IONS-SCNC: 7 MMOL/L (ref 7–15)
BACTERIA BLD CULT: NO GROWTH
BACTERIA BLD CULT: NO GROWTH
BUN SERPL-MCNC: 10.3 MG/DL (ref 6–20)
CALCIUM SERPL-MCNC: 8.9 MG/DL (ref 8.8–10.4)
CHLORIDE SERPL-SCNC: 105 MMOL/L (ref 98–107)
CREAT SERPL-MCNC: 0.96 MG/DL (ref 0.67–1.17)
EGFRCR SERPLBLD CKD-EPI 2021: >90 ML/MIN/1.73M2
ERYTHROCYTE [DISTWIDTH] IN BLOOD BY AUTOMATED COUNT: 14 % (ref 10–15)
GLUCOSE BLDC GLUCOMTR-MCNC: 87 MG/DL (ref 70–99)
GLUCOSE SERPL-MCNC: 101 MG/DL (ref 70–99)
HCO3 SERPL-SCNC: 24 MMOL/L (ref 22–29)
HCT VFR BLD AUTO: 38.2 % (ref 40–53)
HGB BLD-MCNC: 12.1 G/DL (ref 13.3–17.7)
MCH RBC QN AUTO: 27.7 PG (ref 26.5–33)
MCHC RBC AUTO-ENTMCNC: 31.7 G/DL (ref 31.5–36.5)
MCV RBC AUTO: 87 FL (ref 78–100)
PLATELET # BLD AUTO: 350 10E3/UL (ref 150–450)
POTASSIUM SERPL-SCNC: 4 MMOL/L (ref 3.4–5.3)
RBC # BLD AUTO: 4.37 10E6/UL (ref 4.4–5.9)
SODIUM SERPL-SCNC: 136 MMOL/L (ref 135–145)
WBC # BLD AUTO: 13.1 10E3/UL (ref 4–11)

## 2025-03-17 PROCEDURE — 250N000013 HC RX MED GY IP 250 OP 250 PS 637: Performed by: SPECIALIST

## 2025-03-17 PROCEDURE — 97116 GAIT TRAINING THERAPY: CPT | Mod: GP

## 2025-03-17 PROCEDURE — 250N000013 HC RX MED GY IP 250 OP 250 PS 637: Performed by: INTERNAL MEDICINE

## 2025-03-17 PROCEDURE — 250N000013 HC RX MED GY IP 250 OP 250 PS 637

## 2025-03-17 PROCEDURE — 82435 ASSAY OF BLOOD CHLORIDE: CPT | Performed by: INTERNAL MEDICINE

## 2025-03-17 PROCEDURE — 80048 BASIC METABOLIC PNL TOTAL CA: CPT | Performed by: INTERNAL MEDICINE

## 2025-03-17 PROCEDURE — 250N000011 HC RX IP 250 OP 636: Performed by: SPECIALIST

## 2025-03-17 PROCEDURE — 250N000013 HC RX MED GY IP 250 OP 250 PS 637: Performed by: HOSPITALIST

## 2025-03-17 PROCEDURE — 85014 HEMATOCRIT: CPT | Performed by: INTERNAL MEDICINE

## 2025-03-17 PROCEDURE — 97530 THERAPEUTIC ACTIVITIES: CPT | Mod: GP

## 2025-03-17 PROCEDURE — 85041 AUTOMATED RBC COUNT: CPT | Performed by: INTERNAL MEDICINE

## 2025-03-17 PROCEDURE — 36415 COLL VENOUS BLD VENIPUNCTURE: CPT | Performed by: INTERNAL MEDICINE

## 2025-03-17 RX ORDER — METOPROLOL SUCCINATE 50 MG/1
50 TABLET, EXTENDED RELEASE ORAL DAILY
Qty: 30 TABLET | Refills: 0 | Status: SHIPPED | OUTPATIENT
Start: 2025-03-18

## 2025-03-17 RX ORDER — OMEPRAZOLE 20 MG/1
20 TABLET, DELAYED RELEASE ORAL DAILY
Qty: 30 TABLET | Refills: 0 | Status: SHIPPED | OUTPATIENT
Start: 2025-03-17

## 2025-03-17 RX ORDER — AMOXICILLIN 250 MG
1 CAPSULE ORAL DAILY PRN
COMMUNITY
Start: 2025-03-17

## 2025-03-17 RX ORDER — POLYETHYLENE GLYCOL 3350 17 G/17G
1 POWDER, FOR SOLUTION ORAL DAILY PRN
COMMUNITY
Start: 2025-03-17

## 2025-03-17 RX ORDER — AMLODIPINE BESYLATE 10 MG/1
10 TABLET ORAL DAILY
Qty: 30 TABLET | Refills: 0 | Status: SHIPPED | OUTPATIENT
Start: 2025-03-18

## 2025-03-17 RX ORDER — LOSARTAN POTASSIUM 25 MG/1
25 TABLET ORAL DAILY
Qty: 30 TABLET | Refills: 0 | Status: SHIPPED | OUTPATIENT
Start: 2025-03-18

## 2025-03-17 RX ADMIN — ALUMINUM HYDROXIDE, MAGNESIUM HYDROXIDE, AND SIMETHICONE 30 ML: 200; 200; 20 SUSPENSION ORAL at 08:51

## 2025-03-17 RX ADMIN — PIPERACILLIN AND TAZOBACTAM 3.38 G: 3; .375 INJECTION, POWDER, FOR SOLUTION INTRAVENOUS at 01:18

## 2025-03-17 RX ADMIN — PIPERACILLIN AND TAZOBACTAM 3.38 G: 3; .375 INJECTION, POWDER, FOR SOLUTION INTRAVENOUS at 10:49

## 2025-03-17 RX ADMIN — ALUMINUM HYDROXIDE, MAGNESIUM HYDROXIDE, AND SIMETHICONE 30 ML: 200; 200; 20 SUSPENSION ORAL at 14:39

## 2025-03-17 RX ADMIN — ALUMINUM HYDROXIDE, MAGNESIUM HYDROXIDE, AND SIMETHICONE 30 ML: 200; 200; 20 SUSPENSION ORAL at 02:53

## 2025-03-17 RX ADMIN — OXYCODONE HYDROCHLORIDE 5 MG: 5 TABLET ORAL at 02:53

## 2025-03-17 RX ADMIN — SENNOSIDES 2 TABLET: 8.6 TABLET ORAL at 08:53

## 2025-03-17 RX ADMIN — OXYCODONE HYDROCHLORIDE 5 MG: 5 TABLET ORAL at 14:39

## 2025-03-17 RX ADMIN — ACETAMINOPHEN 650 MG: 325 TABLET, FILM COATED ORAL at 14:39

## 2025-03-17 RX ADMIN — LOSARTAN POTASSIUM 25 MG: 25 TABLET, FILM COATED ORAL at 08:53

## 2025-03-17 RX ADMIN — PANTOPRAZOLE SODIUM 40 MG: 40 INJECTION, POWDER, FOR SOLUTION INTRAVENOUS at 08:43

## 2025-03-17 RX ADMIN — ACETAMINOPHEN 650 MG: 325 TABLET, FILM COATED ORAL at 08:51

## 2025-03-17 RX ADMIN — POLYETHYLENE GLYCOL 3350 17 G: 17 POWDER, FOR SOLUTION ORAL at 08:58

## 2025-03-17 RX ADMIN — AMLODIPINE BESYLATE 10 MG: 5 TABLET ORAL at 08:52

## 2025-03-17 RX ADMIN — ACETAMINOPHEN 650 MG: 325 TABLET, FILM COATED ORAL at 02:53

## 2025-03-17 RX ADMIN — OXYCODONE HYDROCHLORIDE 5 MG: 5 TABLET ORAL at 08:53

## 2025-03-17 RX ADMIN — METOPROLOL SUCCINATE 50 MG: 50 TABLET, EXTENDED RELEASE ORAL at 08:51

## 2025-03-17 ASSESSMENT — ACTIVITIES OF DAILY LIVING (ADL)
ADLS_ACUITY_SCORE: 35
ADLS_ACUITY_SCORE: 36
ADLS_ACUITY_SCORE: 35
ADLS_ACUITY_SCORE: 36
ADLS_ACUITY_SCORE: 35

## 2025-03-17 NOTE — DISCHARGE SUMMARY
"[facility]  Hospitalist Discharge Summary      Date of Admission:  3/10/2025  Date of Discharge:  3/17/2025  Discharging Provider: Yen Rivas MD  Discharge Service: Hospitalist Service    Discharge Diagnoses   Sepsis secondary to acute cholecystitis  Acute pancreatitis  Acute kidney injury  Hypertension  Postoperative ileus    Clinically Significant Risk Factors     # Overweight: Estimated body mass index is 26.78 kg/m  as calculated from the following:    Height as of this encounter: 1.575 m (5' 2\").    Weight as of this encounter: 66.4 kg (146 lb 6.4 oz).       Follow-ups Needed After Discharge   Follow-up Appointments       Hospital Follow-up with Existing Primary Care Provider (PCP)      Please see details below   Continue low-fat low fiber diet for 1 week and transition to regular diet slowly afterwards if pain improves  Monitor WBC  Outpatient follow-up at the general surgery clinic as arranged  Monitor blood pressure and adjust antihypertensive regimen as needed    Schedule Primary Care visit within: 7 Days             Unresulted Labs Ordered in the Past 30 Days of this Admission       Date and Time Order Name Status Description    3/12/2025  5:13 PM Blood Culture Arm, Right Preliminary     3/12/2025  5:13 PM Blood Culture Arm, Right Preliminary         These results will be followed up by Clinic, [facility]      Discharge Disposition   Discharged to home  Condition at discharge: Stable    Hospital Course   Blue Davey is a 55 male with no known significant significant issues admitted on 3/10/2025 with belly pain and abnormal LFTs and found to have acute cholecystitis.     He was placed on IV antibiotics on admission and subsequently underwent cholecystectomy on 3/11/2025 without any incidence. Postoperatively, he developed fever and persistent abdominal pain attributed to pancreatitis. MRCP showed common bile duct dilatation up to 7 mm without choledocholithiasis.  " Blood culture is negative after 4 days.  He completed 7-day course of IV Zosyn during hospital stay.    Postoperative course was complicated by acute kidney injury likely prerenal secondary to acute pancreatitis.  Acute pancreatitis improved with IV hydration and analgesics. POLLY has resolved. Antihypertensives were initiated during hospital stay due to uncontrolled hypertension.    Symptoms have improved and patient has been tolerating oral intake.  He is clinically stable for discharge at this time.    Consultations This Hospital Stay   GASTROENTEROLOGY IP CONSULT  SURGERY GENERAL IP CONSULT  CARDIOLOGY IP CONSULT  PHYSICAL THERAPY ADULT IP CONSULT  OCCUPATIONAL THERAPY ADULT IP CONSULT  CARE MANAGEMENT / SOCIAL WORK IP CONSULT  CARE MANAGEMENT / SOCIAL WORK IP CONSULT    Code Status   Full Code    Time Spent on this Encounter   I, Yen Rivas MD, personally saw the patient today and spent greater than 30 minutes discharging this patient.       Yen Rivas MD  [address] [address]  Phone: [phone]  Fax: [phone]  ______________________________________________________________________    Physical Exam   Vital Signs: Temp: 97.9  F (36.6  C) Temp src: Oral BP: 138/75 Pulse: 78   Resp: 18 SpO2: 98 % O2 Device: None (Room air)    Weight: 146 lbs 6.4 oz    General appearance: Awake, Alert, Cooperative, not in any obvious distress and appears stated age   HEENT: Normocephalic, atraumatic, conjunctiva clear without icterus and ears without discharge  Lungs: Clear to auscultation bilaterally, no wheezing, good air exchange, normal work of breathing  Cardiovascular: Regular Rate and Rythm, normal apical impulse, normal S1 and S2, no lower extremity edema bilaterally  Abdomen: Soft, epigastric tenderness - improved and non-distended, active bowel sounds  Skin: Skin color, texture normal and bruising or bleeding. No rashes or lesions over  face, neck, arms and legs, turgor normal.  Musculoskeletal: No bony deformities or joint tenderness. Normal ROM upon flexion & extension.   Neurologic: Alert & Oriented X 3, Facial symmetry preserved and upper & lower extremities moving well with symmetry  Psychiatric: Calm, normal eye contact and normal affect         Primary Care Physician   [facility]    Discharge Orders      Medication Therapy Management Referral      Activity    Restrictions: You should not lift greater than 20 pounds for 2 weeks, and will want to avoid strenuous physical activity for 1-2 weeks.  You should limit your physical activity if it causes you discomfort; however, this should resolve within 1-2 weeks.   Walking does not count as strenuous physical activity and you are safe to walk up and down stairs.  Following 2 weeks you may resume normal physical activity.    Activity: You should continue to be active at home, including getting up and walking frequently.  If possible, limit the amount of time spent in bed.    Work:  You can return to work once your surgical pain has resolved.  If you perform duties that require lifting, pushing or pulling anything greater than 15 pounds, you should be on light duty for the immediate 2 weeks after your surgery (if your work allows light duty).  After 2 weeks, you can return to work without restrictions.     Wound care and dressings    You may remove your outer dressings after a period of 48 hours. The small white strips on the incisions act like artificial scabs, and will begin to peel at the edges at around 7-10 days.  These can then be removed.     It is normal to have a small rim of red present around the incisions. This should not, however, extend beyond 1/4 inch from the incision.  If your incisions become increasingly tender, red, or draining, please contact us.    You may shower after 48 hours from surgery.  It is ok to get your incisions wet, but avoid rubbing them.  Avoid  soaking in bath tubs, or swimming in lakes, pools, or streams for 2 weeks following surgery.     When to contact your care team    From your Surgery Team:    Follow up:  For straightforward laparoscopic procedures, our practice is to check-in with you over the phone a few days after your procedure.  If you would like a scheduled follow up appointment in clinic, please call us at [phone] to schedule an appointment at your convenience.     Discharge Instructions     Reason for your hospital stay    Acute cholecystitis  Acute pancreatitis  Acute kidney injury  Hypertension  Postoperative ileus     Activity    Your activity upon discharge: activity as tolerated     Walker Order     Diet    Follow this diet upon discharge: Current Diet:Orders Placed This Encounter      Low Saturated Fat Na <2400 mg     Hospital Follow-up with Existing Primary Care Provider (PCP)    Please see details below   Continue low-fat low fiber diet for 1 week and transition to regular diet slowly afterwards if pain improves  Monitor WBC  Outpatient follow-up at the general surgery clinic as arranged  Monitor blood pressure and adjust antihypertensive regimen as needed       Significant Results and Procedures   Results for orders placed or performed during the hospital encounter of 03/10/25   CTA Chest Abdomen Pelvis w Contrast    Narrative    EXAM: CTA CHEST ABDOMEN PELVIS W CONTRAST  LOCATION: [facility]  DATE: 3/10/2025    INDICATION: Chest pain. Abdominal pain.  COMPARISON: 10/01/2015.  TECHNIQUE: CT angiogram chest abdomen pelvis during arterial phase of injection of IV contrast. 2D and 3D MIP reconstructions were performed by the CT technologist. Dose reduction techniques were used.   CONTRAST: 90 mL isovue 370    FINDINGS:   CT ANGIOGRAM CHEST, ABDOMEN, AND PELVIS: Mild atheromatous changes with minimal associated calcification involving the abdominal aorta. No dissection, intimal tearing, significant stenosis,  or aneurysmal dilatation identified involving the thoracic   aorta, abdominal aorta, iliac arteries, or great vessels arising from the abdominal aorta. There is good flow seen extending into both groins. The pulmonary arteries are well-opacified and show no evidence for PE.    LUNGS AND PLEURA: Benign calcified granuloma in the right lower lobe. Mild dependent atelectasis seen in both lower lobes with no central airway obstruction.    MEDIASTINUM/AXILLAE: Minimal thin mucus is seen in the trachea and the left mainstem bronchus. Mild cardiomegaly.    CORONARY ARTERY CALCIFICATION: None.    HEPATOBILIARY: Moderate to advanced fatty infiltration of the liver. Mild pericholecystic fluid with gallbladder thickening measuring up to approximately 4.6 mm. These findings can be associated with acute cholecystitis and an ultrasound may be of   benefit for further evaluation if clinically indicated. The bile ducts are normal in caliber. The hepatic/portal veins are patent.    PANCREAS: There is significant abnormal stranding of the fat with some minimal associated fluid seen surrounding the pancreas most marked in the head region consistent with acute pancreatitis. No evidence for a pseudocyst or necrotizing features. Benign   calcifications seen in the pancreatic head. Normal caliber pancreatic duct.    SPLEEN: Normal.    ADRENAL GLANDS: Normal.    KIDNEYS/BLADDER: Normal.    BOWEL: There is an appendicolith. The appendix is otherwise normal with no enlargement of the appendix or stranding of the adjacent fat identified.    LYMPH NODES: Normal.    PELVIC ORGANS: Mild prostatic gland enlargement.    MUSCULOSKELETAL: Small fatty umbilical hernia with no associated bowel or inflammation. Mild thoracolumbar spinal curvature with mild scattered hypertrophic changes.      Impression    IMPRESSION:  1.  Findings consistent with acute pancreatitis with no necrotizing features or pseudocyst formation.    2.  Moderate to advanced fatty  infiltration of the liver.    3.  Mild pericholecystic fluid with gallbladder wall thickening measuring up to 4.6 mm. These findings can be seen with acute cholecystitis and ultrasound may be of benefit for further evaluation.    4.  Calcifications seen in the pancreatic head with limited detail given motion artifact. I do not see any significant bile duct dilatation to suggest this calcification is in the distal common bile duct.    5.  No acute vascular abnormalities.    6.  Appendicolith with the appendix otherwise normal in appearance.     US Abdomen Limited    Narrative    EXAM: US ABDOMEN LIMITED  LOCATION: [facility]  DATE: 3/10/2025    INDICATION: RUq pain, look for stones  COMPARISON: CT performed the same day.  TECHNIQUE: Limited abdominal ultrasound.    FINDINGS:    GALLBLADDER: No stones are identified. Gallbladder wall thickening measuring up to 7 mm. Sonographic Ruggiero sign is negative.    BILE DUCTS: No biliary dilatation. The common duct measures 7 mm.    LIVER: Increased echogenicity from diffuse fatty infiltration. No focal mass. The portal vein is patent with flow in the normal direction. 3 cm cyst within the liver. Minimal intrahepatic biliary ductal dilation.    RIGHT KIDNEY: No hydronephrosis.    PANCREAS: The pancreas is largely obscured by overlying gas.    No ascites.      Impression    IMPRESSION:  1.  No stones are identified. Gallbladder wall is thickened which may be related to liver disease or acalculus cholecystitis.  2.  Mild hepatic steatosis.       MR Abdomen MRCP w/o & w Contrast    Narrative    EXAM: MR ABDOMEN MRCP W/O and W CONTRAST  LOCATION: [facility]  DATE: 3/10/2025    INDICATION: Abnormal LFTs, acute cholecystitis and pancreatitis, concern for biliary obstruction  COMPARISON: None.  TECHNIQUE: Routine MR liver/pancreas protocol including axial and coronal MRCP sequences. 2D and 3D reconstruction performed by MR technologist  including MIP reconstruction and slab cholangiograms. If performed with contrast, additional dynamic T1 post   IV contrast images.   CONTRAST: 7 mL Gadavist     FINDINGS:   Examination is partially degraded by motion artifact.    MRCP: There is mild dilation of the common bile duct up to 7 mm, however no choledocholithiasis is evident. No significant intrahepatic biliary ductal dilation.    There is mild gallbladder wall thickening near the fundus, which is favored to be related to focal adenomyomatosis. No significant adjacent inflammation or cholecystic fluid.    LIVER: Hepatic steatosis. Scattered benign hepatic cysts, measuring up to 2.9 cm in the right hepatic lobe; these require no follow-up. No suspicious liver lesions.    PANCREAS: As on CT, there is mild pancreatic and peripancreatic edema, however no evidence of parenchymal hypoenhancement, fluid collection, or ductal dilation.    ADDITIONAL FINDINGS: The spleen, adrenal glands and kidneys are unremarkable. Visualized bowel is normal in caliber. The abdominal aorta is nonaneurysmal. No lymphadenopathy.      Impression    IMPRESSION:  1.  Mild dilation of the common bile duct up to 7 mm, however no choledocholithiasis or intrahepatic biliary ductal dilation. Note that impacted ampullary stones may be occult on imaging.  2.  Mild gallbladder wall thickening near the fundus, which is favored to be related to focal adenomyomatosis. No significant adjacent inflammation or pericholecystic fluid.  3.  Similar findings of acute uncomplicated interstitial pancreatitis.  4.  Hepatic steatosis.     XR Surgery VIVIANE Fluoro L/T 5 Min    Narrative    This exam was marked as non-reportable because it will not be read by a   radiologist or a [place] non-radiologist provider.         XR Chest 2 Views    Narrative    EXAM: XR CHEST 2 VIEWS  LOCATION: [facility]  DATE: 3/13/2025    INDICATION: Fever  COMPARISON: CT 3/10/2023      Impression     IMPRESSION: Stable enlarged cardiac silhouette. No pulmonary vascular congestion. No significant effusion seen. Stable lateral lung base opacity could represent atelectasis, scar, or pneumonia.       Discharge Medications   Current Discharge Medication List        START taking these medications    Details   acetaminophen (TYLENOL) 325 MG tablet Take 1-2 tablets (325-650 mg) by mouth every 6 hours as needed for mild pain.    Associated Diagnoses: Acute cholecystitis      amLODIPine (NORVASC) 10 MG tablet Take 1 tablet (10 mg) by mouth daily.  Qty: 30 tablet, Refills: 0    Associated Diagnoses: Generalized abdominal pain; Chest pain, unspecified type; Acute cholecystitis; POLLY (acute kidney injury)      ibuprofen (ADVIL/MOTRIN) 200 MG tablet Take 1-2 tablets (200-400 mg) by mouth every 6 hours as needed for pain.    Associated Diagnoses: Acute cholecystitis      losartan (COZAAR) 25 MG tablet Take 1 tablet (25 mg) by mouth daily.  Qty: 30 tablet, Refills: 0    Associated Diagnoses: Generalized abdominal pain; Chest pain, unspecified type; Acute cholecystitis; POLLY (acute kidney injury)      metoprolol succinate ER (TOPROL XL) 50 MG 24 hr tablet Take 1 tablet (50 mg) by mouth daily.  Qty: 30 tablet, Refills: 0    Associated Diagnoses: Generalized abdominal pain; Chest pain, unspecified type; Acute cholecystitis; POLLY (acute kidney injury)      omeprazole (PRILOSEC OTC) 20 MG EC tablet Take 1 tablet (20 mg) by mouth daily.  Qty: 30 tablet, Refills: 0    Associated Diagnoses: Generalized abdominal pain; Chest pain, unspecified type; Acute cholecystitis; POLLY (acute kidney injury)      polyethylene glycol (MIRALAX) 17 GM/Dose powder Take 17 g (1 Capful) by mouth daily as needed for constipation.    Associated Diagnoses: Acute cholecystitis      senna-docusate (SENOKOT-S/PERICOLACE) 8.6-50 MG tablet Take 1 tablet by mouth daily as needed for constipation.    Associated Diagnoses: Acute cholecystitis           Allergies   No  Known Allergies

## 2025-03-17 NOTE — PLAN OF CARE
"  Problem: Adult Inpatient Plan of Care  Goal: Plan of Care Review  Description: The Plan of Care Review/Shift note should be completed every shift.  The Outcome Evaluation is a brief statement about your assessment that the patient is improving, declining, or no change.  This information will be displayed automatically on your shift  note.  Outcome: Met  Goal: Patient-Specific Goal (Individualized)  Description: You can add care plan individualizations to a care plan. Examples of Individualization might be:  \"Parent requests to be called daily at 9am for status\", \"I have a hard time hearing out of my right ear\", or \"Do not touch me to wake me up as it startles  me\".  Outcome: Met  Goal: Absence of Hospital-Acquired Illness or Injury  Outcome: Met  Intervention: Identify and Manage Fall Risk  Recent Flowsheet Documentation  Taken 3/17/2025 1110 by Katty Cruz RN  Safety Promotion/Fall Prevention:   activity supervised   assistive device/personal items within reach   clutter free environment maintained   nonskid shoes/slippers when out of bed   patient and family education   room organization consistent   safety round/check completed  Taken 3/17/2025 0838 by Katty Cruz RN  Safety Promotion/Fall Prevention:   activity supervised   assistive device/personal items within reach   clutter free environment maintained   nonskid shoes/slippers when out of bed   patient and family education   room organization consistent   safety round/check completed  Goal: Optimal Comfort and Wellbeing  3/17/2025 1536 by Katty Cruz, RN  Outcome: Met  3/17/2025 1449 by Katty Cruz RN  Outcome: Progressing  Intervention: Monitor Pain and Promote Comfort  Recent Flowsheet Documentation  Taken 3/17/2025 0853 by Katty Cruz RN  Pain Management Interventions:   medication (see MAR)   emotional support  Goal: Readiness for Transition of Care  Outcome: Met     Problem: Pain Acute  Goal: Optimal Pain Control and Function  3/17/2025 " 1536 by Katty Cruz RN  Outcome: Met  3/17/2025 1449 by Katty Cruz RN  Outcome: Progressing  Intervention: Develop Pain Management Plan  Recent Flowsheet Documentation  Taken 3/17/2025 0853 by Katty Cruz RN  Pain Management Interventions:   medication (see MAR)   emotional support  Intervention: Prevent or Manage Pain  Recent Flowsheet Documentation  Taken 3/17/2025 1110 by Katty Cruz RN  Medication Review/Management: medications reviewed  Taken 3/17/2025 0838 by Katty Cruz RN  Medication Review/Management: medications reviewed     Problem: Surgery Nonspecified  Goal: Absence of Bleeding  3/17/2025 1536 by Katty Cruz RN  Outcome: Met  3/17/2025 1449 by Katty Cruz RN  Outcome: Progressing  Goal: Effective Bowel Elimination  3/17/2025 1536 by Katty Cruz RN  Outcome: Met  3/17/2025 1449 by Katty Cruz RN  Outcome: Progressing  Goal: Fluid and Electrolyte Balance  Outcome: Met  Goal: Blood Glucose Level Within Targeted Range  Outcome: Met  Goal: Absence of Infection Signs and Symptoms  3/17/2025 1536 by Katty Cruz RN  Outcome: Met  3/17/2025 1449 by Katty Cruz RN  Outcome: Progressing  Goal: Anesthesia/Sedation Recovery  Outcome: Met  Intervention: Optimize Anesthesia Recovery  Recent Flowsheet Documentation  Taken 3/17/2025 1110 by Katty Cruz RN  Safety Promotion/Fall Prevention:   activity supervised   assistive device/personal items within reach   clutter free environment maintained   nonskid shoes/slippers when out of bed   patient and family education   room organization consistent   safety round/check completed  Taken 3/17/2025 0838 by Katty Cruz RN  Safety Promotion/Fall Prevention:   activity supervised   assistive device/personal items within reach   clutter free environment maintained   nonskid shoes/slippers when out of bed   patient and family education   room organization consistent   safety round/check completed  Goal: Optimal Pain Control and  Function  Outcome: Met  Intervention: Prevent or Manage Pain  Recent Flowsheet Documentation  Taken 3/17/2025 0853 by Katty Cruz RN  Pain Management Interventions:   medication (see MAR)   emotional support  Goal: Nausea and Vomiting Relief  3/17/2025 1536 by Katty Cruz, RN  Outcome: Met  3/17/2025 1449 by Katty Cruz RN  Outcome: Progressing  Goal: Effective Urinary Elimination  Outcome: Met  Goal: Effective Oxygenation and Ventilation  Outcome: Met  Intervention: Optimize Oxygenation and Ventilation  Recent Flowsheet Documentation  Taken 3/17/2025 1436 by Katty Cruz RN  Activity Management: ambulated to bathroom     Problem: Hypertension Acute  Goal: Blood Pressure Within Desired Range  Outcome: Met  Intervention: Normalize Blood Pressure  Recent Flowsheet Documentation  Taken 3/17/2025 1110 by Katty Cruz RN  Medication Review/Management: medications reviewed  Taken 3/17/2025 0838 by Katty Cruz RN  Medication Review/Management: medications reviewed   Goal Outcome Evaluation:       Patient alert and oriented x 4. Pain medications given prior to discharge. Relief reported. Discharge instructions discussed with patient using  services. Patient verbalized understanding. Patient left with all personal belongings including cellphone, eyeglasses, and walker.

## 2025-03-17 NOTE — PLAN OF CARE
Goal Outcome Evaluation:  Blue had a good night. Advanced to a low fat diet and he tolerated it well. Denied increased pain or nausea. Pain to abdomen and flank rating up to 8/10. He requests Oxycodone, Tylenol, and Maalox to be taken at the same time. Independent in room. A&Ox4, very pleasant. Room air. NSR on telemetry.

## 2025-03-17 NOTE — PROGRESS NOTES
General Surgery Progress Note:    Hospital Day # 7    ASSESSMENT:  1. Generalized abdominal pain    2. Chest pain, unspecified type    3. Acute cholecystitis    4. Elevated LFTs        Blue Davey is a 55 year old male who is s/p laparoscopic cholecystectomy with negative IOC on 3/11/2025.  Postoperative course complicated by tachycardia, elevation in T. Bili and alk phos, pancreatitis, ileus picture.  Overall labs have down trended nicely and patient has tolerated low-fat diet since 3/16.  Leukocytosis is downtrending.  As patient is tolerating a diet, continues to have bowel functions and it seems overall his pain is greatly improved since my last visit, he is surgically stable for discharge at this point.    PLAN:  -continue low fat low fiber soft diet for likely 1 week until pain well managed then can transition slowly to regular diet  -Activity as tolerated and encouraged, bowel regimen PRN  -multimodal pain management PO only, minimize narcotic use as able  -pended work note for patient, please print prior discharge  -Patient is requesting home walker if able for stability  -Okay to discharge from surgical standpoint if deemed medically appropriate.  -Discharge recommendations involve instructions placed in AVS.  -Follow up as needed, wrote a work note X2 for patient please print this if able prior to discharge  -Medical management per primary team    SUBJECTIVE:   Blue Davey was seen on rounds.  States he is doing much better than the last time we saw each other, his pain has essentially resolved and he is tolerating a diet.  Continues to have bowel functions.  He is actually ready to go home this time and is eager to discharge.  Denies any new changes overnight.    Patient called his employer/boss while he was in the room and confirmed lifting restrictions and that he was in the hospital.  Will confirm with a communication note as well.    Patient confirms that he uses [facility] in [place]    VITALS RANGE:  Temp:  [97.8  F (36.6  C)-99.1  F (37.3  C)] 98.6  F (37  C)  Pulse:  [] 100  Resp:  [16-18] 18  BP: (130-189)/() 164/91  SpO2:  [96 %-97 %] 97 %    PHYSICAL EXAM:  General: patient seen sitting at edge of bed in no acute distress  Resp: no increased work of breathing, breathing comfortably on room air  Abdomen: Patient is sitting at edge of bed however generally nontender and nondistended abdomen  Extremities: No edema or cyanosis visualized on exam, no obvious deformities    03/16 0700 - 03/17 0659  In: 2045 [P.O.:1820]  Out: -     No results displayed because visit has over 200 results.           [facility]  [facility] General Surgery  [address] [place] [id number]  [facility] [phone]

## 2025-03-17 NOTE — PROGRESS NOTES
Care Management Follow Up    Length of Stay (days): 7    Expected Discharge Date: 03/17/2025    Anticipated Discharge Plan:       Transportation: Anticipate Family/friend    PT Recommendations: home with assist, Per plan established by the PT  OT Recommendations:        Barriers to Discharge: medical stability    Prior Living Situation: apartment with spouse    Discussed  Partnership in Safe Discharge Planning  document with patient/family: No     Handoff Completed: No, handoff not indicated or clinically appropriate    Patient/Spokesperson Updated: No    Additional Information:  Financial counselor following to assist with getting pt on insurance.    No further care management intervention anticipated at this time.  Please re-consult if further needs arise.  Care management signing off.        VIVIAN Damico

## 2025-03-17 NOTE — PLAN OF CARE
Physical Therapy Discharge Summary    Reason for therapy discharge:    Discharged to home.    Progress towards therapy goal(s). See goals on Care Plan in [facility] electronic health record for goal details.  Goals partially met.  Barriers to achieving goals:   discharge from facility.    Therapy recommendation(s):    No further therapy is recommended.      Jacque Bowers, PT, DPT  3/17/2025'

## 2025-03-17 NOTE — PLAN OF CARE
Problem: Adult Inpatient Plan of Care  Goal: Optimal Comfort and Wellbeing  Outcome: Progressing  Intervention: Monitor Pain and Promote Comfort  Recent Flowsheet Documentation  Taken 3/17/2025 0853 by Katty Cruz RN  Pain Management Interventions:   medication (see MAR)   emotional support     Problem: Pain Acute  Goal: Optimal Pain Control and Function  Outcome: Progressing  Intervention: Develop Pain Management Plan  Recent Flowsheet Documentation  Taken 3/17/2025 0853 by Katty Cruz, RN  Pain Management Interventions:   medication (see MAR)   emotional support  Intervention: Prevent or Manage Pain  Recent Flowsheet Documentation  Taken 3/17/2025 1110 by Katty Cruz, RN  Medication Review/Management: medications reviewed  Taken 3/17/2025 0838 by Katty Cruz, RN  Medication Review/Management: medications reviewed     Problem: Surgery Nonspecified  Goal: Absence of Bleeding  Outcome: Progressing  Goal: Effective Bowel Elimination  Outcome: Progressing  Goal: Absence of Infection Signs and Symptoms  Outcome: Progressing  Goal: Nausea and Vomiting Relief  Outcome: Progressing   Goal Outcome Evaluation:       Patient alert and oriented x 4. Rating abdominal pain 7/10. PRN pain medications with Maalox given. Relief reported. Lap sites with no noted complications. Patient up independently in room and discharging once IV antibiotics done and MD note in for work.

## 2025-03-18 ENCOUNTER — APPOINTMENT (OUTPATIENT)
Dept: INTERPRETER SERVICES | Facility: CLINIC | Age: 55
End: 2025-03-18

## 2025-03-18 ENCOUNTER — TELEPHONE (OUTPATIENT)
Dept: SURGERY | Facility: CLINIC | Age: 55
End: 2025-03-18

## 2025-03-18 NOTE — TELEPHONE ENCOUNTER
[facility] Post-Op Phone Call                     Surgeon: Lisette Purvis    Date of Surgery: 3/11/25  Surgery: Laparoscopic Cholecystectomy  Discharge Date: 3/17/25    Date/Time Called:   Date: 3/18/2025 Time: 8:46 AM   Attempt: First    Pain Control:  Intensity: Mild (1 - 3)  Duration/Location/Explain: managed with ibuprofen and tylenol, some abdominal pain and a bloating feeling. Advised to try using a hot pack or ice pack as needed  What makes it better/worse?     Medications:  Narcotic Use - No  Drug type:   Frequency:     Incisions:  Drainage? clean and dry  Any fever type symptoms? Yes, stated that he had a fever after his surgery but does not have a   Comment:     GI:  Nausea? No  Vomiting? No  BM? Yes  Gas? Yes  Voiding Frequency? 4 or more/day   Appetite? Good    Activity:  Walking activity? Yes  Frequency/Type: as tolerated  Restrictions: You should not lift greater than 20 pounds for 2 weeks, and will want to avoid strenuous physical activity for 1-2 weeks    Return to Work Plans?  Expected date 3/24/25  Do you need anything from us in this regard? No , letter already given to patient. Patient does do a lot of manual labor for work. Let him know that he could call us if he does not feel ready to do heavy lifting at his start date.    Post-op appointment made? No          Thank you for your time. Please do not hesitate to call us with any questions or concerns.    Call completed by: Maryam Leach RN

## 2025-03-24 ENCOUNTER — TELEPHONE (OUTPATIENT)
Dept: SURGERY | Facility: CLINIC | Age: 55
End: 2025-03-24

## 2025-03-24 NOTE — LETTER
[facility] [address]  [facility] [address]  [phone]          March 24, 2025    RE:  Blue Davey                                                                                                                                                       [address] [place] [address]            To whom it may concern:    Blue Davey was seen and treated at our hospital 3/11/2025 to 3/17/2025. He may return to work on 3/31/2025 with no restrictions.      Sincerely,      Lisette Purvis MD

## 2025-03-24 NOTE — TELEPHONE ENCOUNTER
Patient is s/p yuliet canales on 3/11 and is calling to speak to a nurse about getting a letter for his employer. He states that he still feels dizzy and can't go back to work yet. Please call him with a [employer]  at [phone]

## 2025-03-24 NOTE — TELEPHONE ENCOUNTER
Spoke with patient with [employer]  and he reports continuing to have dizziness following his hospital stay. No concerns with surgical site. He does not feel ready to return to work at this time and is requesting a letter to return next Monday 3/31. He has a hospital follow-up visit with his primary provider on Friday 3/28.    Work letter written and emailed to [email or web address]    Zenia JIMENEZ RN, BSN    [facility]  General Surgery  [address] [place] [id number]  Office: [phone]  Employed by [facility]

## 2025-07-01 ENCOUNTER — APPOINTMENT (OUTPATIENT)
Dept: RADIOLOGY | Facility: HOSPITAL | Age: 55
End: 2025-07-01
Attending: EMERGENCY MEDICINE
Payer: COMMERCIAL

## 2025-07-01 ENCOUNTER — HOSPITAL ENCOUNTER (INPATIENT)
Facility: HOSPITAL | Age: 55
End: 2025-07-01
Attending: EMERGENCY MEDICINE | Admitting: INTERNAL MEDICINE
Payer: COMMERCIAL

## 2025-07-01 ENCOUNTER — APPOINTMENT (OUTPATIENT)
Dept: CARDIOLOGY | Facility: HOSPITAL | Age: 55
End: 2025-07-01
Attending: INTERNAL MEDICINE
Payer: COMMERCIAL

## 2025-07-01 ENCOUNTER — APPOINTMENT (OUTPATIENT)
Dept: CT IMAGING | Facility: HOSPITAL | Age: 55
End: 2025-07-01
Attending: EMERGENCY MEDICINE
Payer: COMMERCIAL

## 2025-07-01 ENCOUNTER — VIRTUAL VISIT (OUTPATIENT)
Dept: INTERPRETER SERVICES | Facility: CLINIC | Age: 55
End: 2025-07-01

## 2025-07-01 DIAGNOSIS — K92.1 MELENA: ICD-10-CM

## 2025-07-01 DIAGNOSIS — I10 ESSENTIAL (PRIMARY) HYPERTENSION: ICD-10-CM

## 2025-07-01 DIAGNOSIS — N17.9 AKI (ACUTE KIDNEY INJURY): Primary | ICD-10-CM

## 2025-07-01 DIAGNOSIS — R94.31 ABNORMAL ELECTROCARDIOGRAM: ICD-10-CM

## 2025-07-01 DIAGNOSIS — K81.0 ACUTE CHOLECYSTITIS: ICD-10-CM

## 2025-07-01 DIAGNOSIS — R10.84 GENERALIZED ABDOMINAL PAIN: ICD-10-CM

## 2025-07-01 DIAGNOSIS — R51.9 NONINTRACTABLE HEADACHE, UNSPECIFIED CHRONICITY PATTERN, UNSPECIFIED HEADACHE TYPE: ICD-10-CM

## 2025-07-01 DIAGNOSIS — R55 SYNCOPE, UNSPECIFIED SYNCOPE TYPE: ICD-10-CM

## 2025-07-01 DIAGNOSIS — K25.4 GASTROINTESTINAL HEMORRHAGE ASSOCIATED WITH GASTRIC ULCER: ICD-10-CM

## 2025-07-01 DIAGNOSIS — I24.9 ACS (ACUTE CORONARY SYNDROME) (H): ICD-10-CM

## 2025-07-01 DIAGNOSIS — I50.22 CHRONIC SYSTOLIC HEART FAILURE (H): ICD-10-CM

## 2025-07-01 DIAGNOSIS — R07.9 CHEST PAIN, UNSPECIFIED TYPE: ICD-10-CM

## 2025-07-01 LAB
ABO + RH BLD: ABNORMAL
ACT BLD: 239 SECONDS (ref 74–150)
ALBUMIN SERPL BCG-MCNC: 3.2 G/DL (ref 3.5–5.2)
ALBUMIN UR-MCNC: NEGATIVE MG/DL
ALP SERPL-CCNC: 48 U/L (ref 40–150)
ALT SERPL W P-5'-P-CCNC: 22 U/L (ref 0–70)
ANION GAP SERPL CALCULATED.3IONS-SCNC: 6 MMOL/L (ref 7–15)
ANTIBODY UNIDENTIFIED: NORMAL
APPEARANCE UR: CLEAR
APTT PPP: 27 SECONDS (ref 22–38)
AST SERPL W P-5'-P-CCNC: 18 U/L (ref 0–45)
BASOPHILS # BLD AUTO: 0 10E3/UL (ref 0–0.2)
BASOPHILS NFR BLD AUTO: 0 %
BILIRUB DIRECT SERPL-MCNC: 0.1 MG/DL (ref 0–0.3)
BILIRUB SERPL-MCNC: 0.3 MG/DL
BILIRUB UR QL STRIP: NEGATIVE
BLD GP AB SCN SERPL QL: POSITIVE
BLD PROD TYP BPU: NORMAL
BLOOD COMPONENT TYPE: NORMAL
BUN SERPL-MCNC: 58 MG/DL (ref 6–20)
CALCIUM SERPL-MCNC: 8.2 MG/DL (ref 8.8–10.4)
CHLORIDE SERPL-SCNC: 112 MMOL/L (ref 98–107)
CHOLEST SERPL-MCNC: 112 MG/DL
CODING SYSTEM: NORMAL
COLOR UR AUTO: COLORLESS
CREAT SERPL-MCNC: 1.01 MG/DL (ref 0.67–1.17)
CROSSMATCH: NORMAL
D DIMER PPP FEU-MCNC: 0.36 UG/ML FEU (ref 0–0.5)
DAT POLY: NEGATIVE
EGFRCR SERPLBLD CKD-EPI 2021: 88 ML/MIN/1.73M2
EOSINOPHIL # BLD AUTO: 0 10E3/UL (ref 0–0.7)
EOSINOPHIL NFR BLD AUTO: 0 %
ERYTHROCYTE [DISTWIDTH] IN BLOOD BY AUTOMATED COUNT: 15.3 % (ref 10–15)
FERRITIN SERPL-MCNC: 32 NG/ML (ref 31–409)
GLUCOSE SERPL-MCNC: 158 MG/DL (ref 70–99)
GLUCOSE UR STRIP-MCNC: NEGATIVE MG/DL
HAPTOGLOB SERPL-MCNC: 83 MG/DL (ref 30–200)
HCO3 SERPL-SCNC: 22 MMOL/L (ref 22–29)
HCT VFR BLD AUTO: 27.1 % (ref 40–53)
HDLC SERPL-MCNC: 37 MG/DL
HGB BLD-MCNC: 8 G/DL (ref 13.3–17.7)
HGB BLD-MCNC: 8.7 G/DL (ref 13.3–17.7)
HGB BLD-MCNC: 9.7 G/DL (ref 13.3–17.7)
HGB UR QL STRIP: NEGATIVE
HOLD SPECIMEN: NORMAL
IMM GRANULOCYTES # BLD: 0.1 10E3/UL
IMM GRANULOCYTES NFR BLD: 1 %
INR PPP: 1.06 (ref 0.85–1.15)
IRON BINDING CAPACITY (ROCHE): 240 UG/DL (ref 240–430)
IRON SATN MFR SERPL: 15 % (ref 15–46)
IRON SERPL-MCNC: 36 UG/DL (ref 61–157)
ISSUE DATE AND TIME: NORMAL
ISSUE DATE AND TIME: NORMAL
KETONES UR STRIP-MCNC: NEGATIVE MG/DL
LDH SERPL L TO P-CCNC: 192 U/L (ref 0–250)
LDLC SERPL CALC-MCNC: 63 MG/DL
LEUKOCYTE ESTERASE UR QL STRIP: NEGATIVE
LIPASE SERPL-CCNC: 36 U/L (ref 13–60)
LVEF ECHO: NORMAL
LYMPHOCYTES # BLD AUTO: 0.8 10E3/UL (ref 0.8–5.3)
LYMPHOCYTES NFR BLD AUTO: 7 %
MCH RBC QN AUTO: 28.6 PG (ref 26.5–33)
MCHC RBC AUTO-ENTMCNC: 32.1 G/DL (ref 31.5–36.5)
MCV RBC AUTO: 89 FL (ref 78–100)
MCV RBC AUTO: 89 FL (ref 78–100)
MCV RBC AUTO: 90 FL (ref 78–100)
MONOCYTES # BLD AUTO: 0.5 10E3/UL (ref 0–1.3)
MONOCYTES NFR BLD AUTO: 4 %
NEUTROPHILS # BLD AUTO: 10.8 10E3/UL (ref 1.6–8.3)
NEUTROPHILS NFR BLD AUTO: 89 %
NITRATE UR QL: NEGATIVE
NONHDLC SERPL-MCNC: 75 MG/DL
NRBC # BLD AUTO: 0 10E3/UL
NRBC BLD AUTO-RTO: 0 /100
PH UR STRIP: 5.5 [PH] (ref 5–7)
PLATELET # BLD AUTO: 186 10E3/UL (ref 150–450)
POTASSIUM SERPL-SCNC: 4.2 MMOL/L (ref 3.4–5.3)
PROT SERPL-MCNC: 5.2 G/DL (ref 6.4–8.3)
PROTHROMBIN TIME: 14.1 SECONDS (ref 11.8–14.8)
RBC # BLD AUTO: 3.04 10E6/UL (ref 4.4–5.9)
RBC URINE: 1 /HPF
RETICS # AUTO: 0.07 10E6/UL (ref 0.03–0.1)
RETICS/RBC NFR AUTO: 2.3 % (ref 0.5–2)
SODIUM SERPL-SCNC: 140 MMOL/L (ref 135–145)
SP GR UR STRIP: 1.02 (ref 1–1.03)
SPECIMEN EXP DATE BLD: ABNORMAL
SPECIMEN EXP DATE BLD: NORMAL
SPECIMEN EXP DATE BLD: NORMAL
TRIGL SERPL-MCNC: 61 MG/DL
TROPONIN T SERPL HS-MCNC: 10 NG/L
TROPONIN T SERPL HS-MCNC: 11 NG/L
UNIT ABO/RH: NORMAL
UNIT NUMBER: NORMAL
UNIT STATUS: NORMAL
UNIT TYPE ISBT: 6200
UPPER GI ENDOSCOPY: NORMAL
UROBILINOGEN UR STRIP-MCNC: NORMAL MG/DL
WBC # BLD AUTO: 12.2 10E3/UL (ref 4–11)
WBC URINE: 2 /HPF

## 2025-07-01 PROCEDURE — 99207 PR APP CREDIT; MD BILLING SHARED VISIT: CPT | Mod: FS

## 2025-07-01 PROCEDURE — 36415 COLL VENOUS BLD VENIPUNCTURE: CPT | Performed by: INTERNAL MEDICINE

## 2025-07-01 PROCEDURE — 86870 RBC ANTIBODY IDENTIFICATION: CPT | Performed by: EMERGENCY MEDICINE

## 2025-07-01 PROCEDURE — 93458 L HRT ARTERY/VENTRICLE ANGIO: CPT | Performed by: INTERNAL MEDICINE

## 2025-07-01 PROCEDURE — 83550 IRON BINDING TEST: CPT | Performed by: HOSPITALIST

## 2025-07-01 PROCEDURE — 0W3P8ZZ CONTROL BLEEDING IN GASTROINTESTINAL TRACT, VIA NATURAL OR ARTIFICIAL OPENING ENDOSCOPIC: ICD-10-PCS | Performed by: INTERNAL MEDICINE

## 2025-07-01 PROCEDURE — 43235 EGD DIAGNOSTIC BRUSH WASH: CPT | Performed by: INTERNAL MEDICINE

## 2025-07-01 PROCEDURE — 250N000009 HC RX 250: Performed by: INTERNAL MEDICINE

## 2025-07-01 PROCEDURE — 82310 ASSAY OF CALCIUM: CPT | Performed by: EMERGENCY MEDICINE

## 2025-07-01 PROCEDURE — 250N000011 HC RX IP 250 OP 636: Performed by: EMERGENCY MEDICINE

## 2025-07-01 PROCEDURE — 0DB68ZX EXCISION OF STOMACH, VIA NATURAL OR ARTIFICIAL OPENING ENDOSCOPIC, DIAGNOSTIC: ICD-10-PCS | Performed by: INTERNAL MEDICINE

## 2025-07-01 PROCEDURE — 4A023N7 MEASUREMENT OF CARDIAC SAMPLING AND PRESSURE, LEFT HEART, PERCUTANEOUS APPROACH: ICD-10-PCS | Performed by: INTERNAL MEDICINE

## 2025-07-01 PROCEDURE — 255N000002 HC RX 255 OP 636: Performed by: INTERNAL MEDICINE

## 2025-07-01 PROCEDURE — C1769 GUIDE WIRE: HCPCS | Performed by: INTERNAL MEDICINE

## 2025-07-01 PROCEDURE — 88305 TISSUE EXAM BY PATHOLOGIST: CPT | Mod: 26

## 2025-07-01 PROCEDURE — 93458 L HRT ARTERY/VENTRICLE ANGIO: CPT | Mod: 26 | Performed by: INTERNAL MEDICINE

## 2025-07-01 PROCEDURE — 36415 COLL VENOUS BLD VENIPUNCTURE: CPT | Performed by: EMERGENCY MEDICINE

## 2025-07-01 PROCEDURE — 82465 ASSAY BLD/SERUM CHOLESTEROL: CPT | Performed by: INTERNAL MEDICINE

## 2025-07-01 PROCEDURE — 85379 FIBRIN DEGRADATION QUANT: CPT | Performed by: EMERGENCY MEDICINE

## 2025-07-01 PROCEDURE — 86880 COOMBS TEST DIRECT: CPT | Performed by: HOSPITALIST

## 2025-07-01 PROCEDURE — 258N000003 HC RX IP 258 OP 636: Performed by: HOSPITALIST

## 2025-07-01 PROCEDURE — G0500 MOD SEDAT ENDO SERVICE >5YRS: HCPCS | Performed by: INTERNAL MEDICINE

## 2025-07-01 PROCEDURE — 70450 CT HEAD/BRAIN W/O DYE: CPT

## 2025-07-01 PROCEDURE — 250N000011 HC RX IP 250 OP 636: Performed by: INTERNAL MEDICINE

## 2025-07-01 PROCEDURE — 88342 IMHCHEM/IMCYTCHM 1ST ANTB: CPT | Mod: 26

## 2025-07-01 PROCEDURE — 88342 IMHCHEM/IMCYTCHM 1ST ANTB: CPT | Mod: TC | Performed by: INTERNAL MEDICINE

## 2025-07-01 PROCEDURE — 272N000001 HC OR GENERAL SUPPLY STERILE: Performed by: INTERNAL MEDICINE

## 2025-07-01 PROCEDURE — 85610 PROTHROMBIN TIME: CPT | Performed by: EMERGENCY MEDICINE

## 2025-07-01 PROCEDURE — 99291 CRITICAL CARE FIRST HOUR: CPT | Mod: 25

## 2025-07-01 PROCEDURE — 85730 THROMBOPLASTIN TIME PARTIAL: CPT | Performed by: EMERGENCY MEDICINE

## 2025-07-01 PROCEDURE — B211YZZ FLUOROSCOPY OF MULTIPLE CORONARY ARTERIES USING OTHER CONTRAST: ICD-10-PCS | Performed by: INTERNAL MEDICINE

## 2025-07-01 PROCEDURE — 93306 TTE W/DOPPLER COMPLETE: CPT

## 2025-07-01 PROCEDURE — T1013 SIGN LANG/ORAL INTERPRETER: HCPCS | Mod: GT,TEL,95 | Performed by: INTERPRETER

## 2025-07-01 PROCEDURE — 84484 ASSAY OF TROPONIN QUANT: CPT | Performed by: EMERGENCY MEDICINE

## 2025-07-01 PROCEDURE — 99233 SBSQ HOSP IP/OBS HIGH 50: CPT | Mod: FS | Performed by: INTERNAL MEDICINE

## 2025-07-01 PROCEDURE — 85045 AUTOMATED RETICULOCYTE COUNT: CPT | Performed by: HOSPITALIST

## 2025-07-01 PROCEDURE — 82728 ASSAY OF FERRITIN: CPT | Performed by: HOSPITALIST

## 2025-07-01 PROCEDURE — 82248 BILIRUBIN DIRECT: CPT | Performed by: EMERGENCY MEDICINE

## 2025-07-01 PROCEDURE — 83010 ASSAY OF HAPTOGLOBIN QUANT: CPT | Performed by: HOSPITALIST

## 2025-07-01 PROCEDURE — 84484 ASSAY OF TROPONIN QUANT: CPT | Performed by: INTERNAL MEDICINE

## 2025-07-01 PROCEDURE — 120N000004 HC R&B MS OVERFLOW

## 2025-07-01 PROCEDURE — 81001 URINALYSIS AUTO W/SCOPE: CPT | Performed by: HOSPITALIST

## 2025-07-01 PROCEDURE — 85060 BLOOD SMEAR INTERPRETATION: CPT | Performed by: PATHOLOGY

## 2025-07-01 PROCEDURE — 85025 COMPLETE CBC W/AUTO DIFF WBC: CPT | Performed by: EMERGENCY MEDICINE

## 2025-07-01 PROCEDURE — 71045 X-RAY EXAM CHEST 1 VIEW: CPT

## 2025-07-01 PROCEDURE — 86900 BLOOD TYPING SEROLOGIC ABO: CPT | Performed by: EMERGENCY MEDICINE

## 2025-07-01 PROCEDURE — 96374 THER/PROPH/DIAG INJ IV PUSH: CPT

## 2025-07-01 PROCEDURE — 99223 1ST HOSP IP/OBS HIGH 75: CPT | Performed by: HOSPITALIST

## 2025-07-01 PROCEDURE — C1887 CATHETER, GUIDING: HCPCS | Performed by: INTERNAL MEDICINE

## 2025-07-01 PROCEDURE — 93306 TTE W/DOPPLER COMPLETE: CPT | Mod: 26 | Performed by: INTERNAL MEDICINE

## 2025-07-01 PROCEDURE — 258N000003 HC RX IP 258 OP 636: Performed by: INTERNAL MEDICINE

## 2025-07-01 PROCEDURE — 250N000013 HC RX MED GY IP 250 OP 250 PS 637: Performed by: EMERGENCY MEDICINE

## 2025-07-01 PROCEDURE — T1013 SIGN LANG/ORAL INTERPRETER: HCPCS | Mod: GT,TEL,95

## 2025-07-01 PROCEDURE — 85018 HEMOGLOBIN: CPT | Performed by: INTERNAL MEDICINE

## 2025-07-01 PROCEDURE — 250N000013 HC RX MED GY IP 250 OP 250 PS 637: Performed by: INTERNAL MEDICINE

## 2025-07-01 PROCEDURE — 83615 LACTATE (LD) (LDH) ENZYME: CPT | Performed by: HOSPITALIST

## 2025-07-01 PROCEDURE — 85347 COAGULATION TIME ACTIVATED: CPT

## 2025-07-01 PROCEDURE — 250N000011 HC RX IP 250 OP 636: Performed by: HOSPITALIST

## 2025-07-01 PROCEDURE — 86922 COMPATIBILITY TEST ANTIGLOB: CPT | Performed by: INTERNAL MEDICINE

## 2025-07-01 PROCEDURE — B215YZZ FLUOROSCOPY OF LEFT HEART USING OTHER CONTRAST: ICD-10-PCS | Performed by: INTERNAL MEDICINE

## 2025-07-01 PROCEDURE — 83690 ASSAY OF LIPASE: CPT | Performed by: EMERGENCY MEDICINE

## 2025-07-01 PROCEDURE — P9016 RBC LEUKOCYTES REDUCED: HCPCS | Performed by: INTERNAL MEDICINE

## 2025-07-01 PROCEDURE — 99223 1ST HOSP IP/OBS HIGH 75: CPT | Mod: 25 | Performed by: INTERNAL MEDICINE

## 2025-07-01 PROCEDURE — C1894 INTRO/SHEATH, NON-LASER: HCPCS | Performed by: INTERNAL MEDICINE

## 2025-07-01 RX ORDER — PANTOPRAZOLE SODIUM 40 MG/1
40 TABLET, DELAYED RELEASE ORAL DAILY
Status: ACTIVE | OUTPATIENT
Start: 2025-07-01

## 2025-07-01 RX ORDER — OXYCODONE HYDROCHLORIDE 5 MG/1
10 TABLET ORAL EVERY 4 HOURS PRN
Status: ACTIVE | OUTPATIENT
Start: 2025-07-01

## 2025-07-01 RX ORDER — NALOXONE HYDROCHLORIDE 0.4 MG/ML
0.4 INJECTION, SOLUTION INTRAMUSCULAR; INTRAVENOUS; SUBCUTANEOUS
Status: ACTIVE | OUTPATIENT
Start: 2025-07-01

## 2025-07-01 RX ORDER — ONDANSETRON 2 MG/ML
INJECTION INTRAMUSCULAR; INTRAVENOUS
Status: COMPLETED
Start: 2025-07-01 | End: 2025-07-01

## 2025-07-01 RX ORDER — NALOXONE HYDROCHLORIDE 0.4 MG/ML
0.4 INJECTION, SOLUTION INTRAMUSCULAR; INTRAVENOUS; SUBCUTANEOUS
Status: DISCONTINUED | OUTPATIENT
Start: 2025-07-01 | End: 2025-07-03

## 2025-07-01 RX ORDER — EPINEPHRINE 1 MG/ML
INJECTION, SOLUTION, CONCENTRATE INTRAVENOUS PRN
OUTPATIENT
Start: 2025-07-01

## 2025-07-01 RX ORDER — METOPROLOL SUCCINATE 50 MG/1
50 TABLET, EXTENDED RELEASE ORAL DAILY
Status: DISCONTINUED | OUTPATIENT
Start: 2025-07-01 | End: 2025-07-01

## 2025-07-01 RX ORDER — NITROGLYCERIN 0.4 MG/1
0.4 TABLET SUBLINGUAL EVERY 5 MIN PRN
Status: ACTIVE | OUTPATIENT
Start: 2025-07-01

## 2025-07-01 RX ORDER — ATORVASTATIN CALCIUM 40 MG/1
40 TABLET, FILM COATED ORAL DAILY
Qty: 90 TABLET | Refills: 3 | Status: SHIPPED | OUTPATIENT
Start: 2025-07-01

## 2025-07-01 RX ORDER — ACETAMINOPHEN 325 MG/1
650 TABLET ORAL EVERY 4 HOURS PRN
Status: DISPENSED | OUTPATIENT
Start: 2025-07-01

## 2025-07-01 RX ORDER — NALOXONE HYDROCHLORIDE 0.4 MG/ML
0.2 INJECTION, SOLUTION INTRAMUSCULAR; INTRAVENOUS; SUBCUTANEOUS
Status: ACTIVE | OUTPATIENT
Start: 2025-07-01

## 2025-07-01 RX ORDER — ONDANSETRON 2 MG/ML
4 INJECTION INTRAMUSCULAR; INTRAVENOUS EVERY 6 HOURS PRN
Status: ACTIVE | OUTPATIENT
Start: 2025-07-01

## 2025-07-01 RX ORDER — SODIUM CHLORIDE 9 MG/ML
INJECTION, SOLUTION INTRAVENOUS CONTINUOUS
Status: ACTIVE | OUTPATIENT
Start: 2025-07-01 | End: 2025-07-01

## 2025-07-01 RX ORDER — ASPIRIN 81 MG/1
324 TABLET, CHEWABLE ORAL ONCE
Status: COMPLETED | OUTPATIENT
Start: 2025-07-01 | End: 2025-07-01

## 2025-07-01 RX ORDER — TICAGRELOR 90 MG/1
180 TABLET, FILM COATED ORAL ONCE
Status: COMPLETED | OUTPATIENT
Start: 2025-07-01 | End: 2025-07-01

## 2025-07-01 RX ORDER — EPINEPHRINE 1 MG/ML
0.1 INJECTION, SOLUTION INTRAMUSCULAR; SUBCUTANEOUS
Status: ACTIVE | OUTPATIENT
Start: 2025-07-01

## 2025-07-01 RX ORDER — ATROPINE SULFATE 0.1 MG/ML
1 INJECTION INTRAVENOUS
Status: ACTIVE | OUTPATIENT
Start: 2025-07-01

## 2025-07-01 RX ORDER — ATROPINE SULFATE 0.1 MG/ML
0.5 INJECTION INTRAVENOUS
Status: ACTIVE | OUTPATIENT
Start: 2025-07-01 | End: 2025-07-01

## 2025-07-01 RX ORDER — FENTANYL CITRATE 50 UG/ML
INJECTION, SOLUTION INTRAMUSCULAR; INTRAVENOUS PRN
OUTPATIENT
Start: 2025-07-01

## 2025-07-01 RX ORDER — PROCHLORPERAZINE 25 MG
25 SUPPOSITORY, RECTAL RECTAL EVERY 12 HOURS PRN
Status: ACTIVE | OUTPATIENT
Start: 2025-07-01

## 2025-07-01 RX ORDER — FLUMAZENIL 0.1 MG/ML
0.2 INJECTION, SOLUTION INTRAVENOUS
Status: ACTIVE | OUTPATIENT
Start: 2025-07-01 | End: 2025-07-02

## 2025-07-01 RX ORDER — SIMETHICONE 40MG/0.6ML
133 SUSPENSION, DROPS(FINAL DOSAGE FORM)(ML) ORAL
Status: ACTIVE | OUTPATIENT
Start: 2025-07-01

## 2025-07-01 RX ORDER — NALOXONE HYDROCHLORIDE 0.4 MG/ML
0.2 INJECTION, SOLUTION INTRAMUSCULAR; INTRAVENOUS; SUBCUTANEOUS
Status: DISCONTINUED | OUTPATIENT
Start: 2025-07-01 | End: 2025-07-03

## 2025-07-01 RX ORDER — FENTANYL CITRATE 50 UG/ML
25 INJECTION, SOLUTION INTRAMUSCULAR; INTRAVENOUS
Status: DISCONTINUED | OUTPATIENT
Start: 2025-07-01 | End: 2025-07-01

## 2025-07-01 RX ORDER — OXYCODONE HYDROCHLORIDE 5 MG/1
5 TABLET ORAL EVERY 4 HOURS PRN
Status: ACTIVE | OUTPATIENT
Start: 2025-07-01

## 2025-07-01 RX ORDER — SODIUM CHLORIDE 9 MG/ML
75 INJECTION, SOLUTION INTRAVENOUS CONTINUOUS
Status: DISCONTINUED | OUTPATIENT
Start: 2025-07-01 | End: 2025-07-01

## 2025-07-01 RX ORDER — HYDRALAZINE HYDROCHLORIDE 20 MG/ML
10 INJECTION INTRAMUSCULAR; INTRAVENOUS
Status: DISCONTINUED | OUTPATIENT
Start: 2025-07-01 | End: 2025-07-02

## 2025-07-01 RX ORDER — FLUMAZENIL 0.1 MG/ML
0.2 INJECTION, SOLUTION INTRAVENOUS
Status: ACTIVE | OUTPATIENT
Start: 2025-07-01

## 2025-07-01 RX ORDER — DIPHENHYDRAMINE HYDROCHLORIDE 50 MG/ML
25-50 INJECTION, SOLUTION INTRAMUSCULAR; INTRAVENOUS
Status: ACTIVE | OUTPATIENT
Start: 2025-07-01

## 2025-07-01 RX ORDER — PROCHLORPERAZINE MALEATE 10 MG
10 TABLET ORAL EVERY 6 HOURS PRN
Status: ACTIVE | OUTPATIENT
Start: 2025-07-01

## 2025-07-01 RX ORDER — FENTANYL CITRATE 50 UG/ML
25-100 INJECTION, SOLUTION INTRAMUSCULAR; INTRAVENOUS EVERY 5 MIN PRN
Refills: 0 | Status: ACTIVE | OUTPATIENT
Start: 2025-07-01

## 2025-07-01 RX ORDER — ATORVASTATIN CALCIUM 40 MG/1
40 TABLET, FILM COATED ORAL DAILY
Status: DISPENSED | OUTPATIENT
Start: 2025-07-01

## 2025-07-01 RX ADMIN — ONDANSETRON 4 MG: 2 INJECTION INTRAMUSCULAR; INTRAVENOUS at 09:24

## 2025-07-01 RX ADMIN — ACETAMINOPHEN 650 MG: 325 TABLET ORAL at 04:48

## 2025-07-01 RX ADMIN — TICAGRELOR 180 MG: 90 TABLET ORAL at 02:59

## 2025-07-01 RX ADMIN — PANTOPRAZOLE SODIUM 40 MG: 40 INJECTION, POWDER, FOR SOLUTION INTRAVENOUS at 06:31

## 2025-07-01 RX ADMIN — ASPIRIN 81 MG CHEWABLE TABLET 324 MG: 81 TABLET CHEWABLE at 02:59

## 2025-07-01 RX ADMIN — SODIUM CHLORIDE: 9 INJECTION, SOLUTION INTRAVENOUS at 06:28

## 2025-07-01 RX ADMIN — SODIUM CHLORIDE, SODIUM LACTATE, POTASSIUM CHLORIDE, AND CALCIUM CHLORIDE 1000 ML: .6; .31; .03; .02 INJECTION, SOLUTION INTRAVENOUS at 09:30

## 2025-07-01 RX ADMIN — PANTOPRAZOLE SODIUM 40 MG: 40 INJECTION, POWDER, FOR SOLUTION INTRAVENOUS at 20:06

## 2025-07-01 RX ADMIN — SODIUM CHLORIDE 75 ML/HR: 9 INJECTION, SOLUTION INTRAVENOUS at 04:19

## 2025-07-01 RX ADMIN — NITROGLYCERIN 0.4 MG: 0.4 TABLET, ORALLY DISINTEGRATING SUBLINGUAL at 03:10

## 2025-07-01 RX ADMIN — ONDANSETRON 4 MG: 2 INJECTION, SOLUTION INTRAMUSCULAR; INTRAVENOUS at 09:24

## 2025-07-01 RX ADMIN — ATORVASTATIN CALCIUM 40 MG: 40 TABLET, FILM COATED ORAL at 20:03

## 2025-07-01 RX ADMIN — HEPARIN SODIUM 4500 UNITS: 1000 INJECTION, SOLUTION INTRAVENOUS; SUBCUTANEOUS at 03:02

## 2025-07-01 ASSESSMENT — ACTIVITIES OF DAILY LIVING (ADL)
ADLS_ACUITY_SCORE: 35
ADLS_ACUITY_SCORE: 36
ADLS_ACUITY_SCORE: 35
ADLS_ACUITY_SCORE: 38
ADLS_ACUITY_SCORE: 59
ADLS_ACUITY_SCORE: 36
ADLS_ACUITY_SCORE: 38
ADLS_ACUITY_SCORE: 59
ADLS_ACUITY_SCORE: 36
ADLS_ACUITY_SCORE: 35
ADLS_ACUITY_SCORE: 61
ADLS_ACUITY_SCORE: 35
ADLS_ACUITY_SCORE: 38
ADLS_ACUITY_SCORE: 59
ADLS_ACUITY_SCORE: 35
ADLS_ACUITY_SCORE: 36

## 2025-07-01 ASSESSMENT — EJECTION FRACTION: EF_VALUE: .18

## 2025-07-01 NOTE — ED TRIAGE NOTES
Pt arrives via EMS from home. EMS reports that the pt was feeling weak today and had a syncopal episode. EMS reports that the pt is hmong speaking, another family member helped to translate with minimal information. BG for ; EKG unremarkable for EMS. EMS noted pt did have a facial droop to right lower face/mouth; pt's family reports that is not new and baseline.    Pt's wife is at bedside and reports that she did not see the pt fall but heard the pt fall and went to check on the pt.     Triage Assessment (Adult)       Row Name 07/01/25 0247          Triage Assessment    Airway WDL WDL        Respiratory WDL    Respiratory WDL WDL        Cardiac WDL    Cardiac WDL X;chest pain        Chest Pain Assessment    Chest Pain Location midsternal     Character pressure;sharp     Precipitating Factors at rest     Associated Signs/Symptoms syncope

## 2025-07-01 NOTE — ED PROVIDER NOTES
EMERGENCY DEPARTMENT ENCOUNTER      NAME: Blue Davey  AGE: 55 year old male  YOB: 1970  MRN: [id number]  EVALUATION DATE & TIME: 7/1/2025  2:27 AM    PCP: [facility]    ED PROVIDER: Mitch Bronson MD      Chief Complaint   Patient presents with    Fall    Generalized Weakness         FINAL IMPRESSION:      (R07.9) Chest pain, unspecified type  (R55) Syncope, unspecified syncope type  (R94.31) Abnormal electrocardiogram  R51.9) Nonintractable headache, unspecified chronicity pattern, unspecified headache         ED COURSE & MEDICAL DECISION MAKING:    Pertinent Labs & Imaging studies reviewed. (See chart for details)  55 year old male presents to the Emergency Department for evaluation of pain, syncope, reports he is has shortness of breath and chest heaviness.  Reports he fell hit his head.    EKG shortly after arrival shows STEMI.  STEMI activation initiated    Worst headache of life.  No history of chest pain.  Denies any heart problems.  Does a history of high blood pressure and high cholesterol however    DDX seizure, subarachnoid hemorrhage, ruptured abdominal aortic aneurysm, aortic dissection, perforated gastric/duodenal ulcer, ruptured ectopic pregnancy, stroke/TIA, acutecoronary syndrome, pulmonary embolism, arrhythmia (atrioventricular block/symptomatic bradycardia, Mary-Parkinson-White syndrome, Brugada syndrome, hypertrophic cardiomyopathy, long QT syndrome, arrhythmogenic rightventricular dysplasia), aortic stenosis, hypoglycemia, vasovagal, or orthostatic hypotension.     EKG read as STEMI.  Does have new changes compared to most recent EKG in March.  Reports chest heaviness and shortness of breath.  Plan for CT head since he fell hit his head and reports worst headache of life prior to aspirin and Brilinta and heparin    Reviewed x-ray did not note any widening the mediastinum therefore will give heparin once I review his CT head.     CT head without intercranial hemorrhage  per my read therefore given aspirin Brilinta heparin for STEMI on EKG syncope and chest pressure and shortness of breath    Given nitroglycerin for chest pain.  Did not improve chest pain but did cause some mild hypotension    Chest x-ray does not show pneumothorax but does show an enlarged heart    STEMI labs ordered, normal D-dimer makes PE unlikely    Patient transported to Cath Lab prior to completion of all laboratory assessment        ED Course as of 07/01/25 0444   Tue Jul 01, 2025   0244 With chest pain, syncope, EKG that is reading STEMI.  Fortunately hit his head and has worst headache of his life.  Will hold on aspirin until we get a quick CT Noncon to exclude subarachnoid hemorrhage.   0245 Doubt pulmonary emboli.  Doubt dissection   0250 Syncope hitting his head and reports of worst headache of life will need to rule out subarachnoid hemorrhage prior to antiplatelets or anticoagulation   0250 STEMI activation based on EKG and chest pressure       2:33 AM I met with the patient, obtained history, performed an initial exam, and discussed options and plan for diagnostics and treatment here in the ED.   2:35 AM Level 1 STEMI activation.  3:27 AM I spoke with the cath lab team.     Medical Decision Making  Care impacted by Hypertension  Admit.    MIPS (CTPE, Dental pain, Hercules, Sinusitis, Asthma/COPD, Head Trauma): Adult Minor Head Trauma:Severe headache    SEPSIS: None          At the conclusion of the encounter I discussed the results of all of the tests and the disposition. The questions were answered. The patient or family acknowledged understanding and was agreeable with the care plan.     35 minutes of critical care time     Voice recognition software used for this note,  any grammatical or spelling errors are non-intentional. Please contact the author of this note directly if you are in need of any clarification.      MEDICATIONS GIVEN IN THE EMERGENCY:  Medications   nitroGLYcerin (NITROSTAT)  sublingual tablet 0.4 mg (0.4 mg Sublingual $Given 7/1/25 0310)   pantoprazole (PROTONIX) EC tablet 40 mg (has no administration in time range)   sodium chloride 0.9% BOLUS 250 mL (has no administration in time range)   atropine injection 0.5 mg (has no administration in time range)   fentaNYL (PF) (SUBLIMAZE) injection 25 mcg (has no administration in time range)   midazolam (VERSED) injection 0.5 mg (has no administration in time range)   naloxone (NARCAN) injection 0.2 mg (has no administration in time range)     Or   naloxone (NARCAN) injection 0.4 mg (has no administration in time range)     Or   naloxone (NARCAN) injection 0.2 mg (has no administration in time range)     Or   naloxone (NARCAN) injection 0.4 mg (has no administration in time range)   flumazenil (ROMAZICON) injection 0.2 mg (has no administration in time range)   HOLD:  Metformin and metformin containing medications if patient received IV contrast with acute kidney injury or severe chronic kidney disease (stage IV or stage V; i.e., eGFR less than 30) (has no administration in time range)   sodium chloride 0.9 % infusion (75 mL/hr Intravenous $New Bag 7/1/25 0798)   hydrALAZINE (APRESOLINE) injection 10 mg (has no administration in time range)   acetaminophen (TYLENOL) tablet 650 mg (has no administration in time range)   oxyCODONE (ROXICODONE) tablet 5 mg (has no administration in time range)     Or   oxyCODONE (ROXICODONE) tablet 10 mg (has no administration in time range)   atorvastatin (LIPITOR) tablet 40 mg (has no administration in time range)   aspirin (ASA) chewable tablet 324 mg (324 mg Oral $Given 7/1/25 0259)   ticagrelor (BRILINTA) tablet 180 mg (180 mg Oral $Given 7/1/25 0259)   heparin (porcine) injection 1000 units/mL (rounds in 500 unit increments) (4,500 Units Intravenous $Given 7/1/25 0302)       NEW PRESCRIPTIONS STARTED AT TODAY'S ER VISIT  Current Discharge Medication List        START taking these medications    Details    atorvastatin (LIPITOR) 40 MG tablet Take 1 tablet (40 mg) by mouth daily.  Qty: 90 tablet, Refills: 3    Associated Diagnoses: POLLY (acute kidney injury); Chest pain, unspecified type; Acute cholecystitis                =================================================================    TRIAGE ASSESSMENT:        HPI    Patient information was obtained from: the patient and EMS    Use of : Yes (In-person). Language: Carline Davey is a 55 year old male with a pertinent history of hypertension who presents to this ED by EMS for evaluation of chest pain and a headache.    The patient developed pain in the middle of his chest at 11:00 PM last night (6/30/2025). He has not experienced chest pain in the past. At around the same time, he felt weak and suddenly lost consciousness. He did hit his head. He now endorses some chest pressure, generalized weakness and a 7/10 headache. He reports that this is the worst headache that he has ever had. The patient did not take aspirin at home this morning. He is feeling mildly nauseous. Per EMS, the patient's family reports that the patient's right-sided facial droop is not new.    Of note, the patient underwent a cholecystectomy in March. No history of eye problems.     Per Chart Review, the patient was seen on 3/28/2025 at [facility] for a hospital follow-up appointment. He underwent a cholecystectomy on 3/11/2025. He was hospitalized on 3/17/2025 and received IV antibiotics during his stay. He was found to have POLLY, elevated LFTs and pancreatitis as well. He reported compliance with his new antihypertensive medication regimen (amlodipine and metoprolol), but had exhausted his current supply. A refill was sent.     Per Chart Review, the patient was seen admitted to [facility] from 3/10/2025-3/17/2025 abdominal pain and abnormal LFTs. He was later found to have acute cholecystitis. He was placed on IV antibiotics on admission and underwent  a cholecystectomy on 3/11/2025 without any incidence. Postoperatively, he developed fever and persistent abdominal pain, which was attributed to pancreatitis. MRCP showed common bile duct dilatation up to 7 mm without choledocholithiasis. Blood culture was negative after four days. He completed a seven-day course of IV Zosyn during his hospital stay. Post-operative course was complicated by acute kidney injury likely prerenal secondary to acute pancreatitis.  Acute pancreatitis improved with IV hydration and analgesics. POLLY has resolved. Antihypertensives were initiated during hospital stay due to uncontrolled hypertension.      REVIEW OF SYSTEMS   Review of Systems   See the HPI.     PAST MEDICAL HISTORY:  No past medical history on file.    PAST SURGICAL HISTORY:  Past Surgical History:   Procedure Laterality Date    CHOLANGIOGRAM N/A 3/11/2025    Procedure: with Intra-operative cholangiograms;  Surgeon: Lisette Purvis MD;  Location: [facility] OR           CURRENT MEDICATIONS:    No current outpatient medications on file.      ALLERGIES:  No Known Allergies    FAMILY HISTORY:  No family history on file.    SOCIAL HISTORY:   Social History     Socioeconomic History    Marital status:    Tobacco Use    Smoking status: Never    Smokeless tobacco: Never   Vaping Use    Vaping status: Never Used   Substance and Sexual Activity    Alcohol use: No     Social Drivers of Health     Financial Resource Strain: Low Risk  (3/11/2025)    Financial Resource Strain     Within the past 12 months, have you or your family members you live with been unable to get utilities (heat, electricity) when it was really needed?: No   Food Insecurity: Low Risk  (3/11/2025)    Food Insecurity     Within the past 12 months, did you worry that your food would run out before you got money to buy more?: No     Within the past 12 months, did the food you bought just not last and you didn t have money to get more?: No   Transportation  "Needs: Low Risk  (3/11/2025)    Transportation Needs     Within the past 12 months, has lack of transportation kept you from medical appointments, getting your medicines, non-medical meetings or appointments, work, or from getting things that you need?: No   Interpersonal Safety: Low Risk  (3/11/2025)    Interpersonal Safety     Do you feel physically and emotionally safe where you currently live?: Yes     Within the past 12 months, have you been hit, slapped, kicked or otherwise physically hurt by someone?: No     Within the past 12 months, have you been humiliated or emotionally abused in other ways by your partner or ex-partner?: No   Housing Stability: Low Risk  (3/11/2025)    Housing Stability     Do you have housing? : Yes     Are you worried about losing your housing?: No       VITALS:  /78   Pulse 95   Temp 98.5  F (36.9  C) (Oral)   Resp 21   Ht 1.575 m (5' 2\")   Wt 61.4 kg (135 lb 4.8 oz)   SpO2 98%   BMI 24.75 kg/m      PHYSICAL EXAM      Vitals: /78   Pulse 95   Temp 98.5  F (36.9  C) (Oral)   Resp 21   Ht 1.575 m (5' 2\")   Wt 61.4 kg (135 lb 4.8 oz)   SpO2 98%   BMI 24.75 kg/m    General: Appears in no acute distress, awake, alert, interactive.  Eyes: Conjunctivae non-injected. Sclera anicteric.  HENT: Atraumatic.  Right-sided facial droop (chronic per family)  Neck: Supple.  Respiratory/Chest: Respiration unlabored.  Bilateral breath.  2+ radial pulses and dorsal pedal pulse  Abdomen: non distended, no abdominal tenderness  Musculoskeletal: Normal extremities. No edema or erythema.  Skin: Normal color. No rash or diaphoresis.  Neurologic: Face symmetric, moves all extremities spontaneously. Speech clear.  Psychiatric: Oriented to person, place, and time. Affect appropriate.        LAB:  All pertinent labs reviewed and interpreted.  Results for orders placed or performed during the hospital encounter of 07/01/25   Head CT w/o contrast    Impression    IMPRESSION:  1.  No CT " evidence for acute intracranial process.  2.  Brain atrophy and presumed chronic microvascular ischemic changes as above.     XR Chest Port 1 View    Impression    IMPRESSION: No acute abnormality.   INR   Result Value Ref Range    INR 1.06 0.85 - 1.15    PT 14.1 11.8 - 14.8 Seconds   Partial thromboplastin time   Result Value Ref Range    aPTT 27 22 - 38 Seconds   Basic metabolic panel   Result Value Ref Range    Sodium 140 135 - 145 mmol/L    Potassium 4.2 3.4 - 5.3 mmol/L    Chloride 112 (H) 98 - 107 mmol/L    Carbon Dioxide (CO2) 22 22 - 29 mmol/L    Anion Gap 6 (L) 7 - 15 mmol/L    Urea Nitrogen 58.0 (H) 6.0 - 20.0 mg/dL    Creatinine 1.01 0.67 - 1.17 mg/dL    GFR Estimate 88 >60 mL/min/1.73m2    Calcium 8.2 (L) 8.8 - 10.4 mg/dL    Glucose 158 (H) 70 - 99 mg/dL   Result Value Ref Range    Troponin T, High Sensitivity 11 <=22 ng/L   Result Value Ref Range    Lipase 36 13 - 60 U/L   Hepatic function panel   Result Value Ref Range    Protein Total 5.2 (L) 6.4 - 8.3 g/dL    Albumin 3.2 (L) 3.5 - 5.2 g/dL    Bilirubin Total 0.3 <=1.2 mg/dL    Alkaline Phosphatase 48 40 - 150 U/L    AST 18 0 - 45 U/L    ALT 22 0 - 70 U/L    Bilirubin Direct 0.10 0.00 - 0.30 mg/dL   D dimer quantitative   Result Value Ref Range    D-Dimer Quantitative 0.36 0.00 - 0.50 ug/mL FEU   CBC with platelets and differential   Result Value Ref Range    WBC Count 12.2 (H) 4.0 - 11.0 10e3/uL    RBC Count 3.04 (L) 4.40 - 5.90 10e6/uL    Hemoglobin 8.7 (L) 13.3 - 17.7 g/dL    Hematocrit 27.1 (L) 40.0 - 53.0 %    MCV 89 78 - 100 fL    MCH 28.6 26.5 - 33.0 pg    MCHC 32.1 31.5 - 36.5 g/dL    RDW 15.3 (H) 10.0 - 15.0 %    Platelet Count 186 150 - 450 10e3/uL    % Neutrophils 89 %    % Lymphocytes 7 %    % Monocytes 4 %    % Eosinophils 0 %    % Basophils 0 %    % Immature Granulocytes 1 %    NRBCs per 100 WBC 0 <1 /100    Absolute Neutrophils 10.8 (H) 1.6 - 8.3 10e3/uL    Absolute Lymphocytes 0.8 0.8 - 5.3 10e3/uL    Absolute Monocytes 0.5 0.0 - 1.3  10e3/uL    Absolute Eosinophils 0.0 0.0 - 0.7 10e3/uL    Absolute Basophils 0.0 0.0 - 0.2 10e3/uL    Absolute Immature Granulocytes 0.1 <=0.4 10e3/uL    Absolute NRBCs 0.0 10e3/uL   Extra Blood Culture Bottle   Result Value Ref Range    Hold Specimen JIC    Extra Red Top Tube   Result Value Ref Range    Hold Specimen JIC    Extra Green Top (Lithium Heparin) Tube   Result Value Ref Range    Hold Specimen JIC    Activated clotting time celite, POCT   Result Value Ref Range    Activated Clotting Time (Celite) POCT 239 (H) 74 - 150 seconds       RADIOLOGY:  Reviewed all pertinent imaging. Please see official radiology report.  Cardiac Catheterization   Final Result      Head CT w/o contrast   Final Result   IMPRESSION:   1.  No CT evidence for acute intracranial process.   2.  Brain atrophy and presumed chronic microvascular ischemic changes as above.         XR Chest Port 1 View   Final Result   IMPRESSION: No acute abnormality.      Echocardiogram Complete    (Results Pending)       EKG:    Performed at: 2:31 PM, 7/1/2025    Impression: Sinus rhythm. ST elevation, consider lateral injury of acute infarct. ACUTE MI/STEMI     Rate: 87 bpm  Rhythm: Sinus rhythm  Axis: 86  ME Interval: 148 ms  QRS Interval: 90 ms  QTc Interval: 438 ms  ST Changes: ST elevation, consider lateral injury or acute infarct.   Comparison: When compared with EKG of 3/14/2025, questionable change in QRS duration.     I have independently reviewed and interpreted the EKG(s) documented above.            I, Lilian Zuniga, am serving as a scribe to document services personally performed by Mitch Bronson MD based on my observation and the provider's statements to me. I, Mitch Bronson MD, attest that Lilian Zuniga is acting in a scribe capacity, has observed my performance of the services and has documented them in accordance with my direction.    Mitch Bronson MD  [facility] EMERGENCY DEPARTMENT  [address]  [place] [id number]  [phone]      Mtich Bronson MD  07/01/25 0413       Mitch Bronson MD  07/01/25 0444

## 2025-07-01 NOTE — PRE-PROCEDURE
GENERAL PRE-PROCEDURE:   Procedure:  Esophagogastroduodenoscopy  Date/Time:  7/1/2025 10:42 AM    Verbal consent obtained?: Yes    Written consent obtained?: Yes    Risks and benefits: Risks, benefits and alternatives were discussed    Consent given by:  Patient  Patient states understanding of procedure being performed: Yes    Patient's understanding of procedure matches consent: Yes    Procedure consent matches procedure scheduled: Yes    Expected level of sedation:  Moderate  Appropriately NPO:  Yes  ASA Class:  3  Mallampati  :  Grade 4- soft palate obscured by base of tongue  Lungs:  Lungs clear with good breath sounds bilaterally  Heart:  Normal heart sounds and rate  History & Physical reviewed:  History and physical reviewed and no updates needed  Statement of review:  I have reviewed the lab findings, diagnostic data, medications, and the plan for sedation

## 2025-07-01 NOTE — ED NOTES
Dr. Bronson notified regarding first dose of nitroglycerin. Pain went from 6/10 to 5/10; BP drop.     07/01/25 0318   Vital Signs   BP (!) 81/51   BP - Mean 61   Pulse 98   Oximeter Heart Rate 98 bpm   Resp 24   SpO2 97 %

## 2025-07-01 NOTE — PLAN OF CARE
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 Denies chest pain, Complaint of headache 5/10. Relieved with tylenol.TR band removed by 06:00.Reverse Barbeau normal. CMS normal.      [facility] - ICU    RN Progress Note:            Pertinent Assessments:      Please refer to flowsheet rows for full assessment      Baseline assymetrical jaw and lip related to previous surgery in [facility].           Key Events - This Shift:       G.I.  consult today.     RN Managed Protocols Ordered:  No  Protocols:  PRN'S:  Protocols Status: N/A                Barriers to Discharge / Downgrade:     New admit post angiogram.         Point of Contact Update: YES-OR-NO: Yes  If No, reason:   Name:Julianna   Phone Number:at the bedside  Summary of Conversation: Discussed Plan of care with 's help.        Problem: Acute Coronary Syndrome  Goal: Optimal Adaptation to Illness  Outcome: Progressing     Problem: Acute Coronary Syndrome  Goal: Normalized Cardiac Rhythm  Outcome: Progressing     Problem: Acute Coronary Syndrome  Goal: Effective Cardiac Pump Function  Outcome: Progressing     Problem: Gastrointestinal Bleeding  Goal: Optimal Coping with Acute Illness  Outcome: Progressing

## 2025-07-01 NOTE — H&P
[facility]    History and Physical - Hospitalist Service       Date of Admission:  7/1/2025    Assessment & Plan      Blue Davey is a 55 year old male admitted on 7/1/2025. He was brought to the ED by ambulance from home for evaluation of weakness and fall    #Abnormal ECG  #Nonobstructive coronary artery disease  - No angina symptoms  - ECG personally reviewed: Sinus rhythm at 87 bpm, nonspecific ST wave abnormalities 1, aVL, V2 and ST depressions on 3 and aVF  - Coronary angiogram showed mid LAD 40%  - Echocardiogram ordered    #Anemia  #Probable melena  - 1 episode of melena which patient attributed to eating beets  - Prerenal azotemia concerning for upper GI bleed  - Hemoglobin 8.7, down from 12.1 on 3/17/2025  - Check ferritin, iron panel, LDH, haptoglobin, reticulocyte count, direct antiglobulin test, peripheral smear  - Hemoglobin every 6 hours x 2  - Protonix 40 mg IV every 12 hours  - Bleeding scan as needed for brisk lower GI bleed, hold off on CTA for now due to IV contrast during coronary angiogram  - GI consult    #Essential hypertension  - Brief hypotension 84/53 after ED arrival  - IV hydration while nothing by mouth          Diet: NPO for Medical/Clinical Reasons Except for: Meds, Ice Chips    DVT Prophylaxis: Pneumatic Compression Devices  Hercules Catheter: Not present  Lines: None     Cardiac Monitoring: ACTIVE order. Indication: Post- PCI/Angiogram (24 hours)  Code Status: Full Code        Disposition Plan     Medically Ready for Discharge: Anticipated in 2-4 Days           Fadi Munoz MD  Hospitalist Service  [facility]  Securely message with [employer] (more info)  Text page via [employer] Paging/Directory     ______________________________________________________________________    Chief Complaint   Weakness, fall    History is obtained from the patient, electronic health record, and emergency department physician    History of Present Illness   Blue  SIMON Davey is a 55 year old male who was brought to the ED by ambulance from home for evaluation of weakness and fall.  History was obtained with a [facility]  using language line services.  Past medical history of essential hypertension, adjustment disorder, jaw surgery while in [place].  Patient reports doing well until yesterday when he felt generally weak.  He denies fevers, chills, recent traveling or sick contacts.  He reports a black stool after eating beets earlier in the day.  Otherwise he denied hematemesis, hematuria or hematochezia.  He remains generally weak without chest pain, shortness of breath, palpitations, sore throat, cough, nausea, vomiting or diaphoresis.  He fell while trying to reach a chair but denied loss of consciousness.  EMS was called and upon ED arrival ECG was concerning for acute MI.  Blood pressure was stable on initial arrival but shortly after dropped down as low as 81/51.  Patient was taken to cardiac Cath Lab where angiogram showed nonobstructive coronary artery disease.  Patient denies taking over-the-counter medications, herbal supplements, tobacco, alcohol or drugs.      Past Medical History    No past medical history on file.    Past Surgical History   Past Surgical History:   Procedure Laterality Date    CHOLANGIOGRAM N/A 3/11/2025    Procedure: with Intra-operative cholangiograms;  Surgeon: Lisette Purvis MD;  Location: [facility] OR       Prior to Admission Medications   Prior to Admission Medications   Prescriptions Last Dose Informant Patient Reported? Taking?   amLODIPine (NORVASC) 10 MG tablet   No No   Sig: Take 1 tablet (10 mg) by mouth daily.   losartan (COZAAR) 25 MG tablet   No No   Sig: Take 1 tablet (25 mg) by mouth daily.   metoprolol succinate ER (TOPROL XL) 50 MG 24 hr tablet   No No   Sig: Take 1 tablet (50 mg) by mouth daily.   omeprazole (PRILOSEC OTC) 20 MG EC tablet   No No   Sig: Take 1 tablet (20 mg) by mouth daily.   polyethylene glycol  (MIRALAX) 17 GM/Dose powder   No No   Sig: Take 17 g (1 Capful) by mouth daily as needed for constipation.   senna-docusate (SENOKOT-S/PERICOLACE) 8.6-50 MG tablet   No No   Sig: Take 1 tablet by mouth daily as needed for constipation.      Facility-Administered Medications: None           Physical Exam   Vital Signs: Temp: 98.5  F (36.9  C) Temp src: Oral BP: 123/70 Pulse: 84   Resp: 14 SpO2: 99 % O2 Device: None (Room air) Oxygen Delivery: 2 LPM  Weight: 135 lbs 4.8 oz    Constitutional: no apparent distress  Respiratory: no increased work of breathing and clear to auscultation  Cardiovascular: regular rate and rhythm  GI: normal bowel sounds, soft, and tenderness noted in the epigastric region  Skin: no bruising or bleeding  Musculoskeletal: no lower extremity pitting edema present    Medical Decision Making       65 MINUTES SPENT BY ME on the date of service doing chart review, history, exam, documentation & further activities per the note.  MANAGEMENT DISCUSSED with the following over the past 24 hours: Patient       Data     I have personally reviewed the following data over the past 24 hrs:    12.2 (H)  \   8.7 (L)   / 186     140 112 (H) 58.0 (H) /  158 (H)   4.2 22 1.01 \     ALT: 22 AST: 18 AP: 48 TBILI: 0.3   ALB: 3.2 (L) TOT PROTEIN: 5.2 (L) LIPASE: 36     Trop: 10 BNP: N/A     INR:  1.06 PTT:  27   D-dimer:  0.36 Fibrinogen:  N/A     Ferritin:  N/A % Retic:  2.3 (H) LDH:  N/A       Imaging results reviewed over the past 24 hrs:   Recent Results (from the past 24 hours)   XR Chest Port 1 View    Narrative    EXAM: XR CHEST PORT 1 VIEW  LOCATION: [facility]  DATE: 7/1/2025    INDICATION: syncope, stemi  COMPARISON: 3/13/2025.    FINDINGS: The heart is enlarged. There is no pulmonary edema. The lungs are clear. No pneumothorax.      Impression    IMPRESSION: No acute abnormality.   Head CT w/o contrast    Narrative    EXAM: CT HEAD W/O CONTRAST  LOCATION: [facility]  HOSPITAL  DATE: 7/1/2025    INDICATION: Headache.  COMPARISON: Brain MRI 12/9/2010.  TECHNIQUE: Routine CT Head without IV contrast. Multiplanar reformats. Dose reduction techniques were used.    FINDINGS:  INTRACRANIAL CONTENTS: No intracranial hemorrhage, extraaxial collection, or mass effect.  No CT evidence of acute infarct. Mild presumed chronic small vessel ischemic changes. Mild generalized volume loss. No hydrocephalus.     VISUALIZED ORBITS/SINUSES/MASTOIDS: No intraorbital abnormality. No paranasal sinus mucosal disease. No middle ear or mastoid effusion.    BONES/SOFT TISSUES: No acute abnormality.      Impression    IMPRESSION:  1.  No CT evidence for acute intracranial process.  2.  Brain atrophy and presumed chronic microvascular ischemic changes as above.     Cardiac Catheterization    Narrative    Images from the original result were not included.  Blue Davey is a 55 year old old male with HTN, h/o cholecystitis w/   complications 3/25 who is here for syncope, concern for ACS.     - non-obstructive CAD; normal L-sided filling pressures  - HgB drop noted on labs, further questioning via  line   suggests possible recent melena   - he received ASA 81mg daily indefinitely, ticagrelor 180mg once, heparin   bolus in the ED, but will hold for now while the work up is ongoing. Would   trend HgB, may need transfusion and GIB w/up  - add statin, hold anti-HTN meds for now  - continue aggressive risk factor modification          Findings:  LM:no obstruction  LAD:mid-vessel 40% bridging  Lcx:mildly irregular  RCA:dominant, no obstruction    LVEDP:10    LV-gram: grossly normal to mildly hyperdynamic LV function w/ no obvious   WMA    Access:  R Radial artery    Closure:   Vasc Band

## 2025-07-01 NOTE — ED NOTES
"Dr. Bronson at bedside to see pt.    Pt reporting \"worst headache ever\" after fall and hitting head.  "

## 2025-07-01 NOTE — LETTER
[facility]  [address] [address]  Phone: [phone]  Fax: [phone]    July 4, 2025        Blue Davey  [address] [place] [address]          To Whom It May Concern:    Blue Davey was admitted to our facility from 7/1/2025 to 7/4/2025. Please excuse him from work.    He may return to work on 7/7/2025. He should be on light duty (no heavy lifting over 10 lbs) for the next 4 weeks. If you have questions or concerns about this message please contact Mr. Davey directly for more information.    Sincerely,        Beata Hernandez MD

## 2025-07-01 NOTE — PLAN OF CARE
"Goal Outcome Evaluation:      Plan of Care Reviewed With: patient, spouse    Overall Patient Progress: improving    Outcome Evaluation: see note      [facility] - ICU    RN Progress Note:            Pertinent Assessments:      Please refer to flowsheet rows for full assessment     Pt with one maroon stool this am. Pt responded well to 1 L LR bolus for blow bp. 1 unit PRBC transfused. Recheck hgb 8 one hour tx. At 1620 assessment pt endorced feeling of \"spinning\" which is worse with activity. Second unit transfused. Recheck hgb ordered for 2000. At end of second unit, around 1830, pt denied \"spinning\" feeling despite sitting up. Advanced pt to a clear liquid diet. Plan to advance as tolerated.     A [employer]  via ipad was used for all assessments.            Key Events - This Shift:     EGD done at bedside.     Cardiac echocardiogram done at bedside.      RN Managed Protocols Ordered:  No  Protocols:  PRN'S:  Protocols Status:               Barriers to Discharge / Downgrade:     Pt downgraded to med surg tele this am.          Point of Contact Update: YES-OR-NO: Yes  If No, reason:   Name: Vikram, wife  Phone Number:  Summary of Conversation: Updated throughout day with  by Providers and RN.            "

## 2025-07-01 NOTE — CONSULTS
Pulmonary/Critical Care  7/1/2025   7:09 AM     Chart reviewed.   Admitted to ICU s/p cardiac cath.  Stable since arrival; no pressor requirements, no critical care needs.     Our service will not follow.     Jami Agustin CNP  [facility] Pulmonary/Critical Care

## 2025-07-01 NOTE — PROGRESS NOTES
"  [facility] HEART [facility]ARE   [address]  [place] [address]   [email or web address]   OFFICE: [phone]     CARDIOLOGY FOLLOW-UP NOTE      Impression and Plan     ASSESSMENT  Nonobstructive CAD  EKG changes  Initial ECG concerning for lateral ST elevations in lateral leads but nondiagnostic for STEMI, troponin 11-10  Angiogram showed nonobstructive CAD (40% mid LAD) and normal left-sided filling pressures  Echo planned for today    Other active problems:  Melena, anemia    PLAN  Further workup of blood loss anemia per GI  Echo today  Statin at discharge    Follow up: TBD    Primary Cardiologist: None    Subjective     55 year old old male with HTN, h/o cholecystitis w/ complications 3/25 who is here for syncope, concern for ACS.  ECG concerning for lateral ST elevations, CT head without acute intracranial process.  Angiogram showed nonobstructive CAD and normal left-sided filling pressures.    Patient denied chest pain or shortness of breath this morning.    Cardiac Diagnostics   Telemetry (personally reviewed): Sinus rhythm 80- 100s    ECG (personally reviewed and interpreted):   ECG, 7/1/2025: Sinus rhythm, ST elevations in 1, aVL, V2, ST depression 3 and aVF    Echocardiogram (results reviewed): In process      Cardiac Cath (results reviewed):   7/1/2025  - non-obstructive CAD; normal L-sided filling pressures  - HgB drop noted on labs, further questioning via  line suggests possible recent melena   - he received ASA 81mg daily indefinitely, ticagrelor 180mg once, heparin bolus in the ED, but will hold for now while the work up is ongoing. Would trend HgB, may need transfusion and GIB w/up  - add statin, hold anti-HTN meds for now  - continue aggressive risk factor modification    Physical Examination       BP (!) 148/84   Pulse 101   Temp 98.5  F (36.9  C) (Oral)   Resp (!) 47   Ht 1.575 m (5' 2\")   Wt 61.4 kg (135 lb 4.8 oz)   SpO2 100%   BMI 24.75 kg/m  "         Intake/Output Summary (Last 24 hours) at 7/1/2025 0812  Last data filed at 7/1/2025 0800  Gross per 24 hour   Intake 127 ml   Output 1000 ml   Net -873 ml       General: Well appearing, in no acute distress. Resting comfortably in hospital bed  Skin: No clubbing, no cyanosis.  Eyes: Extra ocular movements intact  Neck: No JVD  Lungs: Clear to auscultation bilaterally  Heart: Regular rhythm, PMI not displaced, S1, S2 normal, no S3, no S4, no heaves, no rub and no murmur.  Abdomen: Soft, nontender, bowel sounds normal.  Extremities: No peripheral edema . Grade 2/4 distal pulses bilaterally.  Neuro: Oriented to person, place and time, alert, cooperative, gait coordinated.             Imaging      CT head, 7/1/2025  IMPRESSION:  1.  No CT evidence for acute intracranial process.  2.  Brain atrophy and presumed chronic microvascular ischemic changes as above.    Lab Results   Lab Results   Component Value Date    BUN 58.0 (H) 07/01/2025     07/01/2025    CO2 22 07/01/2025       Lab Results   Component Value Date    WBC 12.2 (H) 07/01/2025    HGB 8.7 (L) 07/01/2025    HCT 27.1 (L) 07/01/2025    MCV 89 07/01/2025     07/01/2025       Lab Results   Component Value Date    INR 1.06 07/01/2025               Current Inpatient Scheduled Medications   Scheduled Meds:  Current Facility-Administered Medications   Medication Dose Route Frequency Provider Last Rate Last Admin    atorvastatin (LIPITOR) tablet 40 mg  40 mg Oral Daily Odilon Brooks MD        [Held by provider] pantoprazole (PROTONIX) EC tablet 40 mg  40 mg Oral Daily Odilon Brooks MD        pantoprazole (PROTONIX) injection 40 mg  40 mg Intravenous Q12H Fadi Munoz MD   40 mg at 07/01/25 0631     Continuous Infusions:  Current Facility-Administered Medications   Medication Dose Route Frequency Provider Last Rate Last Admin    sodium chloride 0.9 % infusion   Intravenous Continuous Fadi Munoz  mL/hr at 07/01/25 0655  New Bag at 07/01/25 0628            Medications Prior to Admission   Prior to Admission medications    Medication Sig Start Date End Date Taking? Authorizing Provider   atorvastatin (LIPITOR) 40 MG tablet Take 1 tablet (40 mg) by mouth daily. 7/1/25  Yes Odilon Brooks MD   amLODIPine (NORVASC) 10 MG tablet Take 1 tablet (10 mg) by mouth daily. 3/18/25   Yen Rivas MD   losartan (COZAAR) 25 MG tablet Take 1 tablet (25 mg) by mouth daily. 3/18/25   Yen Rivas MD   metoprolol succinate ER (TOPROL XL) 50 MG 24 hr tablet Take 1 tablet (50 mg) by mouth daily. 3/18/25   Yen Rivas MD   omeprazole (PRILOSEC OTC) 20 MG EC tablet Take 1 tablet (20 mg) by mouth daily. 3/17/25   Yen Rivas MD   polyethylene glycol (MIRALAX) 17 GM/Dose powder Take 17 g (1 Capful) by mouth daily as needed for constipation. 3/17/25   Selena Otto PA-C   senna-docusate (SENOKOT-S/PERICOLACE) 8.6-50 MG tablet Take 1 tablet by mouth daily as needed for constipation. 3/17/25   Selena Otto PA-C Daniel L. Oress, PA-C

## 2025-07-01 NOTE — ED NOTES
"Pt reports CP is down to 5/10 and \"feels a bit better\". Pt does report some dizziness after nitroglycerin.   "

## 2025-07-01 NOTE — CONSULTS
HEART CARE CONSULTATON NOTE        Assessment/Recommendations   Assessment/Plan: Blue Davey is a 55 year old old male with HTN, h/o cholecystitis w/ complications 3/25 who is here for syncope, concern for ACS.     - non-obstructive CAD; normal L-sided filling pressures  - will check a TTE to r/o structural abnormalities, telemetry for arrhythmia initial screening   - HgB drop noted on labs, further questioning via  line suggests possible recent melena   - he received ASA 81mg daily indefinitely, ticagrelor 180mg once, heparin bolus in the ED, but will hold for now while the work up is ongoing. Would trend HgB, may need transfusion and GIB w/up  - add statin, hold anti-HTN meds for now  - continue aggressive risk factor modification    Clinically Significant Risk Factors Present on Admission          # Hyperchloremia: Highest Cl = 112 mmol/L in last 2 days, will monitor as appropriate      # Hypocalcemia: Lowest Ca = 8.2 mg/dL in last 2 days, will monitor and replace as appropriate     # Hypoalbuminemia: Lowest albumin = 3.2 g/dL at 7/1/2025  2:54 AM, will monitor as appropriate       # Hypertension: Home medication list includes antihypertensive(s)          # Anemia: based on hgb <11                        History of Present Illness/Subjective    HPI: Blue Davey is a 55 year old male w/ Blue Davey is a 55 year old old male with HTN, h/o cholecystitis w/ complications 3/25 who is here for syncope, concern for ACS.     He was admitted to the ED after a syncopal event at home. On arrival had automated EKG reading concerning for lateral ST elevations, albeit non-diagnostic for STEMI on review. CT head showed no acute intracranial process, cre 1, HgB 8 form b/l 12's. He was given ASA/ticagrelor/heparin and cath lab was activated. Angiography revealed non-obstructive CAD and normal L-sided filling pressures, normal LVEF/WMA on LV-gram. +black tarry stools reported on further questioning. Patient will be  "transferred to the ICU for further care.           Physical Examination  Review of Systems   VITALS: BP (!) 81/51   Pulse 98   Temp 98.5  F (36.9  C)   Resp 24   Ht 1.575 m (5' 2\")   Wt 59 kg (130 lb)   SpO2 97%   BMI 23.78 kg/m    BMI: Body mass index is 23.78 kg/m .  Wt Readings from Last 3 Encounters:   07/01/25 59 kg (130 lb)   03/13/25 66.4 kg (146 lb 6.4 oz)   10/06/16 54.4 kg (120 lb)     No intake or output data in the 24 hours ending 07/01/25 0401  General Appearance:   no distress, normal body habitus   ENT/Mouth: membranes moist, no oral lesions or bleeding gums.      EYES:  no scleral icterus, normal conjunctivae   Neck: no carotid bruits or thyromegaly   Chest/Lungs:   lungs are clear to auscultation, no rales or wheezing, no sternal scar, equal chest wall expansion    Cardiovascular:   Regular. Normal first and second heart sounds with no murmurs, rubs, or gallops; the carotid, radial and posterior tibial pulses are intact, Jugular venous pressure 7, no edema bilaterally    Abdomen:  no organomegaly, masses, bruits, or tenderness; bowel sounds are present   Extremities: no cyanosis or clubbing   Skin: no xanthelasma, warm.    Neurologic: normal  bilateral, no tremors     Psychiatric: alert and oriented x3, calm     Review Of Systems  Skin: negative  Eyes: negative  Ears/Nose/Throat: negative  Respiratory: No shortness of breath, dyspnea on exertion, cough, or hemoptysis  Cardiovascular: negative  Gastrointestinal: negative  Genitourinary: negative  Musculoskeletal: negative  Neurologic: negative  Psychiatric: negative  Hematologic/Lymphatic/Immunologic: negative  Endocrine: negative          Lab Results    Chemistry/lipid CBC Cardiac Enzymes/BNP/TSH/INR   No results for input(s): \"CHOL\", \"HDL\", \"LDL\", \"TRIG\", \"CHOLHDLRATIO\" in the last 23999 hours.  No results for input(s): \"LDL\" in the last 85836 hours.  Recent Labs   Lab Test 07/01/25  0254      POTASSIUM 4.2   CHLORIDE 112*   CO2 22 " "  *   BUN 58.0*   CR 1.01   GFRESTIMATED 88   NIK 8.2*     Recent Labs   Lab Test 07/01/25  0254 03/17/25  1119 03/15/25  0635   CR 1.01 0.96 0.97     Recent Labs   Lab Test 03/10/25  1609   A1C 5.1          Recent Labs   Lab Test 07/01/25  0254   WBC 12.2*   HGB 8.7*   HCT 27.1*   MCV 89        Recent Labs   Lab Test 07/01/25  0254 03/17/25  1119 03/16/25  0844   HGB 8.7* 12.1* 12.5*    No results for input(s): \"TROPONINI\" in the last 04805 hours.  No results for input(s): \"BNP\", \"NTBNPI\", \"NTBNP\" in the last 77953 hours.  No results for input(s): \"TSH\" in the last 59287 hours.  Recent Labs   Lab Test 07/01/25  0254   INR 1.06        Medical History  Surgical History Family History Social History   No past medical history on file.  Past Surgical History:   Procedure Laterality Date    CHOLANGIOGRAM N/A 3/11/2025    Procedure: with Intra-operative cholangiograms;  Surgeon: Lisette Purvis MD;  [place] [facility] [place]     No family history on file.     Social History     Socioeconomic History    Marital status:      Spouse name: Not on file    Number of children: Not on file    Years of education: Not on file    Highest education level: Not on file   Occupational History    Not on file   Tobacco Use    Smoking status: Never    Smokeless tobacco: Never   Vaping Use    Vaping status: Never Used   Substance and Sexual Activity    Alcohol use: No    Drug use: Not on file    Sexual activity: Not on file   Other Topics Concern    Not on file   Social History Narrative    Not on file     Social Drivers of Health     Financial Resource Strain: Low Risk  (3/11/2025)    Financial Resource Strain     Within the past 12 months, have you or your family members you live with been unable to get utilities (heat, electricity) when it was really needed?: No   Food Insecurity: Low Risk  (3/11/2025)    Food Insecurity     Within the past 12 months, did you worry that your food would run out before you got money " to buy more?: No     Within the past 12 months, did the food you bought just not last and you didn t have money to get more?: No   Transportation Needs: Low Risk  (3/11/2025)    Transportation Needs     Within the past 12 months, has lack of transportation kept you from medical appointments, getting your medicines, non-medical meetings or appointments, work, or from getting things that you need?: No   Physical Activity: Not on file   Stress: Not on file   Social Connections: Not on file   Interpersonal Safety: Low Risk  (3/11/2025)    Interpersonal Safety     Do you feel physically and emotionally safe where you currently live?: Yes     Within the past 12 months, have you been hit, slapped, kicked or otherwise physically hurt by someone?: No     Within the past 12 months, have you been humiliated or emotionally abused in other ways by your partner or ex-partner?: No   Housing Stability: Low Risk  (3/11/2025)    Housing Stability     Do you have housing? : Yes     Are you worried about losing your housing?: No         Medications  Allergies   No current outpatient medications on file.      No Known Allergies      Odilon Brooks MD

## 2025-07-01 NOTE — PHARMACY-ADMISSION MEDICATION HISTORY
Pharmacist Admission Medication History    Admission medication history is complete. The information provided in this note is only as accurate as the sources available at the time of the update.    Information Source(s): Patient and Patient's pharmacy via in-person and phone    Pertinent Information: Called prior listed [facility] who last filled in March 2025 for amlodipine, losartan, metoprolol and omeprazole then called [facility] who last filled amlodipine, metoprolol, and omeprazole in April 2025.Unclear if losartan was stopped or pt just did not fill when got others.     Changes made to PTA medication list:  Added: None  Deleted: omeprazole, prn bowel meds  Changed: None    Allergies reviewed with patient and updates made in EHR: yes    Medication History Completed By: Natacha Dinero RPH 7/1/2025 9:18 AM    PTA Med List   Medication Sig Last Dose/Taking    amLODIPine (NORVASC) 10 MG tablet Take 1 tablet (10 mg) by mouth daily. More than a month          losartan (COZAAR) 25 MG tablet Take 1 tablet (25 mg) by mouth daily. More than a month    metoprolol succinate ER (TOPROL XL) 50 MG 24 hr tablet Take 1 tablet (50 mg) by mouth daily. More than a month

## 2025-07-01 NOTE — PROGRESS NOTES
Pt more lethargic, blood pressure to 84/61, nausea present, and pt cont to have bloody stools. MD notified. Plan to give blood when ready (currently working on antibody) and giving 1 L LR bolus now. Zofran ordered and given. Plan to monitor and EGD will be done shortly.

## 2025-07-01 NOTE — PROGRESS NOTES
[facility] - ICU Admission Note       Dual Skin Assessment:     Patient admitted from [facility] to ICU.      Comprehensive skin inspection completed by myself and Beto DAMICO.      Abnormal skin assessment findings: No      If yes above, LDA initiated for skin breakdown/non-blanchable erythema:  No     Provider notified: N/A     WOC consult order obtained: N/A

## 2025-07-01 NOTE — CONSULTS
CONSULTING PHYSICIAN   Jun Ann DO     REASON FOR CONSULTATION   Melena     CHIEF COMPLAINT   Blue Davey came to the hospital for evaluation of weakness     HISTORY OF PRESENT ILLNESS   Blue Davey is a 55 year old male with hypertension admitted with onset of weakness and inability to rise from a chair.  On admission patient was noted to be hypertensive and EKG changes showing STEMI.  Patient went urgently to the cardiac Cath Lab where he was noted to not have any obstructive disease present.  Patient also noted to have facial droop.  CT without acute CVA with chronic microvascular ischemic changes and brain atrophy noted    Patient noted to have elevated BUN and hemoglobin decreased to 7.8.  He does note some black and red stool which he attributed to beets he had previously consumed.  He denies any previous episode of GI bleeding.  He does endorse NSAID use frequently several times per week.  He denies a history of H. pylori.  He has never had endoscopy or colonoscopy       PAST HISTORY   No past medical history on file.   Past Surgical History:   Procedure Laterality Date    CHOLANGIOGRAM N/A 3/11/2025    Procedure: with Intra-operative cholangiograms;  Surgeon: Lisette Purvis MD;  Location: [facility] OR        Family History Social History   No family history on file. Social History     Tobacco Use    Smoking status: Never    Smokeless tobacco: Never   Vaping Use    Vaping status: Never Used   Substance Use Topics    Alcohol use: No          MEDICATIONS & ALLERGIES   Medications Prior to Admission   Medication Sig Dispense Refill Last Dose/Taking    amLODIPine (NORVASC) 10 MG tablet Take 1 tablet (10 mg) by mouth daily. 30 tablet 0 More than a month    losartan (COZAAR) 25 MG tablet Take 1 tablet (25 mg) by mouth daily. 30 tablet 0 More than a month    metoprolol succinate ER (TOPROL XL) 50 MG 24 hr tablet Take 1 tablet (50 mg) by mouth daily. 30 tablet 0  "More than a month        ALLERGIES   No Known Allergies      REVIEW OF SYSTEMS   A comprehensive review of systems was performed and was otherwise noncontributory.     OBJECTIVE   Vitals Blood pressure 95/63, pulse 80, temperature 98.5  F (36.9  C), temperature source Oral, resp. rate 25, height 1.575 m (5' 2\"), weight 61.4 kg (135 lb 4.8 oz), SpO2 99%.           Physical  Exam  GENERAL: alert and oriented, well nourished in no apparent distress   SKIN: warm and dry, no rashes   HEENT: atraumatic, anicteric, moist mucous membranes, neck soft/supple    PULMONARY: normal resp effort, breath sounds clear to auscultation bilateral   CARDIOVASCULAR: normal rate and rhythm, no murmurs, no edema   ABDOMEN: no tenderness, no distention, bowel sounds normal   MUSCULOSKELETAL: joints and gait normal   NEUROLOGICAL: appropriate mental status, grossly intact  DERM: no rash, no jaundice   PSYCHIATRIC: normal mood, affect and insight        LABORATORY    ELECTROLYTE PANEL   Recent Labs   Lab 07/01/25  0254      POTASSIUM 4.2   CHLORIDE 112*   CO2 22   *   CR 1.01   BUN 58.0*      HEMATOLOGY PANEL   Recent Labs   Lab 07/01/25  0254   HGB 8.7*   MCV 89   WBC 12.2*      INR 1.06      LIVER AND PANCREAS PANEL   Recent Labs   Lab 07/01/25 0254   AST 18   ALT 22   ALKPHOS 48   BILITOTAL 0.3   LIPASE 36     IMAGING STUDIES        I have reviewed the current diagnostic and laboratory tests.           IMPRESSION   Blue Davey is a 55 year old male with hypertension admitted with significant weakness concern for STEMI without obstructive cardiovascular disease and concern for GI bleeding    GI bleed-patient notes some red stool though elevated BUN would indicate upper GI bleed.  Patient does endorse NSAID use and has higher risk for H. pylori given location of birth.  Will plan EGD today.  Continue PPI IV twice daily  Keep n.p.o.  Further recs to follow EGD    If upper GI source noted on EGD then will schedule " colonoscopy for screening as an outpatient.             Vadim Nice MD  Thank you for the opportunity to participate in the care of this patient.   Please feel free to call me with any questions or concerns.  Phone number [phone].

## 2025-07-02 ENCOUNTER — VIRTUAL VISIT (OUTPATIENT)
Dept: INTERPRETER SERVICES | Facility: CLINIC | Age: 55
End: 2025-07-02
Payer: COMMERCIAL

## 2025-07-02 ENCOUNTER — DOCUMENTATION ONLY (OUTPATIENT)
Dept: OTHER | Facility: CLINIC | Age: 55
End: 2025-07-02

## 2025-07-02 DIAGNOSIS — I50.22 CHRONIC SYSTOLIC HEART FAILURE (H): Primary | ICD-10-CM

## 2025-07-02 LAB
ALBUMIN SERPL BCG-MCNC: 2.8 G/DL (ref 3.5–5.2)
ANION GAP SERPL CALCULATED.3IONS-SCNC: 6 MMOL/L (ref 7–15)
BASOPHILS # BLD AUTO: 0 10E3/UL (ref 0–0.2)
BASOPHILS NFR BLD AUTO: 1 %
BUN SERPL-MCNC: 31.9 MG/DL (ref 6–20)
CALCIUM SERPL-MCNC: 7.9 MG/DL (ref 8.8–10.4)
CHLORIDE SERPL-SCNC: 115 MMOL/L (ref 98–107)
CREAT SERPL-MCNC: 1.08 MG/DL (ref 0.67–1.17)
EGFRCR SERPLBLD CKD-EPI 2021: 81 ML/MIN/1.73M2
EOSINOPHIL # BLD AUTO: 0.1 10E3/UL (ref 0–0.7)
EOSINOPHIL NFR BLD AUTO: 2 %
ERYTHROCYTE [DISTWIDTH] IN BLOOD BY AUTOMATED COUNT: 14.2 % (ref 10–15)
GLUCOSE SERPL-MCNC: 105 MG/DL (ref 70–99)
HCO3 SERPL-SCNC: 22 MMOL/L (ref 22–29)
HCT VFR BLD AUTO: 27 % (ref 40–53)
HGB BLD-MCNC: 9.2 G/DL (ref 13.3–17.7)
IMM GRANULOCYTES # BLD: 0 10E3/UL
IMM GRANULOCYTES NFR BLD: 0 %
LYMPHOCYTES # BLD AUTO: 1.3 10E3/UL (ref 0.8–5.3)
LYMPHOCYTES NFR BLD AUTO: 23 %
MCH RBC QN AUTO: 30.1 PG (ref 26.5–33)
MCHC RBC AUTO-ENTMCNC: 34.1 G/DL (ref 31.5–36.5)
MCV RBC AUTO: 88 FL (ref 78–100)
MONOCYTES # BLD AUTO: 0.4 10E3/UL (ref 0–1.3)
MONOCYTES NFR BLD AUTO: 7 %
NEUTROPHILS # BLD AUTO: 3.9 10E3/UL (ref 1.6–8.3)
NEUTROPHILS NFR BLD AUTO: 67 %
NRBC # BLD AUTO: 0 10E3/UL
NRBC BLD AUTO-RTO: 0 /100
PATH REPORT.COMMENTS IMP SPEC: NORMAL
PATH REPORT.FINAL DX SPEC: NORMAL
PATH REPORT.MICROSCOPIC SPEC OTHER STN: NORMAL
PHOSPHATE SERPL-MCNC: 2.5 MG/DL (ref 2.5–4.5)
PLATELET # BLD AUTO: 128 10E3/UL (ref 150–450)
POTASSIUM SERPL-SCNC: 4.1 MMOL/L (ref 3.4–5.3)
RBC # BLD AUTO: 3.06 10E6/UL (ref 4.4–5.9)
SODIUM SERPL-SCNC: 143 MMOL/L (ref 135–145)
WBC # BLD AUTO: 5.9 10E3/UL (ref 4–11)

## 2025-07-02 PROCEDURE — T1013 SIGN LANG/ORAL INTERPRETER: HCPCS | Mod: U4,TEL,95 | Performed by: INTERPRETER

## 2025-07-02 PROCEDURE — 120N000004 HC R&B MS OVERFLOW

## 2025-07-02 PROCEDURE — 82310 ASSAY OF CALCIUM: CPT | Performed by: INTERNAL MEDICINE

## 2025-07-02 PROCEDURE — 85025 COMPLETE CBC W/AUTO DIFF WBC: CPT | Performed by: INTERNAL MEDICINE

## 2025-07-02 PROCEDURE — 36415 COLL VENOUS BLD VENIPUNCTURE: CPT | Performed by: INTERNAL MEDICINE

## 2025-07-02 PROCEDURE — 99233 SBSQ HOSP IP/OBS HIGH 50: CPT | Performed by: HOSPITALIST

## 2025-07-02 PROCEDURE — 250N000011 HC RX IP 250 OP 636: Performed by: HOSPITALIST

## 2025-07-02 PROCEDURE — 250N000013 HC RX MED GY IP 250 OP 250 PS 637: Performed by: INTERNAL MEDICINE

## 2025-07-02 RX ORDER — IODIXANOL 320 MG/ML
INJECTION, SOLUTION INTRAVASCULAR
OUTPATIENT
Start: 2025-07-01

## 2025-07-02 RX ORDER — METOPROLOL SUCCINATE 25 MG/1
25 TABLET, EXTENDED RELEASE ORAL DAILY
Status: DISPENSED | OUTPATIENT
Start: 2025-07-03

## 2025-07-02 RX ADMIN — PANTOPRAZOLE SODIUM 40 MG: 40 INJECTION, POWDER, FOR SOLUTION INTRAVENOUS at 20:19

## 2025-07-02 RX ADMIN — PANTOPRAZOLE SODIUM 40 MG: 40 INJECTION, POWDER, FOR SOLUTION INTRAVENOUS at 08:25

## 2025-07-02 RX ADMIN — ATORVASTATIN CALCIUM 40 MG: 40 TABLET, FILM COATED ORAL at 08:25

## 2025-07-02 ASSESSMENT — ACTIVITIES OF DAILY LIVING (ADL)
ADLS_ACUITY_SCORE: 38
ADLS_ACUITY_SCORE: 35
ADLS_ACUITY_SCORE: 43
ADLS_ACUITY_SCORE: 35
ADLS_ACUITY_SCORE: 38
ADLS_ACUITY_SCORE: 38
ADLS_ACUITY_SCORE: 35
ADLS_ACUITY_SCORE: 38
ADLS_ACUITY_SCORE: 35
ADLS_ACUITY_SCORE: 38
ADLS_ACUITY_SCORE: 35
ADLS_ACUITY_SCORE: 38
ADLS_ACUITY_SCORE: 35
ADLS_ACUITY_SCORE: 38

## 2025-07-02 NOTE — PROGRESS NOTES
"[facility] Progress Note       SUBJECTIVE:  Patient reports a black stool earlier today. No abdominal pain, nausea or vomiting, tolerating regular diet.     Patient seen with [employer]  via [employer].        OBJECTIVE:  BP (!) 159/88 (BP Location: Left arm)   Pulse 73   Temp 98.6  F (37  C) (Oral)   Resp 15   Ht 1.575 m (5' 2\")   Wt 62.6 kg (137 lb 14.4 oz)   SpO2 98%   BMI 25.22 kg/m    Temp (24hrs), Av.5  F (36.9  C), Min:97.7  F (36.5  C), Max:98.9  F (37.2  C)    Patient Vitals for the past 72 hrs:   Weight   25 62.6 kg (137 lb 14.4 oz)   25 61.4 kg (135 lb 4.8 oz)   25 59 kg (130 lb)       Intake/Output Summary (Last 24 hours) at 2025 1212  Last data filed at 2025  Gross per 24 hour   Intake 721 ml   Output 825 ml   Net -104 ml        PHYSICAL EXAM  GEN: NAD, male appears stated age sitting up in chair  HRT: no LE edema  RESP: unlabored  ABD: nondistended, soft, nontender  SKIN: No rash or jaundice      Additional Data:  I have reviewed the patient's new clinical lab results:     Recent Labs   Lab Test 25  1438 25  1119   WBC 5.9  --   --  12.2* 13.1*   HGB 9.2* 9.7* 8.0* 8.7* 12.1*   MCV 88 89 90 89 87   *  --   --  186 350   INR  --   --   --  1.06  --      Recent Labs   Lab Test 25  1119   POTASSIUM 4.1 4.2 4.0   CHLORIDE 115* 112* 105   CO2 22 22 24   BUN 31.9* 58.0* 10.3   ANIONGAP 6* 6* 7     Recent Labs   Lab Test 25/15/25  0635 25  0259 25  1606 25  0658   ALBUMIN 2.8*  --  3.2* 3.2* 2.8*  --  3.0*   BILITOTAL  --   --  0.3 0.9 1.3*  --  2.2*   ALT  --   --  22 75* 96*  --  124*   AST  --   --  18 24 26  --  42   PROTEIN  --  Negative  --   --   --  70*  --    LIPASE  --   --  36  --  56  --  145*         Imaging results:  CT head 25:  IMPRESSION:  1.  No CT " evidence for acute intracranial process.  2.  Brain atrophy and presumed chronic microvascular ischemic changes as above.    CXR 7/1/25:  IMPRESSION: No acute abnormality.     Procedure results:  EGD 7/1/25 ([facility]):  Findings:       The examined esophagus was normal.        One non-bleeding cratered gastric ulcer with a visible vessel was found        in the gastric antrum. The lesion was 10 mm in largest dimension. Area        was successfully injected with 1.5 mL of a 0.1 mg/mL solution of        epinephrine for hemostasis. Coagulation for hemostasis using bipolar        probe was successful. Biopsies were taken with a cold forceps for        Helicobacter pylori testing.        Few other scattered superficial ulcers present        The examined duodenum was normal.                                                                                    Impression:            - Normal esophagus.                          - Non-bleeding gastric ulcer with a visible vessel.                          Injected. Treated with bipolar cautery. Biopsied.                          - Normal examined duodenum.   Recommendation:        - Await pathology results. (pending)                         - Use Protonix (pantoprazole) 40 mg IV BID for 3 days,                          then daily for 2 months                          - repeat EGD in 2 months.      IMPRESSION:  PUD with GI bleed  This is a 54 y/o male with PMH HTN, adjustment disorder, jaw surgery admitted 7/1 with significant weakness, concern for STEMI with so went emergently to cath lab where no significant CAD was noted, also noted to have facial droop without CVA on CT, noted to have elevated BUN and low hgb of 7.8 with black/red stool. EGD 7/1 with non-bleeding gastric ulcer with visible vessel, injected and cauterized, path pending. He has been taking NSAIDs as his biggest risk factor, presuming biopsies are negative for H. Pylori, and avoidance was discussed today. His stools  remain black but hgb fairly stable, 9.2 this AM. He is on IV PPI and would continue this through tomorrow.     PLAN:  - Regular diet as tolerated  - Await EGD path  - Continue IV PPI  - Hemoglobin monitoring per medicine   - Repeat EGD in 2 months ([facility] will call pt to arrange)      (Dr. Nice)  Mishel Paulino PA-C  [facility]  7/2/2025 12:12 PM  [phone] (office)    40 minutes of total time was spent providing patient care, including patient evaluation, reviewing documentation/test results, , and documentation.  ________________________________________________________________________

## 2025-07-02 NOTE — PLAN OF CARE
Chart review note. Nursing notes reviewed. Labs reviewed. VSS reviewed. Pt appears to be stable.  Borderline LVEF on echo possibly stress mediated and will get repeat echo as an outpatient in a few weeks at time of follow-up.  Plan for resuming Toprol and losartan when able.  Follow up with cardiology to be arranged by myself.    No further general cardiology recommendations.     Cardiology will sign off, please call with any further questions. Thank you for allowing us to partake in the care of this patient.       Ernesto Haynes PA-C

## 2025-07-02 NOTE — PROGRESS NOTES
[facility]    Medicine Progress Note - Hospitalist Service    Date of Admission:  7/1/2025    Assessment & Plan                Blue Davey is a 55 year old male with history of hypertension, mood disorder, right lower jaw surgery in 1985 resulting in chronic facial droop here for weakness and a fall found to have bleeding peptic ulcer. Hospital Day: 2       # Weakness  # Fall  -Likely orthostasis or vasovagal related to acute blood loss anemia  -There was concern for possible anginal equivalent, patient had urgent coronary angiogram did not show any severe stenosis  -Now walking without symptoms    # Acute blood loss anemia  #melena  # Peptic ulcer  - Noted 1 episode of melena prior to arrival, ongoing melena here  - Hemoglobin 8.0, down from 12.1 back in March  -Has received 2 units PRBC during hospital stay.  Today hemoglobin 9.2  -GI took him for EGD found cratered peptic ulcer with exposed vessel, no longer actively bleeding.  Lesion was injected and cauterized.  Biopsies were taken.  Multiple other superficial ulcers were also noted.  - GI recommending 72 hours of Protonix 40 mg IV every 12 hours, followed by p.o. daily for 2 months  -He will need a follow-up EGD in 2 months  - Recheck hemoglobin in the morning  -Suspect the ongoing melena is due to old blood passing, but continue to monitor closely     #Nonobstructive coronary artery disease  - No angina symptoms  -Cardiology took him for urgent coronary angiogram showed mild nonobstructive disease  - Cardiology started him on atorvastatin    # Nonischemic cardiomyopathy  - Echocardiogram showed mildly reduced LVEF 50 to 55% with abnormal strain  -Cardiology favor stress-induced  -Recommend resuming his home metoprolol and ARB when able- will resume home metoprolol at lower dose tomorrow morning  -Needs a follow-up echo in 4 to 6 weeks     #Essential hypertension  - Patient has not been taking his home BP meds in over a month  -  Cautiously resume home metoprolol as above    # Thrombocytopenia  - Mild.  Possibly consumptive as was normal on admission.  Recheck in the morning    # Left facial droop  - Due to previous right sided jaw surgery in 1985.  Unchanged from baseline per patient          Diet: Advance Diet as Tolerated: Regular Diet Adult    DVT Prophylaxis: Moderate risk. SCDs   Hercules Catheter: Not present  Lines: None     Cardiac Monitoring: None  Code Status: Full Code      Clinically Significant Risk Factors          # Hyperchloremia: Highest Cl = 115 mmol/L in last 2 days, will monitor as appropriate          # Hypoalbuminemia: Lowest albumin = 2.8 g/dL at 7/2/2025  4:10 AM, will monitor as appropriate   # Thrombocytopenia: Lowest platelets = 128 in last 2 days, will monitor for bleeding   # Hypertension: Noted on problem list          , PRESENT ON ADMISSION            Disposition Plan     Medically Ready for Discharge: Anticipated in 2-4 Days         Discharge barrier(s): IV PPI, monitoring  Care discussed with: patient      Beata Hernandez MD  Hospitalist Service  [facility]  Securely message with [employer] (more info)  Text page via [email or web address] Paging/Directory   ______________________________________________________________________      Physical Exam   Vital Signs: Temp: 98.6  F (37  C) Temp src: Oral BP: (!) 157/79 Pulse: 61   Resp: 18 SpO2: 98 % O2 Device: None (Room air)    Weight: 137 lbs 14.4 oz    General: in no apparent distress, non-toxic, and alert male sitting in bedside chair oriented x3  HEENT: Head normocephalic atraumatic, oral mucosa moist. Sclerae anicteric. He is missing teeth on right lower jaw.  CV: Regular rhythm, normal rate, no murmurs  Resp: No wheezes, no rales or rhonchi, no focal consolidations  GI: Belly soft, nondistended, nontender, bowel sounds present  Skin: No rashes or lesions  Extremities: No peripheral edema  Psych: Normal affect, mood euthymic  Neuro: talks out of the left side  of his mouth      Medical Decision Making             Data   Recent Results (from the past 16 hours)   Renal panel    Collection Time: 07/02/25  4:10 AM   Result Value Ref Range    Sodium 143 135 - 145 mmol/L    Potassium 4.1 3.4 - 5.3 mmol/L    Chloride 115 (H) 98 - 107 mmol/L    Carbon Dioxide (CO2) 22 22 - 29 mmol/L    Anion Gap 6 (L) 7 - 15 mmol/L    Glucose 105 (H) 70 - 99 mg/dL    Urea Nitrogen 31.9 (H) 6.0 - 20.0 mg/dL    Creatinine 1.08 0.67 - 1.17 mg/dL    GFR Estimate 81 >60 mL/min/1.73m2    Calcium 7.9 (L) 8.8 - 10.4 mg/dL    Albumin 2.8 (L) 3.5 - 5.2 g/dL    Phosphorus 2.5 2.5 - 4.5 mg/dL   CBC with platelets and differential    Collection Time: 07/02/25  4:10 AM   Result Value Ref Range    WBC Count 5.9 4.0 - 11.0 10e3/uL    RBC Count 3.06 (L) 4.40 - 5.90 10e6/uL    Hemoglobin 9.2 (L) 13.3 - 17.7 g/dL    Hematocrit 27.0 (L) 40.0 - 53.0 %    MCV 88 78 - 100 fL    MCH 30.1 26.5 - 33.0 pg    MCHC 34.1 31.5 - 36.5 g/dL    RDW 14.2 10.0 - 15.0 %    Platelet Count 128 (L) 150 - 450 10e3/uL    % Neutrophils 67 %    % Lymphocytes 23 %    % Monocytes 7 %    % Eosinophils 2 %    % Basophils 1 %    % Immature Granulocytes 0 %    NRBCs per 100 WBC 0 <1 /100    Absolute Neutrophils 3.9 1.6 - 8.3 10e3/uL    Absolute Lymphocytes 1.3 0.8 - 5.3 10e3/uL    Absolute Monocytes 0.4 0.0 - 1.3 10e3/uL    Absolute Eosinophils 0.1 0.0 - 0.7 10e3/uL    Absolute Basophils 0.0 0.0 - 0.2 10e3/uL    Absolute Immature Granulocytes 0.0 <=0.4 10e3/uL    Absolute NRBCs 0.0 10e3/uL       Interval History     Patient states doing fine today. He is concerned about ongoing melenic stool, reassurance provided. Patient is very worried about his prognosis and very appreciative of care received. Discussed continuing IV PPI for three days as recommended by SHALOM. Noted patient with facial droop, he states this is due to old dental surgery in 1985. Not ready for discharge likely two more days.

## 2025-07-02 NOTE — PLAN OF CARE
[facility] - ICU    RN Progress Note:            Pertinent Assessments:      Please refer to flowsheet rows for full assessment     Alert and oriented, denies pain and discomfort. Had one black stool beginning of shift. Hemoglobin stable, no sign of active bleeding noted. Vitals stable, no new concern so far.           Key Events - This Shift:       See above     RN Managed Protocols Ordered:  No  Protocols:  PRN'S:  Protocols Status: N/A                Barriers to Discharge / Downgrade:     Patient on med-tele status, monitoring hemoglobin and bleeding sign             Goal Outcome Evaluation:      Plan of Care Reviewed With: patient    Overall Patient Progress: improvingOverall Patient Progress: improving    Outcome Evaluation: hemoglobin stable, vitals stable

## 2025-07-02 NOTE — PLAN OF CARE
[facility] - ICU    RN Progress Note:            Pertinent Assessments:      Please refer to flowsheet rows for full assessment     Alertx4, lung sounds clear, on room air, SpO2 98%+. Up to chair for meals, stand by assist. At about 1000, had 1 soft melena stool, then at about 1835 had 1 small melena stool.       Telemetry reads Normal Sinus Rhythm with ST elevation (V2).             Key Events - This Shift:       Had 1 melena stools. Hgb > 9.     RN Managed Protocols Ordered:  No  Protocols:  PRN'S:  Protocols Status: N/A                Barriers to Discharge / Downgrade:     Melena stools, stabilized hgb.         Point of Contact Update: YES-OR-NO: Yes  If No, reason:   Name:  Phone Number:  Summary of Conversation:           Problem: Acute Coronary Syndrome  Goal: Normalized Cardiac Rhythm  Outcome: Progressing  Goal: Effective Cardiac Pump Function  Outcome: Progressing     Problem: Gastrointestinal Bleeding  Goal: Optimal Coping with Acute Illness  Outcome: Progressing  Goal: Hemostasis  Outcome: Progressing     Problem: Adult Inpatient Plan of Care  Goal: Absence of Hospital-Acquired Illness or Injury  Intervention: Identify and Manage Fall Risk  Recent Flowsheet Documentation  Taken 7/2/2025 1600 by Yuliana Garibay RN  Safety Promotion/Fall Prevention: nonskid shoes/slippers when out of bed  Taken 7/2/2025 1215 by Yuliana Garibay RN  Safety Promotion/Fall Prevention: nonskid shoes/slippers when out of bed  Taken 7/2/2025 0815 by Yuliana Garibay RN  Safety Promotion/Fall Prevention: nonskid shoes/slippers when out of bed  Intervention: Prevent Skin Injury  Recent Flowsheet Documentation  Taken 7/2/2025 1800 by Yuliana Garibay RN  Body Position: position changed independently  Taken 7/2/2025 1600 by Yuliana Garibay RN  Body Position: position changed independently  Taken 7/2/2025 1400 by Yuliana Garibay RN  Body Position:   position changed independently   weight  shifting  Taken 7/2/2025 1000 by Yuliana Garibay RN  Body Position:   position changed independently   weight shifting  Taken 7/2/2025 0815 by Yuliana Garibay RN  Body Position:   position changed independently   weight shifting  Intervention: Prevent and Manage VTE (Venous Thromboembolism) Risk  Recent Flowsheet Documentation  Taken 7/2/2025 1600 by Yuliana Garibay RN  VTE Prevention/Management: SCDs off (sequential compression devices)  Taken 7/2/2025 1215 by Yuliana Garibay RN  VTE Prevention/Management: SCDs off (sequential compression devices)  Taken 7/2/2025 0815 by Yuliana Garibay RN  VTE Prevention/Management: SCDs off (sequential compression devices)  Goal: Optimal Comfort and Wellbeing  Intervention: Provide Person-Centered Care  Recent Flowsheet Documentation  Taken 7/2/2025 1600 by Yuliana Garibay RN  Trust Relationship/Rapport:   care explained   choices provided   questions encouraged   questions answered   thoughts/feelings acknowledged   reassurance provided  Taken 7/2/2025 1215 by Yuliana Garibay RN  Trust Relationship/Rapport:   care explained   choices provided   questions encouraged   questions answered   thoughts/feelings acknowledged   reassurance provided  Taken 7/2/2025 0815 by Yuliana Garibay RN  Trust Relationship/Rapport:   care explained   choices provided   questions encouraged   questions answered   thoughts/feelings acknowledged   reassurance provided     Problem: Risk for Delirium  Goal: Improved Behavioral Control  Intervention: Minimize Safety Risk  Recent Flowsheet Documentation  Taken 7/2/2025 1600 by Yuliana Garibay RN  Enhanced Safety Measures: pain management  Trust Relationship/Rapport:   care explained   choices provided   questions encouraged   questions answered   thoughts/feelings acknowledged   reassurance provided  Taken 7/2/2025 1215 by Yuliana Garibay RN  Enhanced Safety Measures: pain management  Trust Relationship/Rapport:   care explained   choices  provided   questions encouraged   questions answered   thoughts/feelings acknowledged   reassurance provided  Taken 7/2/2025 0815 by Yuliana Garibay, RN  Enhanced Safety Measures: pain management  Trust Relationship/Rapport:   care explained   choices provided   questions encouraged   questions answered   thoughts/feelings acknowledged   reassurance provided

## 2025-07-03 VITALS
RESPIRATION RATE: 16 BRPM | TEMPERATURE: 98 F | SYSTOLIC BLOOD PRESSURE: 172 MMHG | BODY MASS INDEX: 24.51 KG/M2 | HEART RATE: 57 BPM | DIASTOLIC BLOOD PRESSURE: 82 MMHG | WEIGHT: 133.2 LBS | HEIGHT: 62 IN | OXYGEN SATURATION: 96 %

## 2025-07-03 PROBLEM — K25.4 GASTROINTESTINAL HEMORRHAGE ASSOCIATED WITH GASTRIC ULCER: Status: ACTIVE | Noted: 2025-07-03

## 2025-07-03 PROBLEM — K92.2 GIB (GASTROINTESTINAL BLEEDING): Status: ACTIVE | Noted: 2025-07-03

## 2025-07-03 LAB
ANION GAP SERPL CALCULATED.3IONS-SCNC: 5 MMOL/L (ref 7–15)
BUN SERPL-MCNC: 20.8 MG/DL (ref 6–20)
CALCIUM SERPL-MCNC: 8.1 MG/DL (ref 8.8–10.4)
CHLORIDE SERPL-SCNC: 113 MMOL/L (ref 98–107)
CREAT SERPL-MCNC: 0.99 MG/DL (ref 0.67–1.17)
EGFRCR SERPLBLD CKD-EPI 2021: 90 ML/MIN/1.73M2
ERYTHROCYTE [DISTWIDTH] IN BLOOD BY AUTOMATED COUNT: 13.8 % (ref 10–15)
GLUCOSE SERPL-MCNC: 112 MG/DL (ref 70–99)
HCO3 SERPL-SCNC: 25 MMOL/L (ref 22–29)
HCT VFR BLD AUTO: 26.5 % (ref 40–53)
HGB BLD-MCNC: 9.2 G/DL (ref 13.3–17.7)
MCH RBC QN AUTO: 30.3 PG (ref 26.5–33)
MCHC RBC AUTO-ENTMCNC: 34.7 G/DL (ref 31.5–36.5)
MCV RBC AUTO: 87 FL (ref 78–100)
PATH REPORT.COMMENTS IMP SPEC: NORMAL
PATH REPORT.FINAL DX SPEC: NORMAL
PATH REPORT.GROSS SPEC: NORMAL
PATH REPORT.MICROSCOPIC SPEC OTHER STN: NORMAL
PATH REPORT.RELEVANT HX SPEC: NORMAL
PHOTO IMAGE: NORMAL
PLATELET # BLD AUTO: 142 10E3/UL (ref 150–450)
POTASSIUM SERPL-SCNC: 4 MMOL/L (ref 3.4–5.3)
RBC # BLD AUTO: 3.04 10E6/UL (ref 4.4–5.9)
SODIUM SERPL-SCNC: 143 MMOL/L (ref 135–145)
WBC # BLD AUTO: 4.2 10E3/UL (ref 4–11)

## 2025-07-03 PROCEDURE — 250N000011 HC RX IP 250 OP 636: Performed by: HOSPITALIST

## 2025-07-03 PROCEDURE — 80048 BASIC METABOLIC PNL TOTAL CA: CPT | Performed by: HOSPITALIST

## 2025-07-03 PROCEDURE — 99232 SBSQ HOSP IP/OBS MODERATE 35: CPT | Performed by: HOSPITALIST

## 2025-07-03 PROCEDURE — 85018 HEMOGLOBIN: CPT | Performed by: HOSPITALIST

## 2025-07-03 PROCEDURE — 120N000001 HC R&B MED SURG/OB

## 2025-07-03 PROCEDURE — 250N000013 HC RX MED GY IP 250 OP 250 PS 637: Performed by: INTERNAL MEDICINE

## 2025-07-03 PROCEDURE — 36415 COLL VENOUS BLD VENIPUNCTURE: CPT | Performed by: HOSPITALIST

## 2025-07-03 PROCEDURE — 250N000013 HC RX MED GY IP 250 OP 250 PS 637: Performed by: HOSPITALIST

## 2025-07-03 RX ORDER — LOSARTAN POTASSIUM 25 MG/1
25 TABLET ORAL DAILY
Status: DISPENSED | OUTPATIENT
Start: 2025-07-04

## 2025-07-03 RX ADMIN — METOPROLOL SUCCINATE 25 MG: 25 TABLET, EXTENDED RELEASE ORAL at 08:27

## 2025-07-03 RX ADMIN — PANTOPRAZOLE SODIUM 40 MG: 40 INJECTION, POWDER, FOR SOLUTION INTRAVENOUS at 20:52

## 2025-07-03 RX ADMIN — PANTOPRAZOLE SODIUM 40 MG: 40 INJECTION, POWDER, FOR SOLUTION INTRAVENOUS at 08:27

## 2025-07-03 RX ADMIN — ATORVASTATIN CALCIUM 40 MG: 40 TABLET, FILM COATED ORAL at 08:28

## 2025-07-03 ASSESSMENT — ACTIVITIES OF DAILY LIVING (ADL)
ADLS_ACUITY_SCORE: 34
ADLS_ACUITY_SCORE: 35
ADLS_ACUITY_SCORE: 34
ADLS_ACUITY_SCORE: 35
ADLS_ACUITY_SCORE: 34
DEPENDENT_IADLS:: INDEPENDENT
ADLS_ACUITY_SCORE: 34
ADLS_ACUITY_SCORE: 34
ADLS_ACUITY_SCORE: 35
ADLS_ACUITY_SCORE: 34
ADLS_ACUITY_SCORE: 35
ADLS_ACUITY_SCORE: 34
ADLS_ACUITY_SCORE: 35
ADLS_ACUITY_SCORE: 34

## 2025-07-03 NOTE — PLAN OF CARE
- denied any abdominal pain or discomfort. No melena noted  - very independent with cares

## 2025-07-03 NOTE — PLAN OF CARE
Patient independent in room after setup.  No stools tonight.  Patient denies pain.  ST elevation in lead V2.  Patient denies any concerns at this time.    Saman Castro RN

## 2025-07-03 NOTE — PROGRESS NOTES
[facility]    Medicine Progress Note - Hospitalist Service    Date of Admission:  7/1/2025    Assessment & Plan                Blue Davey is a 55 year old male with history of hypertension, mood disorder, right lower jaw surgery in 1985 resulting in chronic facial droop here for weakness and a fall found to have bleeding peptic ulcer. Hospital Day: 3       # Weakness  # Fall  -Likely orthostasis or vasovagal related to acute blood loss anemia  -There was concern for possible anginal equivalent, patient had urgent coronary angiogram did not show any severe stenosis  -Now walking without symptoms    # Acute blood loss anemia  #melena  # Peptic ulcer  - Noted 1 episode of melena prior to arrival, had ongoing melena here, now resolved  - Hemoglobin gurinder 8.0, down from 12.1 back in March  -Has received 2 units PRBC during hospital stay.  Today hemoglobin 9.2  -GI took him for EGD found cratered peptic ulcer with exposed vessel, no longer actively bleeding.  Lesion was injected and cauterized.  Biopsies were taken.  Multiple other superficial ulcers were also noted.  - GI recommending 72 hours of Protonix 40 mg IV every 12 hours, followed by p.o. daily for 2 months  -He will need a follow-up EGD in 2 months  - Recheck hemoglobin in the morning  -Suspect the ongoing melena is due to old blood passing, but continue to monitor closely     #Nonobstructive coronary artery disease  - No angina symptoms  -Cardiology took him for urgent coronary angiogram showed mild nonobstructive disease  - Cardiology started him on atorvastatin    # Nonischemic cardiomyopathy  - Echocardiogram showed mildly reduced LVEF 50 to 55% with abnormal strain  -Cardiology favor stress-induced  -Recommend resuming his home metoprolol and ARB when able- will resume home metoprolol at lower dose tomorrow morning  -Needs a follow-up echo in 4 to 6 weeks     #Essential hypertension  - Patient has not been taking his home BP meds  in over a month  - Cautiously resumed home metoprolol as above, HR in the 50s, continue this lower dose  -resume home losartan at previous dose 25mg    # Thrombocytopenia  - Mild.  Possibly consumptive as was normal on admission.  improving    # Left facial droop  - Due to previous right sided jaw surgery in 1985.  Unchanged from baseline per patient          Diet: Advance Diet as Tolerated: Regular Diet Adult    DVT Prophylaxis: Moderate risk. SCDs   Hercules Catheter: Not present  Lines: None     Cardiac Monitoring: None  Code Status: Full Code      Clinically Significant Risk Factors          # Hyperchloremia: Highest Cl = 115 mmol/L in last 2 days, will monitor as appropriate          # Hypoalbuminemia: Lowest albumin = 2.8 g/dL at 7/2/2025  4:10 AM, will monitor as appropriate   # Thrombocytopenia: Lowest platelets = 128 in last 2 days, will monitor for bleeding   # Hypertension: Noted on problem list                       Disposition Plan     Medically Ready for Discharge: Anticipated Tomorrow         Discharge barrier(s): IV PPI, monitoring  Care discussed with: patient      Beata Hernandez MD  Hospitalist Service  [facility]  Securely message with [employer] [place] (more info)  Text page via [employer] Paging/Directory   ______________________________________________________________________      Physical Exam   Vital Signs: Temp: 98  F (36.7  C) Temp src: Oral BP: (!) 164/77 Pulse: 54   Resp: (!) 49 SpO2: 97 % O2 Device: None (Room air)    Weight: 133 lbs 3.2 oz    General: in no apparent distress, non-toxic, and alert male sitting in bedside chair oriented x3  HEENT: Head normocephalic atraumatic, oral mucosa moist. Sclerae anicteric. He is missing teeth on right lower jaw.  Skin: No rashes or lesions  Extremities: No peripheral edema  Psych: Normal affect, mood euthymic  Neuro: talks out of the left side of his mouth      Medical Decision Making             Data   Recent Results (from the past 16  hours)   CBC with platelets    Collection Time: 07/03/25  4:32 AM   Result Value Ref Range    WBC Count 4.2 4.0 - 11.0 10e3/uL    RBC Count 3.04 (L) 4.40 - 5.90 10e6/uL    Hemoglobin 9.2 (L) 13.3 - 17.7 g/dL    Hematocrit 26.5 (L) 40.0 - 53.0 %    MCV 87 78 - 100 fL    MCH 30.3 26.5 - 33.0 pg    MCHC 34.7 31.5 - 36.5 g/dL    RDW 13.8 10.0 - 15.0 %    Platelet Count 142 (L) 150 - 450 10e3/uL   Basic metabolic panel    Collection Time: 07/03/25  4:32 AM   Result Value Ref Range    Sodium 143 135 - 145 mmol/L    Potassium 4.0 3.4 - 5.3 mmol/L    Chloride 113 (H) 98 - 107 mmol/L    Carbon Dioxide (CO2) 25 22 - 29 mmol/L    Anion Gap 5 (L) 7 - 15 mmol/L    Urea Nitrogen 20.8 (H) 6.0 - 20.0 mg/dL    Creatinine 0.99 0.67 - 1.17 mg/dL    GFR Estimate 90 >60 mL/min/1.73m2    Calcium 8.1 (L) 8.8 - 10.4 mg/dL    Glucose 112 (H) 70 - 99 mg/dL       Interval History     Patient reports feeling better today. epigastric pain adequately managed with medications. He had questions about recommended diet with his Peptic ulcer- recommended avoid crunchy, sour, spicy food for the time being but otherwise regular diet as tolerated. Patient requested a work note which I will complete tomorrow at the time of discharge.

## 2025-07-03 NOTE — PROGRESS NOTES
"[facility] Progress Note       SUBJECTIVE:  Patient reports his stool is less black but was more blue this morning (charted as brown). He denies abdominal pain, nausea or vomiting, tolerating regular diet.     Patient seen with [facility]  via [employer].        OBJECTIVE:  BP (!) 167/99 (BP Location: Left arm)   Pulse 74   Temp 98.6  F (37  C) (Oral)   Resp 14   Ht 1.575 m (5' 2\")   Wt 60.4 kg (133 lb 3.2 oz)   SpO2 98%   BMI 24.36 kg/m    Temp (24hrs), Av.5  F (36.9  C), Min:97.7  F (36.5  C), Max:98.9  F (37.2  C)    Patient Vitals for the past 72 hrs:   Weight   25 0600 60.4 kg (133 lb 3.2 oz)   25 62.6 kg (137 lb 14.4 oz)   25 61.4 kg (135 lb 4.8 oz)   25 025 59 kg (130 lb)       Intake/Output Summary (Last 24 hours) at 2025 1212  Last data filed at 2025  Gross per 24 hour   Intake 721 ml   Output 825 ml   Net -104 ml        PHYSICAL EXAM  GEN: NAD, male appears stated age sitting up in chair  HRT: no LE edema  RESP: unlabored  ABD: nondistended, soft, nontender  SKIN: No rash or jaundice      Additional Data:  I have reviewed the patient's new clinical lab results:     Recent Labs   Lab Test 25  1438 25  0254   WBC 4.2 5.9  --   --  12.2*   HGB 9.2* 9.2* 9.7*   < > 8.7*   MCV 87 88 89   < > 89   * 128*  --   --  186   INR  --   --   --   --  1.06    < > = values in this interval not displayed.     Recent Labs   Lab Test 25  04325  0254   POTASSIUM 4.0 4.1 4.2   CHLORIDE 113* 115* 112*   CO2 25 22 22   BUN 20.8* 31.9* 58.0*   ANIONGAP 5* 6* 6*     Recent Labs   Lab Test 25  0410 25  0254 03/15/25  0635 25  0259 25  1606 25  0658   ALBUMIN 2.8*  --  3.2* 3.2* 2.8*  --  3.0*   BILITOTAL  --   --  0.3 0.9 1.3*  --  2.2*   ALT  --   --  22 75* 96*  --  124*   AST  --   --  18 24 26  --  42   PROTEIN "  --  Negative  --   --   --  70*  --    LIPASE  --   --  36  --  56  --  145*         Imaging results:  CT head 7/1/25:  IMPRESSION:  1.  No CT evidence for acute intracranial process.  2.  Brain atrophy and presumed chronic microvascular ischemic changes as above.    CXR 7/1/25:  IMPRESSION: No acute abnormality.     Procedure results:  EGD 7/1/25 ([facility]):  Findings:       The examined esophagus was normal.        One non-bleeding cratered gastric ulcer with a visible vessel was found        in the gastric antrum. The lesion was 10 mm in largest dimension. Area        was successfully injected with 1.5 mL of a 0.1 mg/mL solution of        epinephrine for hemostasis. Coagulation for hemostasis using bipolar        probe was successful. Biopsies were taken with a cold forceps for        Helicobacter pylori testing.        Few other scattered superficial ulcers present        The examined duodenum was normal.                                                                                    Impression:            - Normal esophagus.                          - Non-bleeding gastric ulcer with a visible vessel.                          Injected. Treated with bipolar cautery. Biopsied.                          - Normal examined duodenum.   Recommendation:        - Await pathology results. (pending)                         - Use Protonix (pantoprazole) 40 mg IV BID for 3 days,                          then daily for 2 months                          - repeat EGD in 2 months.        IMPRESSION:  PUD with GI bleed  This is a 54 y/o male with PMH HTN, adjustment disorder, jaw surgery admitted 7/1 with significant weakness, concern for STEMI with so went emergently to cath lab where no significant CAD was noted, also noted to have facial droop without CVA on CT, noted to have elevated BUN and low hgb of 7.8 with black/red stool. EGD 7/1 with non-bleeding gastric ulcer with visible vessel, injected and cauterized, path pending.  He has been taking NSAIDs as his biggest risk factor, presuming biopsies are negative for H. Pylori, and avoidance of NSAIDs was discussed 7/2. He is on IV PPI and would continue this through today. His stools are now brown and he denies abdominal pain, tolerating diet. Despite improvement he is fearful to go home and have pain. I reassured him that he is already without pain and eating regular food, stable hemoglobin, he will discharge on appropriate meds, etc.     PLAN:  - Regular diet as tolerated  - Await EGD path  - Hemoglobin monitoring per medicine   - Continue IV PPI through today, transition to pantoprazole 40 mg daily until repeat EGD  - Avoid NSAIDs  - Repeat EGD in 2 months ([facility] will call pt to arrange)  - Okay to discharge tomorrow per GI if hgb remains stable    We will no longer actively follow this patient in-house. Please call if questions arise or patient's status changes.       (Dr. Nice)  Mishel Paulino PA-C  [facility]  7/3/2025 10:53 AM  [phone] (office)    21 minutes of total time was spent providing patient care, including patient evaluation, reviewing documentation/test results, , and documentation.  ________________________________________________________________________

## 2025-07-03 NOTE — CARE PLAN
Pt came on the unit and was settled in. Pt denies any pain and wanted a shower. Ordered dinner for Pt and getting him ready for a shower.

## 2025-07-03 NOTE — CONSULTS
Care Management Initial Consult    General Information  Assessment completed with: Patient,    Type of CM/SW Visit: Initial Assessment    Primary Care Provider verified and updated as needed:     Readmission within the last 30 days: no previous admission in last 30 days         Advance Care Planning: Advance Care Planning Reviewed:  (no HCD)          Communication Assessment  Patient's communication style: spoken language (non-English)    Hearing Difficulty or Deaf: no   Wear Glasses or Blind: no    Cognitive  Cognitive/Neuro/Behavioral: WDL  Level of Consciousness: sedated (just completed concious sedation for EGD)  Arousal Level: arouses to repeated stimulation                Living Environment:   People in home: child(jackie), dependent, spouse     Current living Arrangements: apartment      Able to return to prior arrangements: yes       Family/Social Support:  Care provided by: self  Provides care for: child(jackie)  Marital Status:   Support system: Wife, Sibling(s)          Description of Support System: Supportive, Involved         Current Resources:   Patient receiving home care services: No        Community Resources: None  Equipment currently used at home: none  Supplies currently used at home: None    Employment/Financial:  Employment Status: employed full-time        Financial Concerns:             Does the patient's insurance plan have a 3 day qualifying hospital stay waiver?  Would need verification    Lifestyle & Psychosocial Needs:  Social Drivers of Health     Food Insecurity: High Risk (7/1/2025)    Food Insecurity     Within the past 12 months, did you worry that your food would run out before you got money to buy more?: Yes     Within the past 12 months, did the food you bought just not last and you didn t have money to get more?: Yes   Depression: Not on file   Housing Stability: High Risk (7/1/2025)    Housing Stability     Do you have housing? : Yes     Are you worried about losing your  housing?: Yes   Tobacco Use: Low Risk  (7/1/2025)    Patient History     Smoking Tobacco Use: Never     Smokeless Tobacco Use: Never     Passive Exposure: Not on file   Financial Resource Strain: Low Risk  (7/1/2025)    Financial Resource Strain     Within the past 12 months, have you or your family members you live with been unable to get utilities (heat, electricity) when it was really needed?: No   Alcohol Use: Not on file   Transportation Needs: Low Risk  (7/1/2025)    Transportation Needs     Within the past 12 months, has lack of transportation kept you from medical appointments, getting your medicines, non-medical meetings or appointments, work, or from getting things that you need?: No   Physical Activity: Not on file   Interpersonal Safety: Low Risk  (7/1/2025)    Interpersonal Safety     Do you feel physically and emotionally safe where you currently live?: Yes     Within the past 12 months, have you been hit, slapped, kicked or otherwise physically hurt by someone?: No     Within the past 12 months, have you been humiliated or emotionally abused in other ways by your partner or ex-partner?: No   Stress: Not on file   Social Connections: Not on file   Health Literacy: Not on file       Functional Status:  Prior to admission patient needed assistance:   Dependent ADLs:: Independent  Dependent IADLs:: Independent       Mental Health Status:          Chemical Dependency Status:                Values/Beliefs:  Spiritual, Cultural Beliefs, Latter day Practices, Values that affect care:                 Discussed  Partnership in Safe Discharge Planning  document with patient/family: No    Additional Information:    Assessment completed with patient with assistance from  [id number]). Patient reports he lives in his apartment with spouse and two children. He is independent with ADLS/IADLs, ambulates without devices and has no services in community.  Spouse is primary family contact.    Patient works  full-time. He is requesting letter from MD for work.  He has concerns regarding lifting and dizziness, His job requires heavy lifting.  Hospitalist notified.    Planning for return home.         Next Steps:     Follow for progression and recommendations.        Tamia Watts RN

## 2025-07-04 VITALS
TEMPERATURE: 98.2 F | RESPIRATION RATE: 16 BRPM | BODY MASS INDEX: 24.51 KG/M2 | DIASTOLIC BLOOD PRESSURE: 89 MMHG | HEIGHT: 62 IN | HEART RATE: 104 BPM | OXYGEN SATURATION: 98 % | SYSTOLIC BLOOD PRESSURE: 149 MMHG | WEIGHT: 133.2 LBS

## 2025-07-04 LAB
GLUCOSE BLDC GLUCOMTR-MCNC: 97 MG/DL (ref 70–99)
HGB BLD-MCNC: 10.5 G/DL (ref 13.3–17.7)
MCV RBC AUTO: 87 FL (ref 78–100)

## 2025-07-04 PROCEDURE — 250N000011 HC RX IP 250 OP 636: Performed by: HOSPITALIST

## 2025-07-04 PROCEDURE — 99239 HOSP IP/OBS DSCHRG MGMT >30: CPT | Performed by: HOSPITALIST

## 2025-07-04 PROCEDURE — 85018 HEMOGLOBIN: CPT | Performed by: HOSPITALIST

## 2025-07-04 PROCEDURE — 36415 COLL VENOUS BLD VENIPUNCTURE: CPT | Performed by: HOSPITALIST

## 2025-07-04 PROCEDURE — 250N000013 HC RX MED GY IP 250 OP 250 PS 637: Performed by: INTERNAL MEDICINE

## 2025-07-04 PROCEDURE — 250N000013 HC RX MED GY IP 250 OP 250 PS 637: Performed by: HOSPITALIST

## 2025-07-04 RX ORDER — PANTOPRAZOLE SODIUM 40 MG/1
40 TABLET, DELAYED RELEASE ORAL DAILY
Qty: 90 TABLET | Refills: 0 | Status: SHIPPED | OUTPATIENT
Start: 2025-07-04

## 2025-07-04 RX ORDER — METOPROLOL SUCCINATE 25 MG/1
25 TABLET, EXTENDED RELEASE ORAL DAILY
Qty: 30 TABLET | Refills: 0 | Status: SHIPPED | OUTPATIENT
Start: 2025-07-04

## 2025-07-04 RX ORDER — AMLODIPINE BESYLATE 10 MG/1
10 TABLET ORAL DAILY
Qty: 30 TABLET | Refills: 0 | Status: SHIPPED | OUTPATIENT
Start: 2025-07-04

## 2025-07-04 RX ORDER — HYDRALAZINE HYDROCHLORIDE 20 MG/ML
10 INJECTION INTRAMUSCULAR; INTRAVENOUS EVERY 6 HOURS PRN
Status: DISCONTINUED | OUTPATIENT
Start: 2025-07-04 | End: 2025-07-04 | Stop reason: HOSPADM

## 2025-07-04 RX ORDER — ATORVASTATIN CALCIUM 40 MG/1
40 TABLET, FILM COATED ORAL DAILY
Qty: 90 TABLET | Refills: 3 | Status: SHIPPED | OUTPATIENT
Start: 2025-07-04

## 2025-07-04 RX ORDER — LOSARTAN POTASSIUM 25 MG/1
25 TABLET ORAL DAILY
Qty: 30 TABLET | Refills: 0 | Status: SHIPPED | OUTPATIENT
Start: 2025-07-04

## 2025-07-04 RX ADMIN — ATORVASTATIN CALCIUM 40 MG: 40 TABLET, FILM COATED ORAL at 09:34

## 2025-07-04 RX ADMIN — PANTOPRAZOLE SODIUM 40 MG: 40 INJECTION, POWDER, FOR SOLUTION INTRAVENOUS at 09:34

## 2025-07-04 RX ADMIN — METOPROLOL SUCCINATE 25 MG: 25 TABLET, EXTENDED RELEASE ORAL at 09:35

## 2025-07-04 RX ADMIN — HYDRALAZINE HYDROCHLORIDE 10 MG: 20 INJECTION INTRAMUSCULAR; INTRAVENOUS at 00:57

## 2025-07-04 RX ADMIN — LOSARTAN POTASSIUM 25 MG: 25 TABLET, FILM COATED ORAL at 09:35

## 2025-07-04 ASSESSMENT — ACTIVITIES OF DAILY LIVING (ADL)
ADLS_ACUITY_SCORE: 34

## 2025-07-04 NOTE — PLAN OF CARE
Problem: Adult Inpatient Plan of Care  Goal: Readiness for Transition of Care  Outcome: Progressing     Problem: Acute Coronary Syndrome  Goal: Normalized Cardiac Rhythm  Outcome: Progressing     Problem: Gastrointestinal Bleeding  Goal: Hemostasis  Outcome: Progressing   Goal Outcome Evaluation:    Patient AAO. Denies pain. Hgb stable this morning. No s/sx of bleeding. Will discharge later this morning.

## 2025-07-04 NOTE — DISCHARGE SUMMARY
[facility] MEDICINE  DISCHARGE SUMMARY     [facility]  Admission Date: 7/1/2025   Discharge Provider: Beata Hernandez MD Discharge Date: 7/4/2025      Code Status: Full Code     Condition at Discharge: Good     REASON FOR PRESENTATION(See Admission Note for Details)   Weakness, fall    PRINCIPAL & ACTIVE DISCHARGE DIAGNOSES     Principal Problem:    Gastrointestinal hemorrhage associated with gastric ulcer  Active Problems:    ACS (acute coronary syndrome) (H)    Essential (primary) hypertension      PENDING LABS     Unresulted Labs Ordered in the Past 30 Days of this Admission       Date and Time Order Name Status Description    7/1/2025  3:35 PM Prepare red blood cells (unit) Preliminary     7/1/2025  9:18 AM Prepare red blood cells (unit) Preliminary     7/1/2025  9:18 AM Prepare red blood cells (unit) Preliminary             PROCEDURES ( this hospitalization only)      Procedure(s):  ESOPHAGOGASTRODUODENOSCOPY WITH BIOPSIES, EPINEPHRINE INJECTION AND BIPOLAR CAUTERY    RECOMMENDATIONS TO OUTPATIENT PROVIDER FOR F/U VISIT     Follow-up Appointments       Follow Up      Echocardiogram in 1 month. Call [facility] at [phone] to schedule if you do not hear from them.  Follow up with cardiology sometime after the echo.  Follow up with GI for another endoscopy in 6-8 weeks. Call [facility] at [phone] to schedule if you do not hear from them.        Hospital Follow-up with Existing Primary Care Provider (PCP)          Schedule Primary Care visit within: 7 Days   Recommended labs and Imaging (to be ordered by Primary Care Provider): Hgb in 1 week; lipid profile in 1-3 months               DISPOSITION     Home    SUMMARY OF HOSPITAL COURSE:      Blue Davey is a 55 year old male with history of hypertension, mood disorder, right lower jaw surgery in 1985 resulting in chronic facial droop here for weakness and a fall found to  have bleeding peptic ulcer. Hospital Day: 4        # Weakness  # Fall  -Likely orthostasis or vasovagal related to acute blood loss anemia  -There was concern for possible anginal equivalent, patient had urgent coronary angiogram did not show any severe stenosis  -Now walking without symptoms     # Acute blood loss anemia  #melena  # Peptic ulcer  - Noted 1 episode of melena prior to arrival, had ongoing melena here, since resolved  - Hemoglobin gurinder 8.0, down from 12.1 back in March  -Has received 2 units PRBC during hospital stay.  Today hemoglobin 10.5  -GI took him for EGD found cratered peptic ulcer with exposed vessel, no longer actively bleeding.  Lesion was injected and cauterized.  Biopsies were taken.  Multiple other superficial ulcers were also noted.  - GI recommended p.o. PPI daily for 2 months  -He will need a follow-up EGD in 2 months     #Nonobstructive coronary artery disease  -Cardiology took him for urgent coronary angiogram showed mild nonobstructive disease  - Cardiology started him on atorvastatin     # Nonischemic cardiomyopathy  - Echocardiogram showed mildly reduced LVEF 50 to 55% with abnormal strain  -Cardiology favor stress-induced  -Needs a follow-up echo in 4 to 6 weeks     #Essential hypertension  - Patient has not been taking his home BP meds in over a month  - decreased metoprolol dose due to mild bradycardia  -sent 30 day supply of his losartan and amlodipine       Clinically Significant Risk Factors          # Hyperchloremia: Highest Cl = 113 mmol/L in last 2 days, will monitor as appropriate          # Hypoalbuminemia: Lowest albumin = 2.8 g/dL at 7/2/2025  4:10 AM, will monitor as appropriate     # Hypertension: Noted on problem list                          Discharge Medications with Med changes:     Current Discharge Medication List        START taking these medications    Details   atorvastatin (LIPITOR) 40 MG tablet Take 1 tablet (40 mg) by mouth daily.  Qty: 90 tablet,  Refills: 3    Associated Diagnoses: POLLY (acute kidney injury); Chest pain, unspecified type; Acute cholecystitis      pantoprazole (PROTONIX) 40 MG EC tablet Take 1 tablet (40 mg) by mouth daily.  Qty: 90 tablet, Refills: 0    Associated Diagnoses: Gastrointestinal hemorrhage associated with gastric ulcer           CONTINUE these medications which have CHANGED    Details   amLODIPine (NORVASC) 10 MG tablet Take 1 tablet (10 mg) by mouth daily.  Qty: 30 tablet, Refills: 0    Associated Diagnoses: POLLY (acute kidney injury); Chest pain, unspecified type; Acute cholecystitis; Generalized abdominal pain      losartan (COZAAR) 25 MG tablet Take 1 tablet (25 mg) by mouth daily.  Qty: 30 tablet, Refills: 0    Associated Diagnoses: POLLY (acute kidney injury); Chest pain, unspecified type; Acute cholecystitis; Generalized abdominal pain      metoprolol succinate ER (TOPROL XL) 25 MG 24 hr tablet Take 1 tablet (25 mg) by mouth daily.  Qty: 30 tablet, Refills: 0    Associated Diagnoses: POLLY (acute kidney injury); Chest pain, unspecified type; Acute cholecystitis; Generalized abdominal pain           STOP taking these medications       omeprazole (PRILOSEC OTC) 20 MG EC tablet Comments:   Reason for Stopping:                 Consults     PHARMACY IP CONSULT  HOSPITALIST IP CONSULT  INTENSIVIST IP CONSULT  GASTROENTEROLOGY IP CONSULT  CARE MANAGEMENT / SOCIAL WORK IP CONSULT  SMOKING CESSATION PROGRAM IP CONSULT        SIGNIFICANT IMAGING FINDINGS     Results for orders placed or performed during the hospital encounter of 07/01/25   Head CT w/o contrast    Impression    IMPRESSION:  1.  No CT evidence for acute intracranial process.  2.  Brain atrophy and presumed chronic microvascular ischemic changes as above.     XR Chest Port 1 View    Impression    IMPRESSION: No acute abnormality.   Echocardiogram Complete   Result Value Ref Range    LVEF  50-55%        SIGNIFICANT LABORATORY FINDINGS     See emr    Discharge Orders         Follow-Up with Cardiology      Reason for your hospital stay    GI bleed     Activity    Your activity upon discharge: activity as tolerated     When to contact your care team    Call your primary doctor if you have any of the following: chest pain, shortness of breath, fever, chills, fainting, dizziness, vomiting, constipation, dehydration, worsening pain, bleeding, skin or eyes turning yellow, or trouble urinating, or any other symptoms that are new or concerning to you.     Follow Up    Echocardiogram in 1 month. Call [facility] at [phone] to schedule if you do not hear from them.  Follow up with cardiology sometime after the echo.  Follow up with GI for another endoscopy in 6-8 weeks. Call [facility] at [phone] to schedule if you do not hear from them.     Diet    Follow this diet upon discharge: resume your regular diet. Try to eat lots of lean meats and green leafy vegetables. Avoid spicy, acidic, or crunchy foods for the next 1-2 weeks.     Hospital Follow-up with Existing Primary Care Provider (PCP)            Examination   Physical Exam   Temp:  [98  F (36.7  C)-98.2  F (36.8  C)] 98.2  F (36.8  C)  Pulse:  [] 104  Resp:  [16-49] 16  BP: (137-184)/(59-93) 149/89  SpO2:  [96 %-98 %] 98 %  Wt Readings from Last 1 Encounters:   07/03/25 60.4 kg (133 lb 3.2 oz)       General: in no apparent distress, non-toxic, and alert male sitting on side of bed oriented x3  HEENT: Head normocephalic atraumatic, oral mucosa moist. Sclerae anicteric. He is missing teeth on right lower jaw.  Skin: No rashes or lesions  Extremities: No peripheral edema  Psych: Normal affect, mood euthymic  Neuro: talks out of the left side of his mouth    Please see EMR for more detailed significant labs, imaging, consultant notes etc.    IBeata MD, personally saw the patient today and spent greater than 30 minutes discharging this patient.    Baeta Hernandez MD  [facility]  Hospital    [facility]     No

## 2025-07-04 NOTE — PROGRESS NOTES
Care Management Discharge Note    Discharge Date: 07/04/2025       Discharge Disposition: Home    Discharge Services: None    Discharge DME: None    Discharge Transportation: family or friend will provide    Private pay costs discussed: Not applicable    Does the patient's insurance plan have a 3 day qualifying hospital stay waiver?  No    PAS Confirmation Code: N/A  Patient/family educated on Medicare website which has current facility and service quality ratings: no    Education Provided on the Discharge Plan: No  Persons Notified of Discharge Plans:   Patient/Family in Agreement with the Plan: unable to assess    Handoff Referral Completed: No, handoff not indicated or clinically appropriate    Additional Information:  Plan is to discharge home with family today. Pt is independent. Family to transport.     No CM needs requested or recommended for discharge planning. CM will sign off. Please contact CM if any additional needs arise prior to discharge.      ROSEMARY Fuller

## 2025-07-04 NOTE — PLAN OF CARE
Problem: Gastrointestinal Bleeding  Goal: Hemostasis  7/3/2025 2300 by Cleo Coyne, RN  Outcome: Progressing  7/3/2025 2257 by Cleo Coyne RN  Outcome: Progressing  Intervention: Manage Gastrointestinal Bleeding  Recent Flowsheet Documentation  Taken 7/3/2025 2000 by Cleo Coyne, RN  Environmental Support: calm environment promoted   Goal Outcome Evaluation:       Problem: Acute Coronary Syndrome  Goal: Optimal Adaptation to Illness  7/3/2025 2300 by Cleo Coyne RN  Outcome: Progressing     Problem: Comorbidity Management  Goal: Blood Pressure in Desired Range  Outcome: Progressing     Pt is A & O x4.   Denies pain  Lines    Respirations even and unlabored.   No distress noted on assessments.  Saturation WNL on RA  Denies SOB/ difficult of breathing/ chest pain/abdominal pain  No Bleeding noted  Independent in Room.   Care explained.   Call light within reach   Plan of care on going

## 2025-07-04 NOTE — PROGRESS NOTES
Discharge paperwork reviewed with patient. Medications from  pharmacy signed for and sent with patient. All personally belongings taken with from room. Family to transport patient home.

## 2025-07-07 LAB
PATH REPORT.ADDENDUM SPEC: NORMAL
PATH REPORT.COMMENTS IMP SPEC: NORMAL
PATH REPORT.FINAL DX SPEC: NORMAL
PATH REPORT.GROSS SPEC: NORMAL
PATH REPORT.MICROSCOPIC SPEC OTHER STN: NORMAL
PATH REPORT.RELEVANT HX SPEC: NORMAL
PHOTO IMAGE: NORMAL

## 2025-07-09 ENCOUNTER — PATIENT OUTREACH (OUTPATIENT)
Dept: CARE COORDINATION | Facility: CLINIC | Age: 55
End: 2025-07-09
Payer: COMMERCIAL

## 2025-07-09 NOTE — PROGRESS NOTES
Connected Care Resource Center Contact  [facility]/Voicemail     Clinical Data: Post-Discharge Outreach     Outreach attempted x 2.  Left message on patient's voicemail, providing [facility]'s central phone number of [phone] ([phone]) for questions/concerns and/or to schedule an appt with an [facility] provider, if they do not have a PCP.      Plan:  [facility] will do no further outreaches at this time.        Patti COY, Community Health Worker  Clinic Care Coordination  [facility]  Phone: [phone]      *Connected Care Resource Team does NOT follow patient ongoing. Referrals are identified based on internal discharge reports and the outreach is to ensure patient has an understanding of their discharge instructions

## (undated) DEVICE — SHTH INTRO 0.021IN ID 6FR DIA

## (undated) DEVICE — KIT HAND CONTROL ACIST 014644 AR-P54

## (undated) DEVICE — Device

## (undated) DEVICE — GUIDEWIRE FORTE FLOPPY J TOP 34949-05J

## (undated) DEVICE — CONNECTOR ONE-LINK INJECTION SITE LF 7N8399

## (undated) DEVICE — ENDO SHEARS RENEW LAP ENDOCUT SCISSOR TIP 16.5MM 3142

## (undated) DEVICE — SLEEVE TR BAND RADIAL COMPRESSION DEVICE 24CM TRB24-REG

## (undated) DEVICE — MANIFOLD KIT ANGIO AUTOMATED 014613

## (undated) DEVICE — CATH SUCTION 14FR W/O CTRL DYND41962

## (undated) DEVICE — SOL WATER IRRIG 500ML BOTTLE 2F7113

## (undated) DEVICE — KIT CONNECTOR FOR OLYMPUS ENDOSCOPES DEFENDO 100310

## (undated) DEVICE — TUBING IV EXTENSION SET ANESTHESIA 34" MLL 2C6227

## (undated) DEVICE — DRAPE C-ARM 60X42" 1013

## (undated) DEVICE — ENDO TROCAR FIRST ENTRY KII FIOS Z-THRD 05X100MM CTF03

## (undated) DEVICE — CATH DIAGNOSTIC RADIAL 5FR TIG 4.0

## (undated) DEVICE — TUBING SUCTION MEDI-VAC 1/4"X20' N620A

## (undated) DEVICE — ENDO TROCAR FIRST ENTRY KII FIOS Z-THRD 11X100MM CTF33

## (undated) DEVICE — SUCTION MANIFOLD NEPTUNE 2 SYS 1 PORT 702-025-000

## (undated) DEVICE — CUSTOM PACK CORONARY SAN5BCRHEA

## (undated) DEVICE — CLIP APPLIER ENDO ROTATING 10MM MED/LG ER320

## (undated) DEVICE — KIT IV START W/O CATH

## (undated) DEVICE — CATH CHOLANGIOGRAM 4.5FR TAUT METAL TIP 20018-M55

## (undated) DEVICE — ESU GROUND PAD ADULT REM W/15' CORD E7507DB

## (undated) DEVICE — EXCHANGE WIRE .035 260 STAR/JFC/035/260/ M001491681

## (undated) DEVICE — GOWN LG DISP 9515

## (undated) DEVICE — CATH DIAG 4FR ANG PIG 538453S

## (undated) DEVICE — KIT ENDO TURNOVER/PROCEDURE CARRY-ON 4004277

## (undated) DEVICE — SOL RINGERS LACTATED 1000ML BAG 2B2324X

## (undated) DEVICE — TUBING ENDOGATOR + H2O PORT CO

## (undated) DEVICE — NDL INSUFFLATION 13GA 120MM C2201

## (undated) DEVICE — VIAL DECANTER STERILE WHITE DYNJDEC06

## (undated) DEVICE — SUCTION CANISTER 1000ML 65651-510

## (undated) DEVICE — ELECTRODE DEFIB CADENCE 22550R

## (undated) DEVICE — SU MONOCRYL+ 4-0 18IN PS2 UND MCP496G

## (undated) DEVICE — GLOVE PI ULTRATCH M LF SZ 6.5 PF CUFF TEXT STRL LF 42665

## (undated) DEVICE — VALVE HEMOSTASIS .096" COPILOT MECH 1003331

## (undated) DEVICE — PROBE BIPOLAR HEMOSTASIS GOLD 07FRX210CM M00560150

## (undated) DEVICE — FORCEP BIOPSY 2.3MM DISP COATED 000388

## (undated) DEVICE — SUCTION STRYKERFLOW II 250-070-500

## (undated) DEVICE — BITE BLOCK SCOPE DISP 20 X 27 000429

## (undated) DEVICE — CATH IV 14GA 2IN REM FLASHPLUG DEHP-FR PVC FR 4251717-02

## (undated) DEVICE — SUTURE PASSOR W/GUIDE RSG-14F-4-WG

## (undated) DEVICE — CUSTOM PACK LAP CHOLE SBA5BLCHEA

## (undated) DEVICE — SWABCAP DISINFECTANT GREEN CFF10-250

## (undated) DEVICE — SOL WATER IRRIG 1000ML BOTTLE 2F7114

## (undated) DEVICE — PREP CHLORAPREP 26ML TINTED HI-LITE ORANGE 930815

## (undated) DEVICE — SYR ANGIOGRAPHY MULTIUSE KIT ACIST 014612

## (undated) DEVICE — SYR 03ML LL W/O NDL 309657

## (undated) DEVICE — DEVICE INFLATION SYR W/ HEMOSTASIS VALVE 12IN EXT IN4904

## (undated) DEVICE — SYR 30ML LL W/O NDL 302832

## (undated) DEVICE — ENDO TROCAR SLEEVE KII Z-THREADED 05X100MM CTS02

## (undated) DEVICE — GLOVE BIOGEL PI ULTRATOUCH G SZ 7.5 42175

## (undated) DEVICE — TUBING SMOKE EVAC PNEUMOCLEAR HIGH FLOW 0620050250

## (undated) RX ORDER — ONDANSETRON 2 MG/ML
INJECTION INTRAMUSCULAR; INTRAVENOUS
Status: DISPENSED
Start: 2025-03-11

## (undated) RX ORDER — FENTANYL CITRATE 50 UG/ML
INJECTION, SOLUTION INTRAMUSCULAR; INTRAVENOUS
Status: DISPENSED
Start: 2025-07-01

## (undated) RX ORDER — KETOROLAC TROMETHAMINE 30 MG/ML
INJECTION, SOLUTION INTRAMUSCULAR; INTRAVENOUS
Status: DISPENSED
Start: 2025-03-11

## (undated) RX ORDER — LIDOCAINE HYDROCHLORIDE 10 MG/ML
INJECTION, SOLUTION EPIDURAL; INFILTRATION; INTRACAUDAL; PERINEURAL
Status: DISPENSED
Start: 2025-03-11

## (undated) RX ORDER — BUPIVACAINE HYDROCHLORIDE 2.5 MG/ML
INJECTION, SOLUTION EPIDURAL; INFILTRATION; INTRACAUDAL; PERINEURAL
Status: DISPENSED
Start: 2025-03-11

## (undated) RX ORDER — FENTANYL CITRATE 50 UG/ML
INJECTION, SOLUTION INTRAMUSCULAR; INTRAVENOUS
Status: DISPENSED
Start: 2025-03-11

## (undated) RX ORDER — PROPOFOL 10 MG/ML
INJECTION, EMULSION INTRAVENOUS
Status: DISPENSED
Start: 2025-03-11

## (undated) RX ORDER — DEXAMETHASONE SODIUM PHOSPHATE 10 MG/ML
INJECTION, SOLUTION INTRAMUSCULAR; INTRAVENOUS
Status: DISPENSED
Start: 2025-03-11